# Patient Record
Sex: FEMALE | Race: BLACK OR AFRICAN AMERICAN | Employment: OTHER | URBAN - METROPOLITAN AREA
[De-identification: names, ages, dates, MRNs, and addresses within clinical notes are randomized per-mention and may not be internally consistent; named-entity substitution may affect disease eponyms.]

---

## 2017-06-26 ENCOUNTER — APPOINTMENT (OUTPATIENT)
Dept: CT IMAGING | Age: 82
End: 2017-06-26
Attending: EMERGENCY MEDICINE
Payer: MEDICARE

## 2017-06-26 ENCOUNTER — HOSPITAL ENCOUNTER (EMERGENCY)
Age: 82
Discharge: HOME | End: 2017-06-26
Attending: EMERGENCY MEDICINE
Payer: MEDICARE

## 2017-06-26 VITALS
HEIGHT: 65 IN | OXYGEN SATURATION: 99 % | WEIGHT: 154 LBS | RESPIRATION RATE: 18 BRPM | SYSTOLIC BLOOD PRESSURE: 165 MMHG | DIASTOLIC BLOOD PRESSURE: 70 MMHG | TEMPERATURE: 97.5 F | BODY MASS INDEX: 25.66 KG/M2 | HEART RATE: 75 BPM

## 2017-06-26 DIAGNOSIS — R29.6 RECURRENT FALLS: Primary | ICD-10-CM

## 2017-06-26 PROCEDURE — 70450 CT HEAD/BRAIN W/O DYE: CPT

## 2017-06-26 PROCEDURE — 93005 ELECTROCARDIOGRAM TRACING: CPT

## 2017-06-26 PROCEDURE — 99284 EMERGENCY DEPT VISIT MOD MDM: CPT

## 2017-06-26 NOTE — ED TRIAGE NOTES
Reports intermittent \"dizziness\" that she describes as her \"head swimming\" x2 years. Has discussed with her PCP and told she has anemia but does not take anything for it. Also reporting a headache since Thursday that she did NOT take anything for PTA.

## 2017-06-26 NOTE — ED NOTES
While doing EKG, patient reports \"last weekend I was sitting with my friends and I knew what I wanted to say but my speech was garbled. I couldn't get the words out. \" Dr. Sloan Fears aware when EKG was given to provider. Patient states symptoms lasted approximately 20 minutes.

## 2017-06-26 NOTE — DISCHARGE INSTRUCTIONS
Preventing Falls: Care Instructions  Your Care Instructions  Getting around your home safely can be a challenge if you have injuries or health problems that make it easy for you to fall. Loose rugs and furniture in walkways are among the dangers for many older people who have problems walking or who have poor eyesight. People who have conditions such as arthritis, osteoporosis, or dementia also have to be careful not to fall. You can make your home safer with a few simple measures. Follow-up care is a key part of your treatment and safety. Be sure to make and go to all appointments, and call your doctor if you are having problems. It's also a good idea to know your test results and keep a list of the medicines you take. How can you care for yourself at home? Taking care of yourself  · You may get dizzy if you do not drink enough water. To prevent dehydration, drink plenty of fluids, enough so that your urine is light yellow or clear like water. Choose water and other caffeine-free clear liquids. If you have kidney, heart, or liver disease and have to limit fluids, talk with your doctor before you increase the amount of fluids you drink. · Exercise regularly to improve your strength, muscle tone, and balance. Walk if you can. Swimming may be a good choice if you cannot walk easily. · Have your vision and hearing checked each year or any time you notice a change. If you have trouble seeing and hearing, you might not be able to avoid objects and could lose your balance. · Know the side effects of the medicines you take. Ask your doctor or pharmacist whether the medicines you take can affect your balance. Sleeping pills or sedatives can affect your balance. · Limit the amount of alcohol you drink. Alcohol can impair your balance and other senses. · Ask your doctor whether calluses or corns on your feet need to be removed.  If you wear loose-fitting shoes because of calluses or corns, you can lose your balance and fall. · Talk to your doctor if you have numbness in your feet. Preventing falls at home  · Remove raised doorway thresholds, throw rugs, and clutter. Repair loose carpet or raised areas in the floor. · Move furniture and electrical cords to keep them out of walking paths. · Use nonskid floor wax, and wipe up spills right away, especially on ceramic tile floors. · If you use a walker or cane, put rubber tips on it. If you use crutches, clean the bottoms of them regularly with an abrasive pad, such as steel wool. · Keep your house well lit, especially [place], and outside walkways. Use night-lights in areas such as hallways and bathrooms. Add extra light switches or use remote switches (such as switches that go on or off when you clap your hands) to make it easier to turn lights on if you have to get up during the night. · Install sturdy handrails on stairways. · Move items in your cabinets so that the things you use a lot are on the lower shelves (about waist level). · Keep a cordless phone and a flashlight with new batteries by your bed. If possible, put a phone in each of the main rooms of your house, or carry a cell phone in case you fall and cannot reach a phone. Or, you can wear a device around your neck or wrist. You push a button that sends a signal for help. · Wear low-heeled shoes that fit well and give your feet good support. Use footwear with nonskid soles. Check the heels and soles of your shoes for wear. Repair or replace worn heels or soles. · Do not wear socks without shoes on wood floors. · Walk on the grass when the sidewalks are slippery. If you live in an area that gets snow and ice in the winter, sprinkle salt on slippery steps and sidewalks. Preventing falls in the bath  · Install grab bars and nonskid mats inside and outside your shower or tub and near the toilet and sinks. · Use shower chairs and bath benches.   · Use a hand-held shower head that will allow you to sit while showering. · Get into a tub or shower by putting the weaker leg in first. Get out of a tub or shower with your strong side first.  · Repair loose toilet seats and consider installing a raised toilet seat to make getting on and off the toilet easier. · Keep your bathroom door unlocked while you are in the shower. Where can you learn more? Go to http://nelly-mellissa.info/. Enter [id number] [id number] in the search box to learn more about \"Preventing Falls: Care Instructions. \"  Current as of: August 4, 2016  Content Version: 11.3  © 3844-8252 [employer]. Care instructions adapted under license by [employer] (which disclaims liability or warranty for this information). If you have questions about a medical condition or this instruction, always ask your healthcare professional. [place] [address] any warranty or liability for your use of this information. We hope that we have addressed all of your medical concerns. The examination and treatment you received in the Emergency Department were for an emergent problem and were not intended as complete care. It is important that you follow up with your healthcare provider(s) for ongoing care. If your symptoms worsen or do not improve as expected, and you are unable to reach your usual health care provider(s), you should return to the Emergency Department. Today's healthcare is undergoing tremendous change, and patient satisfaction surveys are one of the many tools to assess the quality of medical care. You may receive a survey from the [employer] organization regarding your experience in the Emergency Department. I hope that your experience has been completely positive, particularly the medical care that I provided. As such, please participate in the survey; anything less than excellent does not meet my expectations or intentions.         [facility] [employer] participate in nationally recognized quality of care measures. If your blood pressure is greater than 120/80, as reported below, we urge that you seek medical care to address the potential of high blood pressure, commonly known as hypertension. Hypertension can be hereditary or can be caused by certain medical conditions, pain, stress, or \"white coat syndrome. \"       Please make an appointment with your health care provider(s) for follow up of your Emergency Department visit. VITALS:   Patient Vitals for the past 8 hrs:   Temp Pulse Resp BP SpO2   06/26/17 1800 - - - - 97 %   06/26/17 1745 - - - - 100 %   06/26/17 1715 - - - 159/56 99 %   06/26/17 1700 - - - 199/70 -   06/26/17 1404 97.5 °F (36.4 °C) 75 18 (!) 207/92 100 %          Thank you for allowing us to provide you with medical care today. We realize that you have many choices for your emergency care needs. Please choose us in the future for any continued health care needs. Bunny Berry, [address] [place] [address] Avenue: [phone]            No results found for this or any previous visit (from the past 24 hour(s)). Ct Head Wo Cont    Result Date: 6/26/2017  EXAM:  CT HEAD WO CONT Clinical history: Dizziness and falls INDICATION:   dizziness, and falls COMPARISON: None. TECHNIQUE: Unenhanced CT of the head was performed using 5 mm images. Brain and bone windows were generated. CT dose reduction was achieved through use of a standardized protocol tailored for this examination and automatic exposure control for dose modulation. FINDINGS: Sulcal and ventricular prominence. . Scattered hypodensities. . There is no intracranial hemorrhage, extra-axial collection, mass, mass effect or midline shift. The basilar cisterns are open. No acute infarct is identified. The bone windows demonstrate no abnormalities. The visualized portions of the paranasal sinuses and mastoid air cells are clear. IMPRESSION: No acute intracranial process.

## 2017-06-26 NOTE — ED PROVIDER NOTES
HPI Comments: 80 y.o. female with past medical history significant for DM and CKD who presents to the ED with chief complaint of dizziness. Pt reports intermittent dizziness for the past couple years accompanied by a headache onset today that \"feels like her head is going to pop off. \" Pt states on 2/6/17 she had an episode where she found herself on the floor and was unsure how she got there and was unable to get up. Pt states she was admitted for about 2-3 weeks at that time and had a negative work up, says she was discharged to a rehab facility but has since gone home. Pt states she had another similar episode after returning home when she stood up and \"went down to the floor,\" says her home health nurse took her BP at that time and told her she was hypotensive. Pt states she has hx of DM and takes glipizide. There are no other acute medical complaints voiced at this time. PCP: Moy Michelle MD    Note written by Chandni Betancourt, as dictated by Lb Marley MD 2:44 PM     The history is provided by the patient. No past medical history on file. No past surgical history on file. No family history on file. Social History     Social History    Marital status: SINGLE     Spouse name: N/A    Number of children: N/A    Years of education: N/A     Occupational History    Not on file. Social History Main Topics    Smoking status: Not on file    Smokeless tobacco: Not on file    Alcohol use Not on file    Drug use: Not on file    Sexual activity: Not on file     Other Topics Concern    Not on file     Social History Narrative    No narrative on file         ALLERGIES: Review of patient's allergies indicates no known allergies. Review of Systems   Constitutional: Negative for chills and fever. HENT: Negative for ear pain and sore throat. Eyes: Negative for photophobia and pain. Respiratory: Negative for chest tightness and shortness of breath. Cardiovascular: Negative for chest pain and leg swelling. Gastrointestinal: Negative for abdominal pain, nausea and vomiting. Genitourinary: Negative for dysuria and flank pain. Musculoskeletal: Negative for back pain and neck pain. Skin: Negative for rash and wound. Neurological: Positive for dizziness and headaches. Negative for light-headedness. All other systems reviewed and are negative. Vitals:    06/26/17 1404   BP: (!) 207/92   Pulse: 75   Resp: 18   Temp: 97.5 °F (36.4 °C)   SpO2: 100%   Weight: 69.9 kg (154 lb)   Height: 5' 5\" (1.651 m)            Physical Exam   Constitutional: She is oriented to person, place, and time. She appears well-developed and well-nourished. No distress. HENT:   Head: Normocephalic and atraumatic. Mouth/Throat: Oropharynx is clear and moist.   Eyes: Conjunctivae and EOM are normal. Pupils are equal, round, and reactive to light. Neck: Normal range of motion. Cardiovascular: Normal rate, regular rhythm, normal heart sounds and intact distal pulses. No murmur heard. Pulmonary/Chest: Effort normal and breath sounds normal. No stridor. No respiratory distress. She has no wheezes. Abdominal: Soft. Bowel sounds are normal. There is no tenderness. Musculoskeletal: Normal range of motion. She exhibits no edema, tenderness or deformity. Neurological: She is alert and oriented to person, place, and time. No cranial nerve deficit. Skin: Skin is warm and dry. She is not diaphoretic. Psychiatric: She has a normal mood and affect. Nursing note and vitals reviewed. MDM  Number of Diagnoses or Management Options  Recurrent falls:   Diagnosis management comments: Patient with headache and reports that she occasionally ends up on floor for no reason. Denies passing out, just feels like her legs quit. Check head CT for headache and HTN  Patient not orthostatic and safe for d/c home to f/u with her doctor.        Amount and/or Complexity of Data Reviewed  Tests in the radiology section of CPT®: ordered and reviewed    Patient Progress  Patient progress: improved    ED Course       Procedures    ED EKG interpretation:  Rhythm: normal sinus rhythm; and regular . Rate (approx.): 76; Axis: normal; ST/T wave: Non STEMI; Nonspecific ST and T wave abnormality. Note written by Chandni Tierney, as dictated by Tiny Burgos MD 2:15 PM    PROGRESS NOTE:  5:14 PM   Pt is not orthostatic. Still awaiting head CT. No results found for this or any previous visit (from the past 24 hour(s)). Ct Head Wo Cont    Result Date: 6/26/2017  EXAM:  CT HEAD WO CONT Clinical history: Dizziness and falls INDICATION:   dizziness, and falls COMPARISON: None. TECHNIQUE: Unenhanced CT of the head was performed using 5 mm images. Brain and bone windows were generated. CT dose reduction was achieved through use of a standardized protocol tailored for this examination and automatic exposure control for dose modulation. FINDINGS: Sulcal and ventricular prominence. . Scattered hypodensities. . There is no intracranial hemorrhage, extra-axial collection, mass, mass effect or midline shift. The basilar cisterns are open. No acute infarct is identified. The bone windows demonstrate no abnormalities. The visualized portions of the paranasal sinuses and mastoid air cells are clear. IMPRESSION: No acute intracranial process.

## 2017-06-26 NOTE — ED NOTES
Patient was stuck 5 times by 2 nurses including with the ultrasound and no blood was obtained and no IV access was established. MD aware.

## 2017-06-27 LAB
ATRIAL RATE: 76 BPM
CALCULATED P AXIS, ECG09: 42 DEGREES
CALCULATED R AXIS, ECG10: 29 DEGREES
CALCULATED T AXIS, ECG11: 47 DEGREES
DIAGNOSIS, 93000: NORMAL
P-R INTERVAL, ECG05: 162 MS
Q-T INTERVAL, ECG07: 394 MS
QRS DURATION, ECG06: 70 MS
QTC CALCULATION (BEZET), ECG08: 443 MS
VENTRICULAR RATE, ECG03: 76 BPM

## 2017-08-08 ENCOUNTER — HOSPITAL ENCOUNTER (EMERGENCY)
Age: 82
Discharge: HOME | End: 2017-08-08
Attending: EMERGENCY MEDICINE
Payer: MEDICARE

## 2017-08-08 ENCOUNTER — APPOINTMENT (OUTPATIENT)
Dept: CT IMAGING | Age: 82
End: 2017-08-08
Attending: EMERGENCY MEDICINE
Payer: MEDICARE

## 2017-08-08 VITALS
RESPIRATION RATE: 18 BRPM | HEIGHT: 65 IN | TEMPERATURE: 98.5 F | SYSTOLIC BLOOD PRESSURE: 149 MMHG | HEART RATE: 73 BPM | WEIGHT: 145 LBS | BODY MASS INDEX: 24.16 KG/M2 | DIASTOLIC BLOOD PRESSURE: 67 MMHG | OXYGEN SATURATION: 98 %

## 2017-08-08 DIAGNOSIS — R51.9 NONINTRACTABLE EPISODIC HEADACHE, UNSPECIFIED HEADACHE TYPE: Primary | ICD-10-CM

## 2017-08-08 LAB
ANION GAP BLD CALC-SCNC: 18 MMOL/L (ref 5–15)
BUN BLD-MCNC: 15 MG/DL (ref 9–20)
CA-I BLD-MCNC: 1.03 MMOL/L (ref 1.12–1.32)
CHLORIDE BLD-SCNC: 109 MMOL/L (ref 98–107)
CO2 BLD-SCNC: 21 MMOL/L (ref 21–32)
CREAT BLD-MCNC: 1.4 MG/DL (ref 0.6–1.3)
GLUCOSE BLD-MCNC: 88 MG/DL (ref 65–100)
HCT VFR BLD CALC: 30 % (ref 35–47)
HGB BLD-MCNC: 10.2 GM/DL (ref 11.5–16)
POTASSIUM BLD-SCNC: 3.7 MMOL/L (ref 3.5–5.1)
SERVICE CMNT-IMP: ABNORMAL
SODIUM BLD-SCNC: 143 MMOL/L (ref 136–145)

## 2017-08-08 PROCEDURE — 99283 EMERGENCY DEPT VISIT LOW MDM: CPT

## 2017-08-08 PROCEDURE — 80047 BASIC METABLC PNL IONIZED CA: CPT

## 2017-08-08 PROCEDURE — 70450 CT HEAD/BRAIN W/O DYE: CPT

## 2017-08-08 RX ORDER — BUTALBITAL, ACETAMINOPHEN AND CAFFEINE 300; 40; 50 MG/1; MG/1; MG/1
1 CAPSULE ORAL
Qty: 20 CAP | Refills: 0 | Status: SHIPPED | OUTPATIENT
Start: 2017-08-08 | End: 2019-09-30

## 2017-08-08 NOTE — ED PROVIDER NOTES
HPI Comments: 80 y.o. female with past medical history significant for breast cancer, DM, high cholesterol, and hypothyroid who presents to the ED with chief complaint of headache. Pt reports she had a fall in February and was admitted for 6 days. Pt reports she has had intermittent headaches since her fall, says she was dx with anemia and started on iron and her headaches improved but she has continued to have headaches intermittently. Pt reports her current right sided headache began after getting home from [employer] 2 days ago. Pt states she has had intermittent hand numbness since hitting it while walking down the aisle at [employer], says today she poured orange juice onto the counter rather than into her cup due to her hand numbness. Pt states she has also had balance issues, says she is \"staggering like a drunk. \" Pt denies syncope, dizziness, chest pain, palpitations, SOB, abdominal pain, blood in stool, or urinary sx. There are no other acute medical complaints voiced at this time. PCP: Sejal Verdugo MD    Note written by Chandni Salmeron, as dictated by Bobbi Hunter MD 10:23 AM     The history is provided by the patient. Past Medical History:   Diagnosis Date    Cancer [facility])     left breast    [facility] ([facility] [address]     Ill-defined condition     high cholesterol, hypothyroid       Past Surgical History:   Procedure Laterality Date    BREAST SURGERY PROCEDURE UNLISTED      left breast lumpectomy    HX APPENDECTOMY      HX GYN      hysterectomy    HX ORTHOPAEDIC      back         No family history on file. Social History     Social History    Marital status: SINGLE     Spouse name: N/A    Number of children: N/A    Years of education: N/A     Occupational History    Not on file.      Social History Main Topics    Smoking status: Never Smoker    Smokeless tobacco: Not on file    Alcohol use No    Drug use: Not on file    Sexual activity: Not on file     Other Topics Concern    Not on file     Social History Narrative         ALLERGIES: Review of patient's allergies indicates no known allergies. Review of Systems   Constitutional: Negative. Negative for appetite change, fever and unexpected weight change. HENT: Negative for ear pain, hearing loss, nosebleeds, rhinorrhea, sore throat and trouble swallowing. Respiratory: Negative. Negative for cough, chest tightness and shortness of breath. Cardiovascular: Negative. Negative for chest pain and palpitations. Gastrointestinal: Negative. Negative for abdominal distention, abdominal pain, blood in stool and vomiting. Endocrine: Negative. Genitourinary: Negative for decreased urine volume, difficulty urinating, dysuria, frequency, hematuria and urgency. Musculoskeletal: Positive for gait problem (balance issues). Negative for back pain and myalgias. Skin: Negative. Negative for rash. Allergic/Immunologic: Negative. Neurological: Positive for numbness (hand) and headaches. Negative for dizziness, syncope and weakness. Hematological: Negative. Psychiatric/Behavioral: Negative. All other systems reviewed and are negative. Vitals:    08/08/17 0903   BP: 149/67   Pulse: 73   Resp: 18   Temp: 98.5 °F (36.9 °C)   SpO2: 98%   Weight: 65.8 kg (145 lb)   Height: 5' 5\" (1.651 m)            Physical Exam   Constitutional: She is oriented to person, place, and time. She appears well-developed and well-nourished. No distress. HENT:   Head: Normocephalic and atraumatic. Right Ear: External ear normal.   Left Ear: External ear normal.   Nose: Nose normal.   Mouth/Throat: Oropharynx is clear and moist.   Eyes: Conjunctivae and EOM are normal. Pupils are equal, round, and reactive to light. Neck: Normal range of motion. Neck supple. No JVD present. No thyromegaly present. Cardiovascular: Normal rate, regular rhythm, normal heart sounds and intact distal pulses. No murmur heard.   Pulmonary/Chest: Effort normal and breath sounds normal. No respiratory distress. She has no wheezes. She has no rales. Abdominal: Soft. Bowel sounds are normal. She exhibits no distension. There is no tenderness. Musculoskeletal: Normal range of motion. She exhibits no edema. Neurological: She is alert and oriented to person, place, and time. No cranial nerve deficit. Skin: Skin is warm and dry. No rash noted. Psychiatric: She has a normal mood and affect. Her behavior is normal. Thought content normal.   Nursing note and vitals reviewed. Note written by Chandni Calvin, as dictated by Beba Gutierrez MD 10:24 AM    [facility]  ED Course       Procedures     PROGRESS NOTE:  10:25 AM   Pt had a head CT today that was normal.     PROGRESS NOTE:  10:57 AM   Chem 8 unremarkable. Hemoglobin 10.2. A/P: Nonspecific headache. Will discharge home with prescription for fioricet and close PCP f/u.

## 2017-08-08 NOTE — ED TRIAGE NOTES
Dizziness since yesterday and having anterior headache behind right eye. Pt with steady gait. Denies weakness, speech is clear.

## 2017-08-08 NOTE — DISCHARGE INSTRUCTIONS
Headache: Care Instructions  Your Care Instructions    Headaches have many possible causes. Most headaches aren't a sign of a more serious problem, and they will get better on their own. Home treatment may help you feel better faster. The doctor has checked you carefully, but problems can develop later. If you notice any problems or new symptoms, get medical treatment right away. Follow-up care is a key part of your treatment and safety. Be sure to make and go to all appointments, and call your doctor if you are having problems. It's also a good idea to know your test results and keep a list of the medicines you take. How can you care for yourself at home? · Do not drive if you have taken a prescription pain medicine. · Rest in a quiet, dark room until your headache is gone. Close your eyes and try to relax or go to sleep. Don't watch TV or read. · Put a cold, moist cloth or cold pack on the painful area for 10 to 20 minutes at a time. Put a thin cloth between the cold pack and your skin. · Use a warm, moist towel or a heating pad set on low to relax tight shoulder and neck muscles. · Have someone gently massage your neck and shoulders. · Take pain medicines exactly as directed. ¨ If the doctor gave you a prescription medicine for pain, take it as prescribed. ¨ If you are not taking a prescription pain medicine, ask your doctor if you can take an over-the-counter medicine. · Be careful not to take pain medicine more often than the instructions allow, because you may get worse or more frequent headaches when the medicine wears off. · Do not ignore new symptoms that occur with a headache, such as a fever, weakness or numbness, vision changes, or confusion. These may be signs of a more serious problem. To prevent headaches  · Keep a headache diary so you can figure out what triggers your headaches. Avoiding triggers may help you prevent headaches.  Record when each headache began, how long it lasted, and what the pain was like (throbbing, aching, stabbing, or dull). Write down any other symptoms you had with the headache, such as nausea, flashing lights or dark spots, or sensitivity to bright light or loud noise. Note if the headache occurred near your period. List anything that might have triggered the headache, such as certain foods (chocolate, cheese, wine) or odors, smoke, bright light, stress, or lack of sleep. · Find healthy ways to deal with stress. Headaches are most common during or right after stressful times. Take time to relax before and after you do something that has caused a headache in the past.  · Try to keep your muscles relaxed by keeping good posture. Check your jaw, face, neck, and shoulder muscles for tension, and try relaxing them. When sitting at a desk, change positions often, and stretch for 30 seconds each hour. · Get plenty of sleep and exercise. · Eat regularly and well. Long periods without food can trigger a headache. · Treat yourself to a massage. Some people find that regular massages are very helpful in relieving tension. · Limit caffeine by not drinking too much coffee, tea, or soda. But don't quit caffeine suddenly, because that can also give you headaches. · Reduce eyestrain from computers by blinking frequently and looking away from the computer screen every so often. Make sure you have proper eyewear and that your monitor is set up properly, about an arm's length away. · Seek help if you have depression or anxiety. Your headaches may be linked to these conditions. Treatment can both prevent headaches and help with symptoms of anxiety or depression. When should you call for help? Call 911 anytime you think you may need emergency care. For example, call if:  · You have signs of a stroke. These may include:  ¨ Sudden numbness, paralysis, or weakness in your face, arm, or leg, especially on only one side of your body. ¨ Sudden vision changes.   ¨ Sudden trouble speaking. ¨ Sudden confusion or trouble understanding simple statements. ¨ Sudden problems with walking or balance. ¨ A sudden, severe headache that is different from past headaches. Call your doctor now or seek immediate medical care if:  · You have a new or worse headache. · Your headache gets much worse. Where can you learn more? Go to http://nelly-mellissa.info/. Enter M271 in the search box to learn more about \"Headache: Care Instructions. \"  Current as of: October 14, 2016  Content Version: 11.3  © 4037-1958 [employer]. Care instructions adapted under license by [employer] (which disclaims liability or warranty for this information). If you have questions about a medical condition or this instruction, always ask your healthcare professional. [address] any warranty or liability for your use of this information. We hope that we have addressed all of your medical concerns. The examination and treatment you received in the Emergency Department were for an emergent problem and were not intended as complete care. It is important that you follow up with your healthcare provider(s) for ongoing care. If your symptoms worsen or do not improve as expected, and you are unable to reach your usual health care provider(s), you should return to the Emergency Department. Today's healthcare is undergoing tremendous change, and patient satisfaction surveys are one of the many tools to assess the quality of medical care. You may receive a survey from the [employer] regarding your experience in the Emergency Department. I hope that your experience has been completely positive, particularly the medical care that I provided. As such, please participate in the survey; anything less than excellent does not meet my expectations or intentions.         [address] and [address] [place] [address] participate in nationally recognized quality of care measures. If your blood pressure is greater than 120/80, as reported below, we urge that you seek medical care to address the potential of high blood pressure, commonly known as hypertension. Hypertension can be hereditary or can be caused by certain medical conditions, pain, stress, or \"white coat syndrome. \"       Please make an appointment with your health care provider(s) for follow up of your Emergency Department visit. VITALS:   Patient Vitals for the past 8 hrs:   Temp Pulse Resp BP SpO2   08/08/17 0903 98.5 °F (36.9 °C) 73 18 149/67 98 %          Thank you for allowing us to provide you with medical care today. We realize that you have many choices for your emergency care needs. Please choose us in the future for any continued health care needs. Regards,           Jered Knapp Cap, [address] [place] [address].   Office: [phone]            Recent Results (from the past 24 hour(s))   POC CHEM8    Collection Time: 08/08/17 10:39 AM   Result Value Ref Range    Calcium, ionized (POC) 1.03 (L) 1.12 - 1.32 MMOL/L    Sodium (POC) 143 136 - 145 MMOL/L    Potassium (POC) 3.7 3.5 - 5.1 MMOL/L    Chloride (POC) 109 (H) 98 - 107 MMOL/L    CO2 (POC) 21 21 - 32 MMOL/L    Anion gap (POC) 18 (H) 5 - 15 mmol/L    Glucose (POC) 88 65 - 100 MG/DL    BUN (POC) 15 9 - 20 MG/DL    Creatinine (POC) 1.4 (H) 0.6 - 1.3 MG/DL    GFRAA, POC 43 (L) >60 ml/min/1.73m2    GFRNA, POC 35 (L) >60 ml/min/1.73m2    Hemoglobin (POC) 10.2 (L) 11.5 - 16.0 GM/DL    Hematocrit (POC) 30 (L) 35.0 - 47.0 %    Comment Comment Not Indicated. Ct Head Wo Cont    Result Date: 8/8/2017  INDICATION: headache, dizziness, hx of recurrent falls Exam: Noncontrast CT of the brain is performed with 5 mm collimation. CT dose reduction was achieved with the use of the standardized protocol tailored for this examination and automatic exposure control for dose modulation.  Direct comparison is made to prior CT dated June 2017. FINDINGS: There is mild, age-appropriate diffuse cortical atrophy. There is no acute intracranial hemorrhage, mass, mass effect or herniation. Ventricular system is normal. The gray-white matter differentiation is well-preserved. The mastoid air cells are well pneumatized. The visualized paranasal sinuses are normal.     IMPRESSION: No acute intracranial hemorrhage, mass or infarct.

## 2018-09-06 DIAGNOSIS — Z79.899 PHARMACOLOGIC THERAPY: ICD-10-CM

## 2018-09-06 DIAGNOSIS — E11.8 TYPE 2 DIABETES MELLITUS WITH COMPLICATION, UNSPECIFIED WHETHER LONG TERM INSULIN USE: Primary | ICD-10-CM

## 2018-09-06 NOTE — PROGRESS NOTES
Patient walked in to have labs reordered.  Verbal was given per Dr. Joe Morales per previous order  that is no longer accepted at [facility].

## 2018-09-21 LAB
BASOPHILS # BLD AUTO: 0 X10E3/UL (ref 0–0.2)
BASOPHILS NFR BLD AUTO: 0 %
BUN SERPL-MCNC: 15 MG/DL (ref 10–36)
BUN/CREAT SERPL: 10 (ref 12–28)
CALCIUM SERPL-MCNC: 9.1 MG/DL (ref 8.7–10.3)
CHLORIDE SERPL-SCNC: 107 MMOL/L (ref 96–106)
CO2 SERPL-SCNC: 21 MMOL/L (ref 20–29)
CREAT SERPL-MCNC: 1.48 MG/DL (ref 0.57–1)
EOSINOPHIL # BLD AUTO: 0.3 X10E3/UL (ref 0–0.4)
EOSINOPHIL NFR BLD AUTO: 4 %
ERYTHROCYTE [DISTWIDTH] IN BLOOD BY AUTOMATED COUNT: 14.6 % (ref 12.3–15.4)
GLUCOSE SERPL-MCNC: 103 MG/DL (ref 65–99)
HBA1C MFR BLD: 6 % (ref 4.8–5.6)
HCT VFR BLD AUTO: 29.9 % (ref 34–46.6)
HGB BLD-MCNC: 9.4 G/DL (ref 11.1–15.9)
IMM GRANULOCYTES # BLD: 0 X10E3/UL (ref 0–0.1)
IMM GRANULOCYTES NFR BLD: 0 %
LYMPHOCYTES # BLD AUTO: 2.4 X10E3/UL (ref 0.7–3.1)
LYMPHOCYTES NFR BLD AUTO: 33 %
MCH RBC QN AUTO: 26.4 PG (ref 26.6–33)
MCHC RBC AUTO-ENTMCNC: 31.4 G/DL (ref 31.5–35.7)
MCV RBC AUTO: 84 FL (ref 79–97)
MONOCYTES # BLD AUTO: 0.6 X10E3/UL (ref 0.1–0.9)
MONOCYTES NFR BLD AUTO: 8 %
NEUTROPHILS # BLD AUTO: 3.9 X10E3/UL (ref 1.4–7)
NEUTROPHILS NFR BLD AUTO: 55 %
PLATELET # BLD AUTO: 238 X10E3/UL (ref 150–379)
POTASSIUM SERPL-SCNC: 4.2 MMOL/L (ref 3.5–5.2)
RBC # BLD AUTO: 3.56 X10E6/UL (ref 3.77–5.28)
SODIUM SERPL-SCNC: 146 MMOL/L (ref 134–144)
WBC # BLD AUTO: 7.1 X10E3/UL (ref 3.4–10.8)

## 2018-09-28 PROBLEM — N18.2 CKD (CHRONIC KIDNEY DISEASE), STAGE II: Status: ACTIVE | Noted: 2018-09-28

## 2018-09-28 PROBLEM — E78.00 HYPERCHOLESTEROLEMIA: Status: ACTIVE | Noted: 2018-09-28

## 2018-09-28 PROBLEM — M85.80 OSTEOPENIA: Status: ACTIVE | Noted: 2018-09-28

## 2018-09-28 PROBLEM — L20.9 ATOPIC DERMATITIS: Status: ACTIVE | Noted: 2018-09-28

## 2018-09-28 PROBLEM — E03.9 ACQUIRED HYPOTHYROIDISM: Status: ACTIVE | Noted: 2018-09-28

## 2018-09-28 PROBLEM — E11.9 TYPE II DIABETES MELLITUS (HCC): Status: ACTIVE | Noted: 2018-09-28

## 2018-09-28 PROBLEM — R41.3 MEMORY LOSS: Status: ACTIVE | Noted: 2018-09-28

## 2018-09-28 PROBLEM — I10 ESSENTIAL HYPERTENSION: Status: ACTIVE | Noted: 2018-09-28

## 2018-09-28 PROBLEM — D64.9 ANEMIA: Status: ACTIVE | Noted: 2018-09-28

## 2018-09-28 PROBLEM — I89.0 LYMPHEDEMA: Status: ACTIVE | Noted: 2018-09-28

## 2018-09-28 PROBLEM — L84 CALLUS: Status: ACTIVE | Noted: 2018-09-28

## 2018-09-28 PROBLEM — Z85.3 HISTORY OF BREAST CANCER: Status: ACTIVE | Noted: 2018-09-28

## 2018-09-28 PROBLEM — E11.49 DIABETIC NEUROPATHY WITH NEUROLOGIC COMPLICATION (HCC): Status: ACTIVE | Noted: 2018-09-28

## 2018-09-28 PROBLEM — E11.40 DIABETIC NEUROPATHY WITH NEUROLOGIC COMPLICATION (HCC): Status: ACTIVE | Noted: 2018-09-28

## 2018-09-28 PROBLEM — F03.90 DEMENTIA (HCC): Status: ACTIVE | Noted: 2018-09-28

## 2018-09-28 RX ORDER — SIMVASTATIN 40 MG/1
TABLET, FILM COATED ORAL
COMMUNITY
End: 2018-12-05 | Stop reason: SDUPTHER

## 2018-09-28 RX ORDER — GUAIFENESIN 100 MG/5ML
81 LIQUID (ML) ORAL DAILY
COMMUNITY
End: 2019-05-30 | Stop reason: SDUPTHER

## 2018-10-01 VITALS — HEIGHT: 65 IN

## 2018-10-01 RX ORDER — FLUDROCORTISONE ACETATE 0.1 MG/1
TABLET ORAL
COMMUNITY
Start: 2018-09-06 | End: 2019-01-05 | Stop reason: SDUPTHER

## 2018-10-01 RX ORDER — LEVOTHYROXINE SODIUM 50 UG/1
TABLET ORAL
COMMUNITY
Start: 2018-07-11 | End: 2018-12-05 | Stop reason: SDUPTHER

## 2018-10-01 NOTE — PROGRESS NOTES
Information entered today, has been abstracted from [facility]'s previous [address], EMDs. 6/21/18 progress notes will be scanned into the medical record of Tracy Mariscal in [address] [facility].

## 2018-10-02 ENCOUNTER — OFFICE VISIT (OUTPATIENT)
Dept: FAMILY MEDICINE CLINIC | Age: 83
End: 2018-10-02

## 2018-10-02 VITALS
SYSTOLIC BLOOD PRESSURE: 97 MMHG | WEIGHT: 143 LBS | HEIGHT: 65 IN | TEMPERATURE: 98.1 F | DIASTOLIC BLOOD PRESSURE: 53 MMHG | RESPIRATION RATE: 16 BRPM | OXYGEN SATURATION: 99 % | BODY MASS INDEX: 23.82 KG/M2 | HEART RATE: 70 BPM

## 2018-10-02 DIAGNOSIS — I95.1 ORTHOSTATIC HYPOTENSION: ICD-10-CM

## 2018-10-02 DIAGNOSIS — E78.00 HYPERCHOLESTEROLEMIA: ICD-10-CM

## 2018-10-02 DIAGNOSIS — D50.0 IRON DEFICIENCY ANEMIA DUE TO CHRONIC BLOOD LOSS: ICD-10-CM

## 2018-10-02 DIAGNOSIS — E03.9 ACQUIRED HYPOTHYROIDISM: Primary | ICD-10-CM

## 2018-10-02 PROBLEM — E11.21 TYPE 2 DIABETES WITH NEPHROPATHY (HCC): Status: ACTIVE | Noted: 2018-10-02

## 2018-10-02 RX ORDER — FERROUS SULFATE 220 (44)/5
220 SOLUTION, ORAL ORAL 2 TIMES DAILY
Qty: 1 BOTTLE | Refills: 5 | Status: SHIPPED | OUTPATIENT
Start: 2018-10-02 | End: 2019-05-30 | Stop reason: SDUPTHER

## 2018-10-02 NOTE — PATIENT INSTRUCTIONS
Start taking iron again. A liquid form of iron has been sent to your pharmacy. See me in 3 months. Be careful to take fall prevention steps

## 2018-10-02 NOTE — PROGRESS NOTES
Zenaida Wilson is a 80 y.o. female Chief Complaint Patient presents with  Follow-up  
  medication and recent bloodwork 1. Have you been to the ER, urgent care clinic since your last visit? Hospitalized since your last visit?  no 
 
2. Have you seen or consulted any other health care providers outside of the [address] [place] [address] since your last visit? Include any pap smears or colon screening.    no

## 2018-10-02 NOTE — PROGRESS NOTES
Lalo Deleon is a 80 y.o. female presenting for Follow-up (medication and recent bloodwork) Gera Inch Hypothyroidism Taking meds every day Doing well with that Orthostatic hypotension Not dizzy as much Recent HGB 9.4, known colonic polyp untreated, Not taking iron which is too big of a pill Diabetes mellitus No longer taking medication Recent A1c fine on diet only Dementia Dx given in past, No meds Still driving and handling her own affairs Review of Systems Constitutional: Negative for weight loss. Respiratory: Negative for shortness of breath. Cardiovascular: Negative for chest pain. Gastrointestinal: Negative for abdominal pain and blood in stool. Genitourinary: Negative for hematuria. Neurological: Negative for dizziness, seizures and loss of consciousness. Past Medical History:  
Diagnosis Date  Breast cancer ([facility]  Cataract  Chronic renal failure  Colonic polyp  Dementia  Eczema  Headache  Hyperlipemia  Hypothyroidism  Osteopenia  Posterior vitreous detachment, left  Type 2 diabetes mellitus ([facility]  UTI (urinary tract infection)  Vitamin D deficiency Past Surgical History:  
Procedure Laterality Date  HX CATARACT REMOVAL    
 HX COLONOSCOPY  2009 [address] S/P Left breast ,edial segmental mastectomy- 1992;  
 
Family History Problem Relation Age of Onset  No Known Problems Mother  No Known Problems Father Social History Social History  Marital status: UNKNOWN Spouse name: N/A  
 Number of children: N/A  
 Years of education: N/A Occupational History  Not on file. Social History Main Topics  Smoking status: Never Smoker  Smokeless tobacco: Never Used  Alcohol use No  
 Drug use: No  
 Sexual activity: No  
 
Other Topics Concern  Not on file Social History Narrative Current Outpatient Prescriptions Medication Sig Dispense Refill  ferrous sulfate 220 mg (44 mg iron)/5 mL solution Take 5 mL by mouth two (2) times a day. 1 Bottle 5  
 levothyroxine (SYNTHROID) 50 mcg tablet  fludrocortisone (FLORINEF) 0.1 mg tablet  levothyroxine sodium (SYNTHROID PO) Take  by mouth.  simvastatin (ZOCOR) 40 mg tablet Take  by mouth nightly.  aspirin 81 mg chewable tablet Take 81 mg by mouth daily.  cholecalciferol, vitamin D3, (VITAMIN D3 PO) Take  by mouth.  glucose blood VI test strips (BLOOD GLUCOSE TEST) strip by Does Not Apply route See Admin Instructions. Not on File Vitals:  
 10/02/18 1413 BP: 97/53 Pulse: 70 Resp: 16 Temp: 98.1 °F (36.7 °C) TempSrc: Oral  
SpO2: 99% Weight: 143 lb (64.9 kg) Height: 5' 4.5\" (1.638 m) Body mass index is 24.17 kg/(m^2). Objective General: Patient alert and oriented and in NAD HEENT: PER/EOMI, no conjunctival pallor or scleral icterus. No thyromegaly or cervical lymphadenopathy Heart: Regular rate and rhythm, No murmurs, rubs or gallops. Lungs: Clear to auscultation bilaterally, no wheezing, rales or rhonchi Ext: No edema Skin: No rashes or lesions noted on exposed skin Neuro: AAOx3 Psych: Appropriate mood and affect HGB dropped from over 11 to 9.4 but not taking iron as instructed. Known colonic polyp Assessment and Plan: 1. Acquired hypothyroidism Remains on thyroid hormone 2. Hypercholesterolemia Continue simvastatin 3. Orthostatic hypotension Continue fludricortisone Anemia may contribute to what remaining dizziness she has 4. Iron deficiency anemia due to chronic blood loss Patient cannot take oral iron pills. She denies blood loss She will watch for blood in BM's and urine 
- ferrous sulfate 220 mg (44 mg iron)/5 mL solution; Take 5 mL by mouth two (2) times a day. Dispense: 1 Bottle; Refill: 5\ 
Recheck CBC next visit as well as TSH See us sooner if blood in stool 5.  Dementia Seems to be thinking clearly Draws clock well today. Advised to minimize driving. Diagnosis and plan discussed with patient who verbillized understanding. Follow-up Disposition: 
Return in about 3 months (around 1/2/2019) for Blood pressure follow up.  
 
[facility]

## 2018-10-02 NOTE — MR AVS SNAPSHOT
[address] [place] [address] [address] 
[phone] Patient: Samanta Holder MRN: [id number] Visit Information Date & Time Provider Department Dept. Phone Encounter #  
 10/2/2018  2:15 PM Ruchi Del Rosario MD [facility] 144 [phone] [id number] Follow-up Instructions Return in about 3 months (around 2019) for Blood pressure follow up. Upcoming Health Maintenance Date Due  
 LIPID PANEL Q1 1925 FOOT EXAM Q1 1935 MICROALBUMIN Q1 1935 EYE EXAM RETINAL OR DILATED Q1 1935 DTaP/Tdap/Td series (1 - Tdap) 1946 Shingrix Vaccine Age 50> (1 of 2) 1975 GLAUCOMA SCREENING Q2Y 1990 Bone Densitometry (Dexa) Screening 1990 Pneumococcal 65+ Low/Medium Risk (2 of 2 - PCV13) 2010 Influenza Age 5 to Adult 2018 MEDICARE YEARLY EXAM 2018 HEMOGLOBIN A1C Q6M 3/20/2019 Allergies as of 10/2/2018  Review Complete On: 10/2/2018 By: Ruchi Del Rosario MD  
 Not on File Current Immunizations  Never Reviewed Name Date Pneumococcal Polysaccharide (PPSV-23) 2009 Not reviewed this visit You Were Diagnosed With   
  
 Codes Comments Acquired hypothyroidism    -  Primary ICD-10-CM: E03.9 ICD-9-CM: 244.9 Hypercholesterolemia     ICD-10-CM: E78.00 ICD-9-CM: 272.0 Orthostatic hypotension     ICD-10-CM: I95.1 ICD-9-CM: 458.0 Iron deficiency anemia due to chronic blood loss     ICD-10-CM: D50.0 ICD-9-CM: 280.0 Vitals BP Pulse Temp Resp Height(growth percentile) Weight(growth percentile) 97/53 (BP 1 Location: Right arm, BP Patient Position: Sitting) 70 98.1 °F (36.7 °C) (Oral) 16 5' 4.5\" (1.638 m) 143 lb (64.9 kg) LMP SpO2 BMI Smoking Status (LMP Unknown) 99% 24.17 kg/m2 Never Smoker Vitals History BMI and BSA Data Body Mass Index Body Surface Area 24.17 kg/m 2 1.72 m 2 Preferred Pharmacy Pharmacy Name Phone [address] [facility] [phone] Your Updated Medication List  
  
   
This list is accurate as of 10/2/18  3:20 PM.  Always use your most recent med list.  
  
  
  
  
 aspirin 81 mg chewable tablet Take 81 mg by mouth daily. blood glucose test strip Generic drug:  glucose blood VI test strips  
by Does Not Apply route See Admin Instructions. ferrous sulfate 220 mg (44 mg iron)/5 mL solution Take 5 mL by mouth two (2) times a day. fludrocortisone 0.1 mg tablet Commonly known as:  FLORINEF  
  
 * SYNTHROID PO Take  by mouth. * levothyroxine 50 mcg tablet Commonly known as:  SYNTHROID  
  
 VITAMIN D3 PO Take  by mouth. ZOCOR 40 mg tablet Generic drug:  simvastatin Take  by mouth nightly. * Notice: This list has 2 medication(s) that are the same as other medications prescribed for you. Read the directions carefully, and ask your doctor or other care provider to review them with you. Prescriptions Sent to Pharmacy Refills  
 ferrous sulfate 220 mg (44 mg iron)/5 mL solution 5 Sig: Take 5 mL by mouth two (2) times a day. Class: Normal  
 Pharmacy: [address] [facility] [address] [facility] #: [phone] Route: Oral  
  
Follow-up Instructions Return in about 3 months (around 1/2/2019) for Blood pressure follow up. Patient Instructions Start taking iron again. A liquid form of iron has been sent to your pharmacy. See me in 3 months. Be careful to take fall prevention steps Introducing [facility]! [facility] introduces [employer] patient portal. Now you can access parts of your medical record, email your doctor's office, and request medication refills online. 1. In your internet browser, go to [email or web address] 2. Click on the First Time User? Click Here link in the Sign In box. You will see the New Member Sign Up page. 3. Enter your Connect2me Access Code exactly as it appears below. You will not need to use this code after youve completed the sign-up process. If you do not sign up before the expiration date, you must request a new code. · [employer] Access Code: [email or web address] Expires: 12/31/2018  1:41 PM 
 
4. Enter the last four digits of your Social Security Number (xxxx) and Date of Birth (mm/dd/yyyy) as indicated and click Submit. You will be taken to the next sign-up page. 5. Create a Connect2me ID. This will be your Connect[employer] login ID and cannot be changed, so think of one that is secure and easy to remember. 6. Create a [employer] password. You can change your password at any time. 7. Enter your Password Reset Question and Answer. This can be used at a later time if you forget your password. 8. Enter your e-mail address. You will receive e-mail notification when new information is available in [address] 9. Click Sign Up. You can now view and download portions of your medical record. 10. Click the Download Summary menu link to download a portable copy of your medical information. If you have questions, please visit the Frequently Asked Questions section of the Connect[employer] website. Remember, [employer] is NOT to be used for urgent needs. For medical emergencies, dial 911. Now available from your iPhone and Android! Please provide this summary of care documentation to your next provider. Your primary care clinician is listed as [facility]. If you have any questions after today's visit, please call [phone].

## 2018-12-06 RX ORDER — LEVOTHYROXINE SODIUM 50 UG/1
TABLET ORAL
Qty: 90 TAB | Refills: 1 | Status: SHIPPED | OUTPATIENT
Start: 2018-12-06 | End: 2019-05-30 | Stop reason: SDUPTHER

## 2018-12-06 RX ORDER — SIMVASTATIN 40 MG/1
TABLET, FILM COATED ORAL
Qty: 90 TAB | Refills: 1 | Status: SHIPPED | OUTPATIENT
Start: 2018-12-06 | End: 2019-05-30 | Stop reason: SDUPTHER

## 2018-12-28 ENCOUNTER — TELEPHONE (OUTPATIENT)
Dept: FAMILY MEDICINE CLINIC | Age: 83
End: 2018-12-28

## 2019-01-02 ENCOUNTER — TELEPHONE (OUTPATIENT)
Dept: FAMILY MEDICINE CLINIC | Age: 84
End: 2019-01-02

## 2019-01-02 DIAGNOSIS — E11.9 CONTROLLED TYPE 2 DIABETES MELLITUS WITHOUT COMPLICATION, WITHOUT LONG-TERM CURRENT USE OF INSULIN (HCC): Primary | ICD-10-CM

## 2019-01-02 DIAGNOSIS — D50.0 IRON DEFICIENCY ANEMIA DUE TO CHRONIC BLOOD LOSS: ICD-10-CM

## 2019-01-02 NOTE — TELEPHONE ENCOUNTER
01/02/19  5:38 PM    1. Controlled type 2 diabetes mellitus without complication, without long-term current use of insulin ([facility])    - METABOLIC PANEL, BASIC  - HEMOGLOBIN A1C WITH EAG    2.  Iron deficiency anemia due to chronic blood loss  - CBC WITH AUTOMATED DIFF      Ramiro Durham MD\

## 2019-01-02 NOTE — TELEPHONE ENCOUNTER
Patient showed up to [facility] today to get her labs. Please put order in ASAP as they did draw blood since patient did not have a ride to come at another time.

## 2019-01-02 NOTE — TELEPHONE ENCOUNTER
Sherri from the lab was calling checking on status of patient's lab order as patient was in this morning and message had been forwarded asking order to be placed as she didn't have transport to come back and they did not get a response. Lab states they have taken the vials of patient's blood as she did not have transport to come back but they just need order so they know what to do with them. Are we able to place this lab order for patient?

## 2019-01-04 LAB
BASOPHILS # BLD AUTO: 0 X10E3/UL (ref 0–0.2)
BASOPHILS NFR BLD AUTO: 0 %
BUN SERPL-MCNC: 13 MG/DL (ref 10–36)
BUN/CREAT SERPL: 8 (ref 12–28)
CALCIUM SERPL-MCNC: 9.1 MG/DL (ref 8.7–10.3)
CHLORIDE SERPL-SCNC: 104 MMOL/L (ref 96–106)
CO2 SERPL-SCNC: 25 MMOL/L (ref 20–29)
CREAT SERPL-MCNC: 1.69 MG/DL (ref 0.57–1)
EOSINOPHIL # BLD AUTO: 0.2 X10E3/UL (ref 0–0.4)
EOSINOPHIL NFR BLD AUTO: 3 %
ERYTHROCYTE [DISTWIDTH] IN BLOOD BY AUTOMATED COUNT: 14.2 % (ref 12.3–15.4)
EST. AVERAGE GLUCOSE BLD GHB EST-MCNC: 120 MG/DL
GLUCOSE SERPL-MCNC: 112 MG/DL (ref 65–99)
HBA1C MFR BLD: 5.8 % (ref 4.8–5.6)
HCT VFR BLD AUTO: 34.3 % (ref 34–46.6)
HGB BLD-MCNC: 10.9 G/DL (ref 11.1–15.9)
IMM GRANULOCYTES # BLD: 0 X10E3/UL (ref 0–0.1)
IMM GRANULOCYTES NFR BLD: 0 %
LYMPHOCYTES # BLD AUTO: 2 X10E3/UL (ref 0.7–3.1)
LYMPHOCYTES NFR BLD AUTO: 29 %
MCH RBC QN AUTO: 26.5 PG (ref 26.6–33)
MCHC RBC AUTO-ENTMCNC: 31.8 G/DL (ref 31.5–35.7)
MCV RBC AUTO: 83 FL (ref 79–97)
MONOCYTES # BLD AUTO: 0.5 X10E3/UL (ref 0.1–0.9)
MONOCYTES NFR BLD AUTO: 7 %
NEUTROPHILS # BLD AUTO: 4.2 X10E3/UL (ref 1.4–7)
NEUTROPHILS NFR BLD AUTO: 61 %
PLATELET # BLD AUTO: 278 X10E3/UL (ref 150–379)
POTASSIUM SERPL-SCNC: 3.4 MMOL/L (ref 3.5–5.2)
RBC # BLD AUTO: 4.12 X10E6/UL (ref 3.77–5.28)
SODIUM SERPL-SCNC: 146 MMOL/L (ref 134–144)
WBC # BLD AUTO: 7 X10E3/UL (ref 3.4–10.8)

## 2019-01-07 RX ORDER — FLUDROCORTISONE ACETATE 0.1 MG/1
TABLET ORAL
Qty: 180 TAB | Refills: 1 | Status: SHIPPED | OUTPATIENT
Start: 2019-01-07 | End: 2019-05-30 | Stop reason: ALTCHOICE

## 2019-01-09 ENCOUNTER — TELEPHONE (OUTPATIENT)
Dept: FAMILY MEDICINE CLINIC | Age: 84
End: 2019-01-09

## 2019-01-09 ENCOUNTER — OFFICE VISIT (OUTPATIENT)
Dept: FAMILY MEDICINE CLINIC | Age: 84
End: 2019-01-09

## 2019-01-09 VITALS
SYSTOLIC BLOOD PRESSURE: 172 MMHG | OXYGEN SATURATION: 100 % | DIASTOLIC BLOOD PRESSURE: 65 MMHG | BODY MASS INDEX: 23.16 KG/M2 | HEART RATE: 67 BPM | TEMPERATURE: 97.9 F | WEIGHT: 139 LBS | HEIGHT: 65 IN | RESPIRATION RATE: 18 BRPM

## 2019-01-09 DIAGNOSIS — I10 ESSENTIAL HYPERTENSION: ICD-10-CM

## 2019-01-09 DIAGNOSIS — E11.9 TYPE 2 DIABETES MELLITUS WITHOUT COMPLICATION, WITHOUT LONG-TERM CURRENT USE OF INSULIN (HCC): Primary | ICD-10-CM

## 2019-01-09 DIAGNOSIS — I95.1 ORTHOSTATIC HYPOTENSION: ICD-10-CM

## 2019-01-09 NOTE — TELEPHONE ENCOUNTER
After receiving fax that patients fludrocortisone 0.1mg has been back ordered. I contacted the pharmacy. They are giving the patient everything they have which will last 25 days, they are due to get a replenishment of this medication mid-late July. I advised if the replenishment does not come in time that they need to contact other pharmacies to locate more of this medication for the patient. I advised an alternative is not an option for this patient due to her age and the length of time she has been on this medication. The pharmacist stated understanding and stated she would make a note of that in the chart if it becomes a problem but she is hopeful that the shipment will arrive as promised by the .

## 2019-01-09 NOTE — PROGRESS NOTES
Adilson Celeste is a 80 y.o. female presenting for Hypertension (follow up) 
and to FU orthostatic hypotension and DM on diet only. History of Present Illness: 
Diabetes type: DM2 on diet only Medications: diet only, remains fine off of metformin Lab Results Component Value Date/Time WBC 7.0 01/03/2019 09:09 AM  
 Hemoglobin (POC) 10.2 (L) 08/08/2017 10:39 AM  
 HGB 10.9 (L) 01/03/2019 09:09 AM  
 Hematocrit (POC) 30 (L) 08/08/2017 10:39 AM  
 HCT 34.3 01/03/2019 09:09 AM  
 PLATELET 641 47/31/4289 09:09 AM  
 MCV 83 01/03/2019 09:09 AM  
 
Lab Results Component Value Date/Time Hemoglobin A1c 5.8 (H) 01/03/2019 09:09 AM  
 
Lab Results Component Value Date/Time Glucose 112 (H) 01/03/2019 09:09 AM  
 Glucose (POC) 88 08/08/2017 10:39 AM  
 
 
 
HTN The patient presents today for HTN follow-up. Notes BP has been up on visits to other offices (renal) who chose not to treat her. No syncope. Remains on Florinef for orthostatic sx. Lab Results Component Value Date/Time Sodium 146 (H) 01/03/2019 09:09 AM  
 Potassium 3.4 (L) 01/03/2019 09:09 AM  
 Chloride 104 01/03/2019 09:09 AM  
 CO2 25 01/03/2019 09:09 AM  
 Glucose 112 (H) 01/03/2019 09:09 AM  
 BUN 13 01/03/2019 09:09 AM  
 Creatinine 1.69 (H) 01/03/2019 09:09 AM  
 BUN/Creatinine ratio 8 (L) 01/03/2019 09:09 AM  
 GFR est AA 30 (L) 01/03/2019 09:09 AM  
 GFR est non-AA 26 (L) 01/03/2019 09:09 AM  
 Calcium 9.1 01/03/2019 09:09 AM  
Review of Systems Constitutional: Weight loss: 4 pounds since last visit. Respiratory: Negative for shortness of breath. Cardiovascular: Negative for chest pain. Gastrointestinal: Negative for blood in stool. Genitourinary: Negative for hematuria. Musculoskeletal: Negative for falls. Neurological: Negative for loss of consciousness. Past Medical History:  
Diagnosis Date  Breast cancer ([facility]  Cancer [facility])   
 left breast  
 Cataract  Chronic renal failure  Colonic polyp  Dementia  Diabetes ([facility]  Eczema  Headache  Hyperlipemia  Hypothyroidism  Ill-defined condition   
 high cholesterol, hypothyroid  Osteopenia  Posterior vitreous detachment, left  Type 2 diabetes mellitus ([facility]  UTI (urinary tract infection)  Vitamin D deficiency Past Surgical History:  
Procedure Laterality Date  BREAST SURGERY PROCEDURE UNLISTED    
 left breast lumpectomy  HX APPENDECTOMY  HX CATARACT REMOVAL    
 HX COLONOSCOPY  2009  HX GYN    
 hysterectomy [id number] Left breast ,edial segmental mastectomy- 1992;  
 HX ORTHOPAEDIC    
 back Family History Problem Relation Age of Onset  No Known Problems Mother  No Known Problems Father Social History Socioeconomic History  Marital status: UNKNOWN Spouse name: Not on file  Number of children: Not on file  Years of education: Not on file  Highest education level: Not on file Social Needs  Financial resource strain: Not on file  Food insecurity - worry: Not on file  Food insecurity - inability: Not on file  Transportation needs - medical: Not on file  Transportation needs - non-medical: Not on file Occupational History  Not on file Tobacco Use  Smoking status: Never Smoker  Smokeless tobacco: Never Used Substance and Sexual Activity  Alcohol use: No  
 Drug use: No  
 Sexual activity: No  
Other Topics Concern  Not on file Social History Narrative ** Merged History Encounter **  
    
 
 
 
Current Outpatient Medications Medication Sig Dispense Refill  fludrocortisone (FLORINEF) 0.1 mg tablet TAKE 2 TABLETS BY MOUTH ONCE DAILY 180 Tab 1  
 levothyroxine (SYNTHROID) 50 mcg tablet TAKE 1 TABLET BY MOUTH ONCE DAILY 90 Tab 1  
 simvastatin (ZOCOR) 40 mg tablet TAKE 1 TABLET BY MOUTH ONCE DAILY AT BEDTIME 90 Tab 1  
  ferrous sulfate 220 mg (44 mg iron)/5 mL solution Take 5 mL by mouth two (2) times a day. 1 Bottle 5  
 aspirin 81 mg chewable tablet Take 81 mg by mouth daily.  cholecalciferol, vitamin D3, (VITAMIN D3 PO) Take  by mouth.  glucose blood VI test strips (BLOOD GLUCOSE TEST) strip by Does Not Apply route See Admin Instructions.  butalbital-acetaminophen-caff (FIORICET) -40 mg per capsule Take 1 Cap by mouth every six (6) hours as needed for Pain. Max Daily Amount: 4 Caps. 20 Cap 0 No Known Allergies Vitals:  
 01/09/19 1617 BP: 172/65 Pulse: 67 Resp: 18 Temp: 97.9 °F (36.6 °C) TempSrc: Oral  
SpO2: 100% Weight: 139 lb (63 kg) Height: 5' 4.5\" (1.638 m) Body mass index is 23.49 kg/m². Objective General: Patient alert and oriented and in NAD, pleasant and well dressed. Complains that family is too attentive to her. HEENT: PER/EOMI, no conjunctival pallor or scleral icterus. No thyromegaly or cervical lymphadenopathy Heart: Regular rate and rhythm, No murmurs, rubs or gallops. Lungs: Clear to auscultation bilaterally, no wheezing, rales or rhonchi Ext: No edema Skin: No rashes or lesions noted on exposed skin Neuro: AAOx3, Fluent speech, normal gait with support Psych: Appropriate mood and affect Assessment and Plan: 1. Type 2 diabetes mellitus without complication, without long-term current use of insulin ([address] Doing well off of DM meds. Stick to diet only. With weight loss, I am not emphasizing diet much 2. Essential hypertension BP is up but treatment would risk falls from orthostatic sx. Also has renal decline so will maintain on current sx. 3. Orthostatic hypotension Pharmacy has had some trouble getting her Florinef but we have called them and they are reasonably confident that they can find supply with other pharmacies and suppliers. Diagnosis and plan discussed with patient who verbillized understanding. Follow-up Disposition: 
Return in about 3 months (around 4/9/2019) for Blood pressure follow up, Diabetes follow up.  
 
[facility]

## 2019-01-09 NOTE — PROGRESS NOTES
1. Have you been to the ER, urgent care clinic since your last visit? Hospitalized since your last visit?no No 
 
 
2. Have you seen or consulted any other health care providers outside of the [address] [place] [address] since your last visit? Include any pap smears or colon screening.  No

## 2019-02-18 ENCOUNTER — TELEPHONE (OUTPATIENT)
Dept: FAMILY MEDICINE CLINIC | Age: 84
End: 2019-02-18

## 2019-02-18 NOTE — TELEPHONE ENCOUNTER
Patient called and left message last week requesting to speak with Dr. Lizz Mendez nurse. No other details left. Called patient back no answer. Will attempt to call patient back again.

## 2019-02-20 NOTE — TELEPHONE ENCOUNTER
Spoke with Dr. Elina Brown and explained to patient That medication is to keep her blood pressure up so she doesn't get dizzy or pass out. She should keep taking it. Not sure why [facility] is only giving her 25 at a time. I have sent in prescription for 180. She may need to get her prescription elsewhere if they can't find her a full amount for the medication per Dr. Elina Brown. Patient verbalized an understanding.

## 2019-02-20 NOTE — TELEPHONE ENCOUNTER
That medication is to keep her blood pressure up so she doesn't get dizzy or pass out. She should keep taking it. Not sure why [facility] is only giving her 25 at a time. I have sent in prescription for 180. She may need to get her prescription elsewhere if they can't find her a full amount for the medication.   RH

## 2019-02-20 NOTE — TELEPHONE ENCOUNTER
Patient inquiring why she is taking Fludrocortisone, and wants to know the make up of the medication because she spoke with someone who states that animals take this medication and [facility] is only dispensing 25 tabs at a time. She also states that [employer] provided no information on medication. Please advise.

## 2019-04-17 ENCOUNTER — OFFICE VISIT (OUTPATIENT)
Dept: FAMILY MEDICINE CLINIC | Age: 84
End: 2019-04-17

## 2019-04-17 VITALS
HEART RATE: 63 BPM | HEIGHT: 65 IN | WEIGHT: 136 LBS | DIASTOLIC BLOOD PRESSURE: 69 MMHG | SYSTOLIC BLOOD PRESSURE: 158 MMHG | TEMPERATURE: 97.8 F | RESPIRATION RATE: 16 BRPM | OXYGEN SATURATION: 96 % | BODY MASS INDEX: 22.66 KG/M2

## 2019-04-17 DIAGNOSIS — I95.1 ORTHOSTATIC HYPOTENSION: ICD-10-CM

## 2019-04-17 DIAGNOSIS — I10 ESSENTIAL HYPERTENSION: Primary | ICD-10-CM

## 2019-04-17 DIAGNOSIS — Z00.00 MEDICARE ANNUAL WELLNESS VISIT, SUBSEQUENT: ICD-10-CM

## 2019-04-17 NOTE — PROGRESS NOTES
Assessment and Plan 1. Essential hypertension No rx since also has orthostatic sx - METABOLIC PANEL, BASIC 2. Medicare annual wellness visit, subsequent Shots updated, visit completed. Fall prevention discussed 
- varicella-zoster recombinant, PF, (SHINGRIX, PF,) 50 mcg/0.5 mL susr injection; 0.5mL by IntraMUSCular route once now and then repeat in 2-6 months  Dispense: 0.5 mL; Refill: 1 
- diph,pertuss,acel,,tetanus vac,PF, (ADACEL) 2 Lf-(2.5-5-3-5 mcg)-5Lf/0.5 mL syrg vaccine; 0.5 mL by IntraMUSCular route once for 1 dose. Dispense: 0.5 mL; Refill: 0 
- pneumococcal 13 rajani conj dip (PREVNAR 13, PF,) 0.5 mL syrg injection; 0.5 mL by IntraMUSCular route once for 1 dose. Dispense: 0.5 mL; Refill: 0 
 
3. Orthostatic hypotension Cont Fluorinef Follow-up and Dispositions · Return in about 3 months (around 2019) for Blood pressure follow up, Medication follow up. Diagnosis and plan discussed with patient who verbillized understanding. History of present Lamont Elkton is a 80 y.o. female presenting for Labs and Annual Wellness Visit Feels she isn't walking well Stumbles and goes to left No history of stroke. Taking meds consistently BP has been up in past. 
Has orthostatic hypotension Reports appetite is good. Review of Systems Respiratory: Negative. Cardiovascular: Negative. Gastrointestinal: Negative. Genitourinary: Negative. Neurological: Negative. Psychiatric/Behavioral: Positive for memory loss. Past Medical History:  
Diagnosis Date  Breast cancer ([facility]  Cancer [facility])   
 left breast  
 Cataract  Chronic renal failure  Colonic polyp  Dementia  Diabetes ([facility]  Eczema  Headache  Hyperlipemia  Hypothyroidism  Ill-defined condition   
 high cholesterol, hypothyroid  Osteopenia  Posterior vitreous detachment, left  Type 2 diabetes mellitus ([facility]  UTI (urinary tract infection)  Vitamin D deficiency Past Surgical History:  
Procedure Laterality Date  BREAST SURGERY PROCEDURE UNLISTED    
 left breast lumpectomy  HX APPENDECTOMY  HX CATARACT REMOVAL    
 HX COLONOSCOPY  2009  HX GYN    
 hysterectomy [id number] Left breast ,edial segmental mastectomy- 1992;  
 HX ORTHOPAEDIC    
 back Family History Problem Relation Age of Onset  No Known Problems Mother  No Known Problems Father Social History Socioeconomic History  Marital status: UNKNOWN Spouse name: Not on file  Number of children: Not on file  Years of education: Not on file  Highest education level: Not on file Occupational History  Not on file Social Needs  Financial resource strain: Not on file  Food insecurity:  
  Worry: Not on file Inability: Not on file  Transportation needs:  
  Medical: Not on file Non-medical: Not on file Tobacco Use  Smoking status: Never Smoker  Smokeless tobacco: Never Used Substance and Sexual Activity  Alcohol use: No  
 Drug use: No  
 Sexual activity: Never Lifestyle  Physical activity:  
  Days per week: Not on file Minutes per session: Not on file  Stress: Not on file Relationships  Social connections:  
  Talks on phone: Not on file Gets together: Not on file Attends Yarsanism service: Not on file Active member of club or organization: Not on file Attends meetings of clubs or organizations: Not on file Relationship status: Not on file  Intimate partner violence:  
  Fear of current or ex partner: Not on file Emotionally abused: Not on file Physically abused: Not on file Forced sexual activity: Not on file Other Topics Concern  Not on file Social History Narrative ** Merged History Encounter **  
    
 
 
 
Current Outpatient Medications Medication Sig Dispense Refill  varicella-zoster recombinant, PF, (SHINGRIX, PF,) 50 mcg/0.5 mL susr injection 0.5mL by IntraMUSCular route once now and then repeat in 2-6 months 0.5 mL 1  diph,pertuss,acel,,tetanus vac,PF, (ADACEL) 2 Lf-(2.5-5-3-5 mcg)-5Lf/0.5 mL syrg vaccine 0.5 mL by IntraMUSCular route once for 1 dose. 0.5 mL 0  
 pneumococcal 13 rajani conj dip (PREVNAR 13, PF,) 0.5 mL syrg injection 0.5 mL by IntraMUSCular route once for 1 dose. 0.5 mL 0  
 fludrocortisone (FLORINEF) 0.1 mg tablet TAKE 2 TABLETS BY MOUTH ONCE DAILY 180 Tab 1  
 levothyroxine (SYNTHROID) 50 mcg tablet TAKE 1 TABLET BY MOUTH ONCE DAILY 90 Tab 1  
 simvastatin (ZOCOR) 40 mg tablet TAKE 1 TABLET BY MOUTH ONCE DAILY AT BEDTIME 90 Tab 1  
 ferrous sulfate 220 mg (44 mg iron)/5 mL solution Take 5 mL by mouth two (2) times a day. 1 Bottle 5  
 aspirin 81 mg chewable tablet Take 81 mg by mouth daily.  cholecalciferol, vitamin D3, (VITAMIN D3 PO) Take  by mouth.  glucose blood VI test strips (BLOOD GLUCOSE TEST) strip by Does Not Apply route See Admin Instructions.  butalbital-acetaminophen-caff (FIORICET) -40 mg per capsule Take 1 Cap by mouth every six (6) hours as needed for Pain. Max Daily Amount: 4 Caps. 20 Cap 0 No Known Allergies Vitals:  
 04/17/19 1357 04/17/19 1433 BP: 184/69 158/69 Pulse: 63 Resp: 16 Temp: 97.8 °F (36.6 °C) TempSrc: Oral   
SpO2: 96% Weight: 136 lb (61.7 kg) Height: 5' 4.5\" (1.638 m) Body mass index is 22.98 kg/m². Objective General: Patient alert and oriented and in NAD Neck: No thyromegaly or cervical lymphadenopathy Cardiovascular: Heart has regular rate and rhythm, No murmurs, rubs or gallops. No edema Respiratory: Lungs are clear to auscultation bilaterally, no wheezing, rales or rhonchi, normal chest excursion and no increased work of breathing. deferred Musculoskeletal: All four extremities present and functional.  
 Skin: No rashes or lesions noted on exposed skin Neuro: AAOx3, normal gait and speech. No gross neurologic deficits. Psych: Appropriate mood and affect, no homicidal or suicidal ideation, no obsessions, delusions or hallucinations, normal psychomotor status. This is the Subsequent Medicare Annual Wellness Exam, performed 12 months or more after the Initial AWV or the last Subsequent AWV I have reviewed the patient's medical history in detail and updated the computerized patient record. History Past Medical History:  
Diagnosis Date  Breast cancer ([facility]  Cancer [facility])   
 left breast  
 Cataract  Chronic renal failure  Colonic polyp  Dementia  Diabetes ([facility]  Eczema  Headache  Hyperlipemia  Hypothyroidism  Ill-defined condition   
 high cholesterol, hypothyroid  Osteopenia  Posterior vitreous detachment, left  Type 2 diabetes mellitus ([facility]  UTI (urinary tract infection)  Vitamin D deficiency Past Surgical History:  
Procedure Laterality Date  BREAST SURGERY PROCEDURE UNLISTED    
 left breast lumpectomy  HX APPENDECTOMY  HX CATARACT REMOVAL    
 HX COLONOSCOPY  2009  HX GYN    
 hysterectomy [address] [facility] [facility] Left breast ,edial segmental mastectomy- 1992;  
 HX ORTHOPAEDIC    
 back Current Outpatient Medications Medication Sig Dispense Refill  varicella-zoster recombinant, PF, (SHINGRIX, PF,) 50 mcg/0.5 mL susr injection 0.5mL by IntraMUSCular route once now and then repeat in 2-6 months 0.5 mL 1  diph,pertuss,acel,,tetanus vac,PF, (ADACEL) 2 Lf-(2.5-5-3-5 mcg)-5Lf/0.5 mL syrg vaccine 0.5 mL by IntraMUSCular route once for 1 dose. 0.5 mL 0  
 pneumococcal 13 rajani conj dip (PREVNAR 13, PF,) 0.5 mL syrg injection 0.5 mL by IntraMUSCular route once for 1 dose.  0.5 mL 0  
 fludrocortisone (FLORINEF) 0.1 mg tablet TAKE 2 TABLETS BY MOUTH ONCE DAILY 180 Tab 1  
  levothyroxine (SYNTHROID) 50 mcg tablet TAKE 1 TABLET BY MOUTH ONCE DAILY 90 Tab 1  
 simvastatin (ZOCOR) 40 mg tablet TAKE 1 TABLET BY MOUTH ONCE DAILY AT BEDTIME 90 Tab 1  
 ferrous sulfate 220 mg (44 mg iron)/5 mL solution Take 5 mL by mouth two (2) times a day. 1 Bottle 5  
 aspirin 81 mg chewable tablet Take 81 mg by mouth daily.  cholecalciferol, vitamin D3, (VITAMIN D3 PO) Take  by mouth.  glucose blood VI test strips (BLOOD GLUCOSE TEST) strip by Does Not Apply route See Admin Instructions.  butalbital-acetaminophen-caff (FIORICET) -40 mg per capsule Take 1 Cap by mouth every six (6) hours as needed for Pain. Max Daily Amount: 4 Caps. 20 Cap 0 No Known Allergies Family History Problem Relation Age of Onset  No Known Problems Mother  No Known Problems Father Social History Tobacco Use  Smoking status: Never Smoker  Smokeless tobacco: Never Used Substance Use Topics  Alcohol use: No  
 
Patient Active Problem List  
Diagnosis Code  Acquired hypothyroidism E03.9  Anemia D64.9  Atopic dermatitis L20.9  Callus L84  
 CKD (chronic kidney disease), stage II N18.2  Dementia F03.90  Type II diabetes mellitus ([facility] E11.9  Diabetic neuropathy with neurologic complication (HCC) V04.04, E11.49  
 Essential hypertension I10  
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  
 Orthostatic hypotension I95.1 Depression Risk Factor Screening:  
 
3 most recent PHQ Screens 1/9/2019 Little interest or pleasure in doing things Not at all Feeling down, depressed, irritable, or hopeless Not at all Total Score PHQ 2 0 Alcohol Risk Factor Screening: You do not drink alcohol or very rarely. Functional Ability and Level of Safety:  
Hearing Loss The patient needs further evaluation. Activities of Daily Living The home contains: walker Patient does total self care Fall Risk Fall Risk Assessment, last 12 mths 4/17/2019 Able to walk? Yes Fall in past 12 months? -  
Fall with injury? -  
Number of falls in past 12 months - Fall Risk Score -  
 
 
Abuse Screen Patient is not abused Cognitive Screening Evaluation of Cognitive Function: 
Has your family/caregiver stated any concerns about your memory: no 
Normal 
 
Patient Care Team  
Patient Care Team: 
Michelle Hill MD as PCP - General (Family Practice) Isael Camilo MD (Nephrology) Isael Camilo MD (Nephrology) Marian Terrell MD (Surgery) Assessment/Plan Education and counseling provided: 
Are appropriate based on today's review and evaluation Pneumococcal Vaccine Screening for glaucoma Diagnoses and all orders for this visit: 1. Essential hypertension -     METABOLIC PANEL, BASIC 2. Medicare annual wellness visit, subsequent 
-     varicella-zoster recombinant, PF, (SHINGRIX, PF,) 50 mcg/0.5 mL susr injection; 0.5mL by IntraMUSCular route once now and then repeat in 2-6 months 
-     diph,pertuss,acel,,tetanus vac,PF, (ADACEL) 2 Lf-(2.5-5-3-5 mcg)-5Lf/0.5 mL syrg vaccine; 0.5 mL by IntraMUSCular route once for 1 dose. -     pneumococcal 13 rajani conj dip (PREVNAR 13, PF,) 0.5 mL syrg injection; 0.5 mL by IntraMUSCular route once for 1 dose. 3. Orthostatic hypotension Health Maintenance Due Topic Date Due  
 LIPID PANEL Q1  11/09/1925  
 FOOT EXAM Q1  11/09/1935  
 EYE EXAM RETINAL OR DILATED  11/09/1935  
 DTaP/Tdap/Td series (1 - Tdap) 11/09/1946  Shingrix Vaccine Age 50> (1 of 2) 11/09/1975  Bone Densitometry (Dexa) Screening  11/09/1990  Pneumococcal 65+ years (2 of 2 - PCV13) 09/18/2010  MEDICARE YEARLY EXAM  09/06/2018

## 2019-04-17 NOTE — PROGRESS NOTES
Brenton Soler is a 80 y.o. female Chief Complaint Patient presents with  Labs [facility] Annual Wellness Visit 1. Have you been to the ER, urgent care clinic since your last visit? Hospitalized since your last visit? No 
 
2. Have you seen or consulted any other health care providers outside of the [address] [place] [address] since your last visit? Include any pap smears or colon screening. Seen an Dr Santos Cordova Opthamologist 2 weeks ago for pre op exam. 
 
Visit Vitals /69 (BP 1 Location: Left arm, BP Patient Position: Sitting) Pulse 63 Temp 97.8 °F (36.6 °C) (Oral) Resp 16 Ht 5' 4.5\" (1.638 m) Wt 136 lb (61.7 kg) SpO2 96% BMI 22.98 kg/m²

## 2019-04-17 NOTE — PATIENT INSTRUCTIONS
Medicare Wellness Visit, Female The best way to live healthy is to have a lifestyle where you eat a well-balanced diet, exercise regularly, limit alcohol use, and quit all forms of tobacco/nicotine, if applicable. Regular preventive services are another way to keep healthy. Preventive services (vaccines, screening tests, monitoring & exams) can help personalize your care plan, which helps you manage your own care. Screening tests can find health problems at the earliest stages, when they are easiest to treat. Sean Garza follows the current, evidence-based guidelines published by the [facility] Renny Clare ([facility]) when recommending preventive services for our patients. Because we follow these guidelines, sometimes recommendations change over time as research supports it. (For example, mammograms used to be recommended annually. Even though Medicare will still pay for an annual mammogram, the newer guidelines recommend a mammogram every two years for women of average risk.) Of course, you and your doctor may decide to screen more often for some diseases, based on your risk and your health status. Preventive services for you include: - Medicare offers their members a free annual wellness visit, which is time for you and your primary care provider to discuss and plan for your preventive service needs. Take advantage of this benefit every year! 
-All adults over the age of 72 should receive the recommended pneumonia vaccines. Current USPSTF guidelines recommend a series of two vaccines for the best pneumonia protection.  
-All adults should have a flu vaccine yearly and a tetanus vaccine every 10 years. All adults age 61 and older should receive a shingles vaccine once in their lifetime.   
-A bone mass density test is recommended when a woman turns 65 to screen for osteoporosis. This test is only recommended one time, as a screening. Some providers will use this same test as a disease monitoring tool if you already have osteoporosis. -All adults age 38-68 who are overweight should have a diabetes screening test once every three years.  
-Other screening tests and preventive services for persons with diabetes include: an eye exam to screen for diabetic retinopathy, a kidney function test, a foot exam, and stricter control over your cholesterol.  
-Cardiovascular screening for adults with routine risk involves an electrocardiogram (ECG) at intervals determined by your doctor.  
-Colorectal cancer screenings should be done for adults age 54-65 with no increased risk factors for colorectal cancer. There are a number of acceptable methods of screening for this type of cancer. Each test has its own benefits and drawbacks. Discuss with your doctor what is most appropriate for you during your annual wellness visit. The different tests include: colonoscopy (considered the best screening method), a fecal occult blood test, a fecal DNA test, and sigmoidoscopy. -Breast cancer screenings are recommended every other year for women of normal risk, age 54-69. 
-Cervical cancer screenings for women over age 72 are only recommended with certain risk factors.  
-All adults born between [facility] should be screened once for Hepatitis C. Here is a list of your current Health Maintenance items (your personalized list of preventive services) with a due date: 
Health Maintenance Due Topic Date Due  Cholesterol Test   11/09/1925 Jose R Salas Diabetic Foot Care  11/09/1935  Eye Exam  11/09/1935  
 DTaP/Tdap/Td  (1 - Tdap) 11/09/1946  Shingles Vaccine (1 of 2) 11/09/1975  Bone Mineral Density   11/09/1990  Pneumococcal Vaccine (2 of 2 - PCV13) 09/18/2010 Jose R Salas Annual Well Visit  09/06/2018

## 2019-04-18 LAB
BUN SERPL-MCNC: 15 MG/DL (ref 10–36)
BUN/CREAT SERPL: 10 (ref 12–28)
CALCIUM SERPL-MCNC: 8.8 MG/DL (ref 8.7–10.3)
CHLORIDE SERPL-SCNC: 105 MMOL/L (ref 96–106)
CO2 SERPL-SCNC: 27 MMOL/L (ref 20–29)
CREAT SERPL-MCNC: 1.54 MG/DL (ref 0.57–1)
GLUCOSE SERPL-MCNC: 89 MG/DL (ref 65–99)
POTASSIUM SERPL-SCNC: 3.4 MMOL/L (ref 3.5–5.2)
SODIUM SERPL-SCNC: 149 MMOL/L (ref 134–144)

## 2019-05-15 ENCOUNTER — APPOINTMENT (OUTPATIENT)
Dept: MRI IMAGING | Age: 84
DRG: 069 | End: 2019-05-15
Attending: INTERNAL MEDICINE
Payer: MEDICARE

## 2019-05-15 ENCOUNTER — APPOINTMENT (OUTPATIENT)
Dept: VASCULAR SURGERY | Age: 84
DRG: 069 | End: 2019-05-15
Attending: INTERNAL MEDICINE
Payer: MEDICARE

## 2019-05-15 ENCOUNTER — APPOINTMENT (OUTPATIENT)
Dept: CT IMAGING | Age: 84
DRG: 069 | End: 2019-05-15
Attending: EMERGENCY MEDICINE
Payer: MEDICARE

## 2019-05-15 ENCOUNTER — HOSPITAL ENCOUNTER (INPATIENT)
Age: 84
LOS: 1 days | Discharge: HOME HEALTH | DRG: 069 | End: 2019-05-18
Attending: EMERGENCY MEDICINE | Admitting: FAMILY MEDICINE
Payer: MEDICARE

## 2019-05-15 DIAGNOSIS — G45.9 TIA (TRANSIENT ISCHEMIC ATTACK): Primary | ICD-10-CM

## 2019-05-15 DIAGNOSIS — G44.89 OTHER HEADACHE SYNDROME: ICD-10-CM

## 2019-05-15 PROBLEM — R47.02 DYSPHASIA: Status: ACTIVE | Noted: 2019-05-15

## 2019-05-15 PROBLEM — D50.9 MICROCYTIC ANEMIA: Status: ACTIVE | Noted: 2019-05-15

## 2019-05-15 PROBLEM — R51.9 HEADACHE: Status: ACTIVE | Noted: 2019-05-15

## 2019-05-15 PROBLEM — R20.0 RIGHT FACIAL NUMBNESS: Status: ACTIVE | Noted: 2019-05-15

## 2019-05-15 PROBLEM — N39.0 UTI (URINARY TRACT INFECTION): Status: ACTIVE | Noted: 2019-05-15

## 2019-05-15 PROBLEM — R79.89 ELEVATED SERUM CREATININE: Status: ACTIVE | Noted: 2019-05-15

## 2019-05-15 PROBLEM — E87.6 HYPOKALEMIA: Status: ACTIVE | Noted: 2019-05-15

## 2019-05-15 PROBLEM — N18.30 STAGE 3 CHRONIC KIDNEY DISEASE (HCC): Status: ACTIVE | Noted: 2019-05-15

## 2019-05-15 PROBLEM — Z86.73 HISTORY OF CVA (CEREBROVASCULAR ACCIDENT): Status: ACTIVE | Noted: 2019-05-15

## 2019-05-15 PROBLEM — R82.90 ABNORMAL URINALYSIS: Status: ACTIVE | Noted: 2019-05-15

## 2019-05-15 PROBLEM — I10 HYPERTENSION: Status: ACTIVE | Noted: 2019-05-15

## 2019-05-15 LAB
ANION GAP SERPL CALC-SCNC: 5 MMOL/L (ref 5–15)
APPEARANCE UR: ABNORMAL
BACTERIA URNS QL MICRO: ABNORMAL /HPF
BASOPHILS # BLD: 0.1 K/UL (ref 0–0.1)
BASOPHILS NFR BLD: 1 % (ref 0–1)
BILIRUB UR QL: NEGATIVE
BUN SERPL-MCNC: 17 MG/DL (ref 6–20)
BUN/CREAT SERPL: 10 (ref 12–20)
CALCIUM SERPL-MCNC: 8.2 MG/DL (ref 8.5–10.1)
CHLORIDE SERPL-SCNC: 110 MMOL/L (ref 97–108)
CO2 SERPL-SCNC: 27 MMOL/L (ref 21–32)
COLOR UR: ABNORMAL
CREAT SERPL-MCNC: 1.73 MG/DL (ref 0.55–1.02)
DIFFERENTIAL METHOD BLD: ABNORMAL
EOSINOPHIL # BLD: 0.2 K/UL (ref 0–0.4)
EOSINOPHIL NFR BLD: 3 % (ref 0–7)
EPITH CASTS URNS QL MICRO: ABNORMAL /LPF
ERYTHROCYTE [DISTWIDTH] IN BLOOD BY AUTOMATED COUNT: 15.1 % (ref 11.5–14.5)
GLUCOSE SERPL-MCNC: 100 MG/DL (ref 65–100)
GLUCOSE UR STRIP.AUTO-MCNC: NEGATIVE MG/DL
HCT VFR BLD AUTO: 30.9 % (ref 35–47)
HGB BLD-MCNC: 9.8 G/DL (ref 11.5–16)
HGB UR QL STRIP: ABNORMAL
IMM GRANULOCYTES # BLD AUTO: 0 K/UL (ref 0–0.04)
IMM GRANULOCYTES NFR BLD AUTO: 1 % (ref 0–0.5)
KETONES UR QL STRIP.AUTO: NEGATIVE MG/DL
LEUKOCYTE ESTERASE UR QL STRIP.AUTO: ABNORMAL
LYMPHOCYTES # BLD: 2.1 K/UL (ref 0.8–3.5)
LYMPHOCYTES NFR BLD: 26 % (ref 12–49)
MAGNESIUM SERPL-MCNC: 1.8 MG/DL (ref 1.6–2.4)
MCH RBC QN AUTO: 23.7 PG (ref 26–34)
MCHC RBC AUTO-ENTMCNC: 31.7 G/DL (ref 30–36.5)
MCV RBC AUTO: 74.6 FL (ref 80–99)
MONOCYTES # BLD: 0.6 K/UL (ref 0–1)
MONOCYTES NFR BLD: 8 % (ref 5–13)
NEUTS SEG # BLD: 4.9 K/UL (ref 1.8–8)
NEUTS SEG NFR BLD: 61 % (ref 32–75)
NITRITE UR QL STRIP.AUTO: NEGATIVE
NRBC # BLD: 0 K/UL (ref 0–0.01)
NRBC BLD-RTO: 0 PER 100 WBC
PH UR STRIP: 7 [PH] (ref 5–8)
PLATELET # BLD AUTO: 278 K/UL (ref 150–400)
PMV BLD AUTO: 9.9 FL (ref 8.9–12.9)
POTASSIUM SERPL-SCNC: 3.1 MMOL/L (ref 3.5–5.1)
PROT UR STRIP-MCNC: 30 MG/DL
RBC # BLD AUTO: 4.14 M/UL (ref 3.8–5.2)
RBC #/AREA URNS HPF: ABNORMAL /HPF (ref 0–5)
SODIUM SERPL-SCNC: 142 MMOL/L (ref 136–145)
SP GR UR REFRACTOMETRY: 1.01 (ref 1–1.03)
TROPONIN I SERPL-MCNC: <0.05 NG/ML
TSH SERPL DL<=0.05 MIU/L-ACNC: 1.26 UIU/ML (ref 0.36–3.74)
UR CULT HOLD, URHOLD: NORMAL
UROBILINOGEN UR QL STRIP.AUTO: 0.2 EU/DL (ref 0.2–1)
WBC # BLD AUTO: 7.9 K/UL (ref 3.6–11)
WBC URNS QL MICRO: ABNORMAL /HPF (ref 0–4)

## 2019-05-15 PROCEDURE — 36416 COLLJ CAPILLARY BLOOD SPEC: CPT

## 2019-05-15 PROCEDURE — 96374 THER/PROPH/DIAG INJ IV PUSH: CPT

## 2019-05-15 PROCEDURE — 74011250637 HC RX REV CODE- 250/637: Performed by: INTERNAL MEDICINE

## 2019-05-15 PROCEDURE — 93880 EXTRACRANIAL BILAT STUDY: CPT

## 2019-05-15 PROCEDURE — 84443 ASSAY THYROID STIM HORMONE: CPT

## 2019-05-15 PROCEDURE — 80048 BASIC METABOLIC PNL TOTAL CA: CPT

## 2019-05-15 PROCEDURE — 93005 ELECTROCARDIOGRAM TRACING: CPT

## 2019-05-15 PROCEDURE — 99218 HC RM OBSERVATION: CPT

## 2019-05-15 PROCEDURE — 81001 URINALYSIS AUTO W/SCOPE: CPT

## 2019-05-15 PROCEDURE — 70450 CT HEAD/BRAIN W/O DYE: CPT

## 2019-05-15 PROCEDURE — 70544 MR ANGIOGRAPHY HEAD W/O DYE: CPT

## 2019-05-15 PROCEDURE — 87086 URINE CULTURE/COLONY COUNT: CPT

## 2019-05-15 PROCEDURE — 70551 MRI BRAIN STEM W/O DYE: CPT

## 2019-05-15 PROCEDURE — 99284 EMERGENCY DEPT VISIT MOD MDM: CPT

## 2019-05-15 PROCEDURE — 74011000250 HC RX REV CODE- 250: Performed by: INTERNAL MEDICINE

## 2019-05-15 PROCEDURE — 51701 INSERT BLADDER CATHETER: CPT

## 2019-05-15 PROCEDURE — 85025 COMPLETE CBC W/AUTO DIFF WBC: CPT

## 2019-05-15 PROCEDURE — 84484 ASSAY OF TROPONIN QUANT: CPT

## 2019-05-15 PROCEDURE — 74011250637 HC RX REV CODE- 250/637: Performed by: EMERGENCY MEDICINE

## 2019-05-15 PROCEDURE — 83735 ASSAY OF MAGNESIUM: CPT

## 2019-05-15 PROCEDURE — 74011250636 HC RX REV CODE- 250/636: Performed by: INTERNAL MEDICINE

## 2019-05-15 RX ORDER — POTASSIUM CHLORIDE 750 MG/1
40 TABLET, FILM COATED, EXTENDED RELEASE ORAL
Status: COMPLETED | OUTPATIENT
Start: 2019-05-15 | End: 2019-05-15

## 2019-05-15 RX ORDER — HEPARIN SODIUM 5000 [USP'U]/ML
5000 INJECTION, SOLUTION INTRAVENOUS; SUBCUTANEOUS EVERY 8 HOURS
Status: DISCONTINUED | OUTPATIENT
Start: 2019-05-15 | End: 2019-05-18 | Stop reason: HOSPADM

## 2019-05-15 RX ORDER — CLOPIDOGREL BISULFATE 75 MG/1
75 TABLET ORAL DAILY
Status: DISCONTINUED | OUTPATIENT
Start: 2019-05-16 | End: 2019-05-16

## 2019-05-15 RX ORDER — ATORVASTATIN CALCIUM 20 MG/1
40 TABLET, FILM COATED ORAL
Status: DISCONTINUED | OUTPATIENT
Start: 2019-05-15 | End: 2019-05-18 | Stop reason: HOSPADM

## 2019-05-15 RX ORDER — BISACODYL 5 MG
5 TABLET, DELAYED RELEASE (ENTERIC COATED) ORAL DAILY PRN
Status: DISCONTINUED | OUTPATIENT
Start: 2019-05-15 | End: 2019-05-18 | Stop reason: HOSPADM

## 2019-05-15 RX ORDER — SODIUM CHLORIDE 0.9 % (FLUSH) 0.9 %
5-40 SYRINGE (ML) INJECTION EVERY 8 HOURS
Status: DISCONTINUED | OUTPATIENT
Start: 2019-05-15 | End: 2019-05-18 | Stop reason: HOSPADM

## 2019-05-15 RX ORDER — ACETAMINOPHEN 650 MG/1
650 SUPPOSITORY RECTAL
Status: DISCONTINUED | OUTPATIENT
Start: 2019-05-15 | End: 2019-05-18 | Stop reason: HOSPADM

## 2019-05-15 RX ORDER — ONDANSETRON 2 MG/ML
4 INJECTION INTRAMUSCULAR; INTRAVENOUS
Status: DISCONTINUED | OUTPATIENT
Start: 2019-05-15 | End: 2019-05-18 | Stop reason: HOSPADM

## 2019-05-15 RX ORDER — ASPIRIN 325 MG
325 TABLET ORAL ONCE
Status: COMPLETED | OUTPATIENT
Start: 2019-05-15 | End: 2019-05-15

## 2019-05-15 RX ORDER — ACETAMINOPHEN 325 MG/1
650 TABLET ORAL
Status: DISCONTINUED | OUTPATIENT
Start: 2019-05-15 | End: 2019-05-18 | Stop reason: HOSPADM

## 2019-05-15 RX ORDER — SODIUM CHLORIDE 0.9 % (FLUSH) 0.9 %
5-40 SYRINGE (ML) INJECTION AS NEEDED
Status: DISCONTINUED | OUTPATIENT
Start: 2019-05-15 | End: 2019-05-18 | Stop reason: HOSPADM

## 2019-05-15 RX ORDER — FLUDROCORTISONE ACETATE 0.1 MG/1
0.2 TABLET ORAL DAILY
COMMUNITY
End: 2019-05-18

## 2019-05-15 RX ORDER — LABETALOL HCL 20 MG/4 ML
5 SYRINGE (ML) INTRAVENOUS
Status: DISCONTINUED | OUTPATIENT
Start: 2019-05-15 | End: 2019-05-18 | Stop reason: HOSPADM

## 2019-05-15 RX ORDER — SIMVASTATIN 40 MG/1
40 TABLET, FILM COATED ORAL
COMMUNITY
End: 2019-05-18

## 2019-05-15 RX ORDER — SODIUM CHLORIDE 9 MG/ML
100 INJECTION, SOLUTION INTRAVENOUS CONTINUOUS
Status: DISCONTINUED | OUTPATIENT
Start: 2019-05-15 | End: 2019-05-16

## 2019-05-15 RX ADMIN — ATORVASTATIN CALCIUM 40 MG: 20 TABLET, FILM COATED ORAL at 22:09

## 2019-05-15 RX ADMIN — Medication 10 ML: at 22:06

## 2019-05-15 RX ADMIN — Medication 10 ML: at 21:37

## 2019-05-15 RX ADMIN — POTASSIUM CHLORIDE 40 MEQ: 750 TABLET, EXTENDED RELEASE ORAL at 17:26

## 2019-05-15 RX ADMIN — ASPIRIN 325 MG: 325 TABLET, COATED ORAL at 17:26

## 2019-05-15 RX ADMIN — WATER 1 G: 1 INJECTION INTRAMUSCULAR; INTRAVENOUS; SUBCUTANEOUS at 21:37

## 2019-05-15 RX ADMIN — SODIUM CHLORIDE 100 ML/HR: 900 INJECTION, SOLUTION INTRAVENOUS at 22:05

## 2019-05-15 NOTE — ED TRIAGE NOTES
Arrives via EMS for slurred speech, dizziness, blurred vision, and HA starting Sunday. Last for approx 1 hour with 1 episode daily. No weakness.    Only complaint currently is a HA

## 2019-05-15 NOTE — PROGRESS NOTES
5/15/2019 
6:50 PM 
Case management note Reason for Admission:   TIA Patient came to ER for intermittent dysphasia. She also states she had headache and facial numbness. She has history of CKD, HTN and CVA in past. 
Patient did answer all questions, but family indicated they were not all correct. RRAT Score:    18 Do you (patient/family) have any concerns for transition/discharge? Concerns that patient lives alone, has no children. Family in room nieces and nephew. Does not have AD Observation letter signed and placed in chart Plan for utilizing home health: May benefit from  [facility] Current Advanced Directive/Advance Care Plan:   
 
Likelihood of readmission? High due age, poor health cognition Transition of Care Plan: 1. PCP follow up 2. Family assistance 3. HH to be ordered 4. Palliative to see for long term planning (memory, lives alone) 5. CM to follow until discharge Care Management Interventions PCP Verified by CM: Yes(dr. pippa prescott) Mode of Transport at Discharge: Self Transition of Care Consult (CM Consult): Discharge Planning Current Support Network: Lives Alone Confirm Follow Up Transport: Family Plan discussed with Pt/Family/Caregiver: Yes Discharge Location Discharge Placement: Home with family assistance [place] [facility], [address] [place]

## 2019-05-15 NOTE — H&P
[facility] Hospitalist Admission Note NAME:  Peng Mariscal :   1925 MRN:  [id number] PCP:  Juvenal Martins MD  
 
Date/Time:  5/15/2019 5:29 PM 
 
  
  
Subjective: CHIEF COMPLAINT: Dysphasia, right facial numbness HISTORY OF PRESENT ILLNESS:    
Ms. Eduardo Anand is a 80 y.o.  female with a hx of prior CVA, CKD, hypotension, XOL who presented to the Emergency Department complaining of intermittent dysphasia, right facial numbness superimposed on headache. Sx started on  with inability to speak, last 2 mins, resolved spontaneously. Similar episode this AM with facial numbness. Daughter also notes BP has been high recently which is unusual for patient. No fever, chills, CP, or SOB. Evaluated in ED and referred for observation for TIA. Past medical hx: - CKD - CVA - XOL 
- Hypotension Past surgical Hx: 
- No recent surgeries Social Hx: 
- no current tobacco use Family hx: 
- Brother lived to 80, no early cardiovascular disease noted No Known Allergies Prior to Admission medications Not on File Review of Systems: 
(bold if positive, if negative) Gen:  Eyes:  ENT:  CVS:  Pulm:  GI:  :  MS:  Skin:  Psych:  Endo:  Hem:  Renal:  Neuro:  Numbnessheadache Objective: VITALS:   
Vital signs reviewed; most recent are: 
 
Visit Vitals /88 Pulse 77 Temp 98.3 °F (36.8 °C) Resp 14 Ht 5' 5\" (1.651 m) Wt 58.1 kg (128 lb) SpO2 99% BMI 21.30 kg/m² SpO2 Readings from Last 6 Encounters:  
05/15/19 99% No intake or output data in the 24 hours ending 05/15/19 2542 Exam:  
 
Physical Exam: 
 
Gen:  Well-developed, well-nourished, in no acute distress HEENT:  Pink conjunctivae, PERRL, hearing intact to voice, moist mucous membranes Neck:  Supple, without masses, thyroid non-tender Resp:  No accessory muscle use, clear breath sounds without wheezes rales or rhonchi Card:  No murmurs, normal S1, S2 without thrills, bruits or peripheral edema Abd:  Soft, non-tender, non-distended, normoactive bowel sounds are present, no palpable organomegaly and no detectable hernias Lymph:  No cervical or inguinal adenopathy Musc:  No cyanosis or clubbing Skin:  No rashes or ulcers, skin turgor is good Neuro:  Cranial nerves are grossly intact, no focal motor weakness, follows commands appropriately Psych:  Good insight, oriented to person, place and time, alert Labs: 
 
Recent Labs 05/15/19 
1443 WBC 7.9 HGB 9.8* HCT 30.9*  Recent Labs 05/15/19 
1443   
K 3.1*  
* CO2 27  BUN 17 CREA 1.73* CA 8.2* MG 1.8 No results found for: Adaline Feeling No results for input(s): PH, PCO2, PO2, HCO3, FIO2 in the last 72 hours. No results for input(s): INR in the last 72 hours. No lab exists for component: INREXT All Micro Results Procedure Component Value Units Date/Time URINE CULTURE HOLD SAMPLE [[id number]] Collected:  05/15/19 1615 Order Status:  Completed Specimen:  Urine from Serum Updated:  05/15/19 1647 Urine culture hold URINE ON HOLD IN MICROBIOLOGY DEPT FOR 3 DAYS. IF UNPRESERVED URINE IS SUBMITTED, IT CANNOT BE USED FOR ADDITIONAL TESTING AFTER 24 HRS, RECOLLECTION WILL BE REQUIRED. I have reviewed previous records Assessment and Plan:  
  
Principal Problem: 
TIA (transient ischemic attack) / Dysphasia / Right facial numbness / Hx of CVA. POA. Certainly concerning for TIA. Admit to obs. Neurology consult. Neuro checks. MRI/MRA brain. Carotid doppler. TTE. Check FLP, A1c, TSH. Start plavix, add high potency statin empircally. Allow permissive HTN overnight Active Problems: 
 
Headache (5/15/2019). Unclear if related to stroke or elevated BP. Improved now. PRN Fioricet Hypokalemia (5/15/2019). Replete and follow Elevated serum creatinine / Stage 3 chronic kidney disease. POA. Follows with nephrology but doesn't know who, knows she has kidney disease but doesn't know level. Suspect near baseline. Hydrate overnight and monitor Is and Os. Microcytic anemia (5/15/2019). Mild. Check serologies and FOBT Abnormal urinalysis / Possible UTI (urinary tract infection). Given transient neuro sx, will treat empirically with IV CTX and follow cultures. Hypertension (5/15/2019). BP up right now, may be compensatory. Usually hypotensive and on ?hydrocortisone. Monitor BP, allow permissive HTN for now. Code status: DNR confirmed with patient and daughter Telemetry reviewed:   normal sinus rhythm Risk of deterioration: high Total time spent with patient: 79 Minutes I reviewed chart, notes, data and current medications in the medical record. I have examined and treated the patient at bedside during this period. Care Plan discussed with: Patient and Family Discussed:  Code Status, Care Plan and D/C Planning      
___________________________________________________ Attending Physician: Kirstin Tee,

## 2019-05-15 NOTE — ED NOTES
Patient states she could not speak on Sunday. Said she could look at words but could not say them. Family was present but did not seek care until today. Today is alert/oriented x 4. States she is diabetic but does not take meds nor check her blood sugars. Does not always have the best appetite. States she has had a prior stroke.

## 2019-05-15 NOTE — ED PROVIDER NOTES
80 y.o. female  who presents to the ED via EMS, with chief complaint of headache. Pt complains of headache and dizziness which started ~1200 today. She rates her current pain as 6/10 in intensity. Pt also reports experienced right sided facial numbness earlier today. She states that she had difficulty speaking on Sunday (5/12/19), noting that she \"could not get out any statements\". Reports that this episode lasted for ~1 minute, and was accompanied by right sided facial numbness. She notes that she has experienced a similar episode of speech difficulty in the past. Pt reports that she has h/o prior stroke. Family also notes that the patient's blood pressure was \"high\" today. Pt specifically denies nausea or vomiting. There are no other acute medical concerns at this time. Non smoker Review of medical records indicate that the patient has no prior ED visits documented. PCP: Perla, MD [place] 
 
Note written by Chandni Givens, as dictated by Rafia Diaz DO 4:10 PM 
 
The history is provided by the patient and medical records. No  was used. No past medical history on file. tia 2 yrs ago, htn No past surgical history on file. No brain surgery No family history on file. Social History Socioeconomic History  Marital status:  Spouse name: Not on file  Number of children: Not on file  Years of education: Not on file  Highest education level: Not on file Occupational History  Not on file Social Needs  Financial resource strain: Not on file  Food insecurity:  
  Worry: Not on file Inability: Not on file  Transportation needs:  
  Medical: Not on file Non-medical: Not on file Tobacco Use  Smoking status: Not on file Substance and Sexual Activity  Alcohol use: Not on file  Drug use: Not on file  Sexual activity: Not on file Lifestyle  Physical activity:  
  Days per week: Not on file Minutes per session: Not on file  Stress: Not on file Relationships  Social connections:  
  Talks on phone: Not on file Gets together: Not on file Attends Nondenominational service: Not on file Active member of club or organization: Not on file Attends meetings of clubs or organizations: Not on file Relationship status: Not on file  Intimate partner violence:  
  Fear of current or ex partner: Not on file Emotionally abused: Not on file Physically abused: Not on file Forced sexual activity: Not on file Other Topics Concern  Not on file Social History Narrative  Not on file ALLERGIES: Patient has no known allergies. Review of Systems Gastrointestinal: Negative for nausea and vomiting. Neurological: Positive for dizziness, numbness (right sided facial) and headaches. Negative for speech difficulty (resolved). All other systems reviewed and are negative. Vitals:  
 05/15/19 1515 05/15/19 2155 05/15/19 2157 05/15/19 2300 BP: 179/88 169/58  194/60 Pulse: 77 63  71 Resp: 14 16  15 Temp:   98.1 °F (36.7 °C) SpO2: 99% 98%  98% Weight:      
Height:      
      
 
Physical Exam  
  
Constitutional: Pt is awake and alert. Pt appears well-developed and well-nourished. NAD. HENT:  
Head: Normocephalic and atraumatic. Nose: Nose normal.  
Mouth/Throat: Oropharynx is clear and moist. No oropharyngeal exudate. Eyes: Conjunctivae and extraocular motions are normal. Right pupil is irregular, post-surgical. Left pupil is round. Right eye exhibits no discharge. Left eye exhibits no discharge. No scleral icterus. Neck: No tracheal deviation present. Supple neck. Cardiovascular: Normal rate, regular rhythm, normal heart sounds and intact distal pulses. Exam reveals no gallop and no friction rub. No murmur heard. Pulmonary/Chest: Effort normal and breath sounds normal.  Pt  has no wheezes. Pt  has no rales. Abdominal: Soft. Pt  exhibits no distension and no mass. No tenderness. Pt  has no rebound and no guarding. Musculoskeletal:  Pt  exhibits no edema and no tenderness. Ext: Normal ROM in all four extremities; not tender to palpation; distal pulses are normal, no edema. Neurological:  Pt is alert. nonfocal neuro exam. Normal strength. Normal sensory deficits. No pronator drift. Skin: Skin is warm and dry. Pt  is not diaphoretic. Psychiatric:  Pt  has a normal mood and affect. Behavior is normal.  
Note written by Chandni Meza, as dictated by Andria Cramer DO 4:10 PM 
 
MDM Procedures ED EKG interpretation: 
Time: 14:31 Rhythm: normal sinus rhythm; and regular. Rate (approx.): 74 bpm; Axis: normal; Intervals: normal; ST/T wave: no acute ST/T wave changes noted; Note written by Chandni Meza, as dictated by Andria Cramer DO 2:35 PM 
 
CONSULT NOTE: 
5:06 PM Andria Cramer DO spoke with Dr. Ren Guzman MD, Consult for Hospitalist.  Discussed available diagnostic tests and clinical findings. Dr. Mihir Martin will evaluate the patient for admission to the hospital. 
 
5:06 PM 
Patient is being admitted to the hospital.  The results of their tests and reasons for their admission have been discussed with them and/or available family. They convey agreement and understanding for the need to be admitted and for their admission diagnosis. CT noted TIA Will admit

## 2019-05-15 NOTE — PROGRESS NOTES
[facility]: MED RECONCILIATION Comments/Recommendations:  
Patient alert and oriented at time of interview. Patient confirmed name and date of birth. Patient gave permission to discuss prior to admission medication in presence of family members that were at bedside. Patient states she takes one medication in the morning for low blood pressure and she takes one medication at bedtime for cholesterol. She was not sure of the names/strengths of the medications but stated that she gets her medications filled at [facility]. Levothyroxine: patient said that she took Levothyroxine for many years but her MD stopped Levothyroxine about one month ago. Per RxQuery, appears that patient was most recently taking Levothyroxine 50 mcg by mouth daily before breakfast. 
Prior to admission medication list updated with information provided by pharmacist at [facility]. Medications added:  
 
Fludrocortisone 0.1 mg tablet 2 tablets by mouth daily Simvastatin 40 mg by mouth nightly Information obtained from: patient and pharmacist at [facility] Allergies: Patient has no known allergies. Prior to Admission Medications Prescriptions Last Dose Informant Patient Reported? Taking?  
fludrocortisone (FLORINEF) 0.1 mg tablet 5/15/2019 at Unknown time Self Yes Yes Sig: Take 0.2 mg by mouth daily. simvastatin (ZOCOR) 40 mg tablet 5/14/2019 at Unknown time Self Yes Yes Sig: Take 40 mg by mouth nightly.   
  
 
Manisha Harris, Pharmacist

## 2019-05-16 ENCOUNTER — APPOINTMENT (OUTPATIENT)
Dept: NON INVASIVE DIAGNOSTICS | Age: 84
DRG: 069 | End: 2019-05-16
Attending: INTERNAL MEDICINE
Payer: MEDICARE

## 2019-05-16 ENCOUNTER — APPOINTMENT (OUTPATIENT)
Dept: CT IMAGING | Age: 84
DRG: 069 | End: 2019-05-16
Attending: INTERNAL MEDICINE
Payer: MEDICARE

## 2019-05-16 LAB
ANION GAP SERPL CALC-SCNC: 3 MMOL/L (ref 5–15)
ATRIAL RATE: 74 BPM
BASOPHILS # BLD: 0.1 K/UL (ref 0–0.1)
BASOPHILS NFR BLD: 1 % (ref 0–1)
BUN SERPL-MCNC: 13 MG/DL (ref 6–20)
BUN/CREAT SERPL: 10 (ref 12–20)
CALCIUM SERPL-MCNC: 7.8 MG/DL (ref 8.5–10.1)
CALCULATED P AXIS, ECG09: 69 DEGREES
CALCULATED R AXIS, ECG10: 21 DEGREES
CALCULATED T AXIS, ECG11: 33 DEGREES
CHLORIDE SERPL-SCNC: 112 MMOL/L (ref 97–108)
CHOLEST SERPL-MCNC: 116 MG/DL
CO2 SERPL-SCNC: 30 MMOL/L (ref 21–32)
CREAT SERPL-MCNC: 1.35 MG/DL (ref 0.55–1.02)
DIAGNOSIS, 93000: NORMAL
DIFFERENTIAL METHOD BLD: ABNORMAL
ECHO AO ROOT DIAM: 2.65 CM
ECHO AV AREA PEAK VELOCITY: 1.6 CM2
ECHO AV AREA VTI: 1.5 CM2
ECHO AV MEAN GRADIENT: 9.5 MMHG
ECHO AV PEAK GRADIENT: 19.5 MMHG
ECHO AV PEAK VELOCITY: 220.61 CM/S
ECHO AV VTI: 47.62 CM
ECHO EST RA PRESSURE: 3 MMHG
ECHO LA MAJOR AXIS: 3.35 CM
ECHO LA TO AORTIC ROOT RATIO: 1.27
ECHO LA VOL 2C: 73.96 ML (ref 22–52)
ECHO LA VOL 4C: 60.88 ML (ref 22–52)
ECHO LA VOL BP: 73.03 ML (ref 22–52)
ECHO LA VOL/BSA BIPLANE: 44.63 ML/M2 (ref 16–28)
ECHO LA VOLUME INDEX A2C: 45.2 ML/M2 (ref 16–28)
ECHO LA VOLUME INDEX A4C: 37.2 ML/M2 (ref 16–28)
ECHO LV INTERNAL DIMENSION DIASTOLIC: 4.42 CM (ref 3.9–5.3)
ECHO LV INTERNAL DIMENSION SYSTOLIC: 2.55 CM
ECHO LV IVSD: 1.2 CM (ref 0.6–0.9)
ECHO LV MASS 2D: 223.1 G (ref 67–162)
ECHO LV MASS INDEX 2D: 136.3 G/M2 (ref 43–95)
ECHO LV POSTERIOR WALL DIASTOLIC: 1.18 CM (ref 0.6–0.9)
ECHO LVOT DIAM: 1.97 CM
ECHO LVOT PEAK GRADIENT: 5.6 MMHG
ECHO LVOT PEAK VELOCITY: 117.98 CM/S
ECHO LVOT SV: 70.1 ML
ECHO LVOT VTI: 22.95 CM
ECHO MV A VELOCITY: 116.92 CM/S
ECHO MV E DECELERATION TIME (DT): 177.2 MS
ECHO MV E VELOCITY: 109.37 CM/S
ECHO MV E/A RATIO: 0.94
ECHO MV REGURGITANT PEAK GRADIENT: 68.3 MMHG
ECHO MV REGURGITANT PEAK VELOCITY: 413.12 CM/S
ECHO PULMONARY ARTERY SYSTOLIC PRESSURE (PASP): 35.7 MMHG
ECHO PV MAX VELOCITY: 115.25 CM/S
ECHO PV PEAK GRADIENT: 5.3 MMHG
ECHO RIGHT VENTRICULAR SYSTOLIC PRESSURE (RVSP): 35.7 MMHG
ECHO RV INTERNAL DIMENSION: 3.85 CM
ECHO TV REGURGITANT MAX VELOCITY: 286.1 CM/S
ECHO TV REGURGITANT PEAK GRADIENT: 32.7 MMHG
EOSINOPHIL # BLD: 0.2 K/UL (ref 0–0.4)
EOSINOPHIL NFR BLD: 4 % (ref 0–7)
ERYTHROCYTE [DISTWIDTH] IN BLOOD BY AUTOMATED COUNT: 15.2 % (ref 11.5–14.5)
EST. AVERAGE GLUCOSE BLD GHB EST-MCNC: 103 MG/DL
FERRITIN SERPL-MCNC: 8 NG/ML (ref 8–252)
GLUCOSE SERPL-MCNC: 87 MG/DL (ref 65–100)
HAPTOGLOB SERPL-MCNC: 293 MG/DL (ref 30–200)
HBA1C MFR BLD: 5.2 % (ref 4.2–6.3)
HCT VFR BLD AUTO: 27 % (ref 35–47)
HDLC SERPL-MCNC: 42 MG/DL
HDLC SERPL: 2.8 {RATIO} (ref 0–5)
HGB BLD-MCNC: 8.1 G/DL (ref 11.5–16)
IMM GRANULOCYTES # BLD AUTO: 0 K/UL (ref 0–0.04)
IMM GRANULOCYTES NFR BLD AUTO: 0 % (ref 0–0.5)
IRON SATN MFR SERPL: 7 % (ref 20–50)
IRON SERPL-MCNC: 18 UG/DL (ref 35–150)
LDH SERPL L TO P-CCNC: 173 U/L (ref 81–246)
LDLC SERPL CALC-MCNC: 55.4 MG/DL (ref 0–100)
LEFT CCA DIST DIAS: 18.9 CM/S
LEFT CCA DIST SYS: 123.3 CM/S
LEFT CCA PROX DIAS: 13 CM/S
LEFT CCA PROX SYS: 131.3 CM/S
LEFT ECA DIAS: 9.06 CM/S
LEFT ECA SYS: 89.9 CM/S
LEFT ICA DIST DIAS: 9.5 CM/S
LEFT ICA DIST SYS: 50.8 CM/S
LEFT ICA MID DIAS: 6.8 CM/S
LEFT ICA MID SYS: 53.1 CM/S
LEFT ICA PROX DIAS: 15.6 CM/S
LEFT ICA PROX SYS: 112.1 CM/S
LEFT ICA/CCA SYS: 0.91
LEFT SUBCLAVIAN DIAS: 0 CM/S
LEFT SUBCLAVIAN SYS: 122 CM/S
LIPID PROFILE,FLP: NORMAL
LYMPHOCYTES # BLD: 1.8 K/UL (ref 0.8–3.5)
LYMPHOCYTES NFR BLD: 30 % (ref 12–49)
MAGNESIUM SERPL-MCNC: 1.8 MG/DL (ref 1.6–2.4)
MCH RBC QN AUTO: 23.3 PG (ref 26–34)
MCHC RBC AUTO-ENTMCNC: 30 G/DL (ref 30–36.5)
MCV RBC AUTO: 77.8 FL (ref 80–99)
MONOCYTES # BLD: 0.7 K/UL (ref 0–1)
MONOCYTES NFR BLD: 13 % (ref 5–13)
NEUTS SEG # BLD: 3.1 K/UL (ref 1.8–8)
NEUTS SEG NFR BLD: 52 % (ref 32–75)
NRBC # BLD: 0 K/UL (ref 0–0.01)
NRBC BLD-RTO: 0 PER 100 WBC
P-R INTERVAL, ECG05: 156 MS
PHOSPHATE SERPL-MCNC: 2.7 MG/DL (ref 2.6–4.7)
PLATELET # BLD AUTO: 247 K/UL (ref 150–400)
PMV BLD AUTO: 10 FL (ref 8.9–12.9)
POTASSIUM SERPL-SCNC: 2.9 MMOL/L (ref 3.5–5.1)
Q-T INTERVAL, ECG07: 410 MS
QRS DURATION, ECG06: 74 MS
QTC CALCULATION (BEZET), ECG08: 455 MS
RBC # BLD AUTO: 3.47 M/UL (ref 3.8–5.2)
RETICS # AUTO: 0.06 M/UL (ref 0.02–0.08)
RETICS/RBC NFR AUTO: 1.7 % (ref 0.7–2.1)
RIGHT CCA DIST DIAS: 14.1 CM/S
RIGHT CCA DIST SYS: 76.2 CM/S
RIGHT CCA PROX DIAS: 16 CM/S
RIGHT CCA PROX SYS: 93.5 CM/S
RIGHT ECA DIAS: 0 CM/S
RIGHT ECA SYS: 60.7 CM/S
RIGHT ICA DIST DIAS: 25.7 CM/S
RIGHT ICA DIST SYS: 98.4 CM/S
RIGHT ICA MID DIAS: 16 CM/S
RIGHT ICA MID SYS: 91.9 CM/S
RIGHT ICA PROX DIAS: 16 CM/S
RIGHT ICA PROX SYS: 98.4 CM/S
RIGHT ICA/CCA SYS: 1.3
RIGHT SUBCLAVIAN DIAS: 0 CM/S
RIGHT SUBCLAVIAN SYS: 103.2 CM/S
RIGHT VERTEBRAL DIAS: 12.81 CM/S
RIGHT VERTEBRAL SYS: 64.6 CM/S
SODIUM SERPL-SCNC: 145 MMOL/L (ref 136–145)
T4 FREE SERPL-MCNC: 1 NG/DL (ref 0.8–1.5)
TIBC SERPL-MCNC: 276 UG/DL (ref 250–450)
TRIGL SERPL-MCNC: 93 MG/DL (ref ?–150)
VENTRICULAR RATE, ECG03: 74 BPM
VLDLC SERPL CALC-MCNC: 18.6 MG/DL
WBC # BLD AUTO: 5.9 K/UL (ref 3.6–11)

## 2019-05-16 PROCEDURE — 83540 ASSAY OF IRON: CPT

## 2019-05-16 PROCEDURE — 99218 HC RM OBSERVATION: CPT

## 2019-05-16 PROCEDURE — 85025 COMPLETE CBC W/AUTO DIFF WBC: CPT

## 2019-05-16 PROCEDURE — 83735 ASSAY OF MAGNESIUM: CPT

## 2019-05-16 PROCEDURE — 80061 LIPID PANEL: CPT

## 2019-05-16 PROCEDURE — 97161 PT EVAL LOW COMPLEX 20 MIN: CPT

## 2019-05-16 PROCEDURE — 92610 EVALUATE SWALLOWING FUNCTION: CPT

## 2019-05-16 PROCEDURE — 74011250637 HC RX REV CODE- 250/637: Performed by: NURSE PRACTITIONER

## 2019-05-16 PROCEDURE — 85045 AUTOMATED RETICULOCYTE COUNT: CPT

## 2019-05-16 PROCEDURE — 84100 ASSAY OF PHOSPHORUS: CPT

## 2019-05-16 PROCEDURE — 84439 ASSAY OF FREE THYROXINE: CPT

## 2019-05-16 PROCEDURE — 97535 SELF CARE MNGMENT TRAINING: CPT

## 2019-05-16 PROCEDURE — 74011250636 HC RX REV CODE- 250/636: Performed by: INTERNAL MEDICINE

## 2019-05-16 PROCEDURE — 83615 LACTATE (LD) (LDH) ENZYME: CPT

## 2019-05-16 PROCEDURE — 80048 BASIC METABOLIC PNL TOTAL CA: CPT

## 2019-05-16 PROCEDURE — 94760 N-INVAS EAR/PLS OXIMETRY 1: CPT

## 2019-05-16 PROCEDURE — 74011000258 HC RX REV CODE- 258: Performed by: INTERNAL MEDICINE

## 2019-05-16 PROCEDURE — 97165 OT EVAL LOW COMPLEX 30 MIN: CPT

## 2019-05-16 PROCEDURE — 83010 ASSAY OF HAPTOGLOBIN QUANT: CPT

## 2019-05-16 PROCEDURE — 74011250637 HC RX REV CODE- 250/637: Performed by: STUDENT IN AN ORGANIZED HEALTH CARE EDUCATION/TRAINING PROGRAM

## 2019-05-16 PROCEDURE — 82728 ASSAY OF FERRITIN: CPT

## 2019-05-16 PROCEDURE — 74011250637 HC RX REV CODE- 250/637: Performed by: INTERNAL MEDICINE

## 2019-05-16 PROCEDURE — 97116 GAIT TRAINING THERAPY: CPT

## 2019-05-16 PROCEDURE — 93306 TTE W/DOPPLER COMPLETE: CPT

## 2019-05-16 PROCEDURE — 74011250637 HC RX REV CODE- 250/637: Performed by: FAMILY MEDICINE

## 2019-05-16 PROCEDURE — 36415 COLL VENOUS BLD VENIPUNCTURE: CPT

## 2019-05-16 PROCEDURE — 83036 HEMOGLOBIN GLYCOSYLATED A1C: CPT

## 2019-05-16 RX ORDER — POLYETHYLENE GLYCOL 3350 17 G/17G
17 POWDER, FOR SOLUTION ORAL DAILY
Qty: 30 PACKET | Refills: 0 | Status: SHIPPED | OUTPATIENT
Start: 2019-05-17 | End: 2020-08-31 | Stop reason: SDUPTHER

## 2019-05-16 RX ORDER — POLYETHYLENE GLYCOL 3350 17 G/17G
17 POWDER, FOR SOLUTION ORAL DAILY
Status: DISCONTINUED | OUTPATIENT
Start: 2019-05-17 | End: 2019-05-18 | Stop reason: HOSPADM

## 2019-05-16 RX ORDER — DOCUSATE SODIUM 100 MG/1
100 CAPSULE, LIQUID FILLED ORAL 2 TIMES DAILY
Qty: 60 CAP | Refills: 0 | Status: SHIPPED | OUTPATIENT
Start: 2019-05-16 | End: 2019-05-16

## 2019-05-16 RX ORDER — POTASSIUM CHLORIDE 750 MG/1
20 TABLET, FILM COATED, EXTENDED RELEASE ORAL 3 TIMES DAILY
Status: DISCONTINUED | OUTPATIENT
Start: 2019-05-16 | End: 2019-05-16

## 2019-05-16 RX ORDER — POTASSIUM CHLORIDE 1.5 G/1.77G
40 POWDER, FOR SOLUTION ORAL ONCE
Status: DISCONTINUED | OUTPATIENT
Start: 2019-05-16 | End: 2019-05-16

## 2019-05-16 RX ORDER — ATORVASTATIN CALCIUM 40 MG/1
40 TABLET, FILM COATED ORAL DAILY
Qty: 30 TAB | Refills: 0 | Status: SHIPPED | OUTPATIENT
Start: 2019-05-16 | End: 2019-05-17 | Stop reason: SDUPTHER

## 2019-05-16 RX ORDER — POTASSIUM CHLORIDE 1.5 G/1.77G
40 POWDER, FOR SOLUTION ORAL
Status: DISCONTINUED | OUTPATIENT
Start: 2019-05-16 | End: 2019-05-16

## 2019-05-16 RX ORDER — POTASSIUM CHLORIDE 1.5 G/1.77G
40 POWDER, FOR SOLUTION ORAL EVERY 4 HOURS
Status: COMPLETED | OUTPATIENT
Start: 2019-05-16 | End: 2019-05-16

## 2019-05-16 RX ORDER — POTASSIUM CHLORIDE 7.45 MG/ML
10 INJECTION INTRAVENOUS
Status: DISCONTINUED | OUTPATIENT
Start: 2019-05-16 | End: 2019-05-16

## 2019-05-16 RX ORDER — LANOLIN ALCOHOL/MO/W.PET/CERES
1 CREAM (GRAM) TOPICAL 2 TIMES DAILY WITH MEALS
Status: DISCONTINUED | OUTPATIENT
Start: 2019-05-16 | End: 2019-05-18 | Stop reason: HOSPADM

## 2019-05-16 RX ORDER — GUAIFENESIN 100 MG/5ML
81 LIQUID (ML) ORAL DAILY
Status: DISCONTINUED | OUTPATIENT
Start: 2019-05-16 | End: 2019-05-18 | Stop reason: HOSPADM

## 2019-05-16 RX ORDER — LANOLIN ALCOHOL/MO/W.PET/CERES
325 CREAM (GRAM) TOPICAL 2 TIMES DAILY WITH MEALS
Qty: 60 TAB | Refills: 0 | Status: SHIPPED | OUTPATIENT
Start: 2019-05-16 | End: 2019-05-30 | Stop reason: SDUPTHER

## 2019-05-16 RX ORDER — GUAIFENESIN 100 MG/5ML
81 LIQUID (ML) ORAL DAILY
Qty: 30 TAB | Refills: 0 | Status: SHIPPED | OUTPATIENT
Start: 2019-05-17 | End: 2019-07-17 | Stop reason: ALTCHOICE

## 2019-05-16 RX ORDER — HYDRALAZINE HYDROCHLORIDE 20 MG/ML
10 INJECTION INTRAMUSCULAR; INTRAVENOUS ONCE
Status: COMPLETED | OUTPATIENT
Start: 2019-05-16 | End: 2019-05-16

## 2019-05-16 RX ADMIN — HYDRALAZINE HYDROCHLORIDE 10 MG: 20 INJECTION INTRAMUSCULAR; INTRAVENOUS at 08:37

## 2019-05-16 RX ADMIN — FERROUS SULFATE TAB 325 MG (65 MG ELEMENTAL FE) 325 MG: 325 (65 FE) TAB at 18:02

## 2019-05-16 RX ADMIN — Medication 10 ML: at 06:10

## 2019-05-16 RX ADMIN — CLOPIDOGREL BISULFATE 75 MG: 75 TABLET ORAL at 08:37

## 2019-05-16 RX ADMIN — SODIUM CHLORIDE 100 ML/HR: 900 INJECTION, SOLUTION INTRAVENOUS at 06:11

## 2019-05-16 RX ADMIN — ATORVASTATIN CALCIUM 40 MG: 20 TABLET, FILM COATED ORAL at 21:17

## 2019-05-16 RX ADMIN — HEPARIN SODIUM 5000 UNITS: 5000 INJECTION INTRAVENOUS; SUBCUTANEOUS at 06:09

## 2019-05-16 RX ADMIN — HEPARIN SODIUM 5000 UNITS: 5000 INJECTION INTRAVENOUS; SUBCUTANEOUS at 21:17

## 2019-05-16 RX ADMIN — Medication 10 ML: at 21:17

## 2019-05-16 RX ADMIN — POTASSIUM CHLORIDE 40 MEQ: 1.5 POWDER, FOR SOLUTION ORAL at 20:43

## 2019-05-16 RX ADMIN — POTASSIUM CHLORIDE 40 MEQ: 1.5 POWDER, FOR SOLUTION ORAL at 12:45

## 2019-05-16 RX ADMIN — HEPARIN SODIUM 5000 UNITS: 5000 INJECTION INTRAVENOUS; SUBCUTANEOUS at 14:57

## 2019-05-16 RX ADMIN — ASPIRIN 81 MG 81 MG: 81 TABLET ORAL at 12:45

## 2019-05-16 RX ADMIN — POTASSIUM CHLORIDE 40 MEQ: 1.5 POWDER, FOR SOLUTION ORAL at 18:02

## 2019-05-16 NOTE — ED NOTES
Pt Throughput: Charge Nurse on [facility]  made aware of patient's bed assignment, room 331. Ally Chávez RN Emergency Dept Charge RN.

## 2019-05-16 NOTE — PROGRESS NOTES
[facility] RESIDENCY PROGRAM  
Daily Progress Note Date: 5/16/2019 Assessment/Plan:  
Vane Mejia is a 80 y.o. female with CVA, CKD, HLD and hypotension who is hospitalized for TIA. Pt was admitted to hospitalist service but PCP is Dr. Vishnu Desai. Pt was transferred to our service today. 24hr events: BP trended up to systolic of 807Z. No acute events ON. Pt states that she is back to her baseline. TIA: Pt back to baseline. No focal neurological deficits. CT head: No acute intracranial abnormality. MRI brain: No acute intracranial abnormality but did show loss of the normal left V3 vertebral artery flow void which is suspicious for stenosis or occlusion: CTA was recommended. - Vital signs per unit protocol 
- Neurology consulted and appreciate the assistance and recommendations. - Carotid Dopplers - Echocardiogram 
- Fasting Lipid panel (LDL goal <70), HgbA1C 
- Permissive HTN for the first 24-48 hours SBP<220 and DBP<110   
- ASA Daily 
- Consulted PT/OT for rehab eval 
- Neuro check q4h  
- CMP, Mg, Phos UTI: UA concerning for UTI 
- Continue rocephin - F/u urine cx Iron deficiency Anemia: POA. Hgb 9.8 on admission and downtrended to Hgb 8.1 this morning. Downtrend likely dilutional. Pt reports that she takes Ferrous sulfate 325 daily.  
- FOBT 
- Ferritin, Iron profile, Haptoglobin, LD and reticulocyte count. - Start Iron BID 
- miralax for constipation Stage 3 chronic kidney disease: Follows with nephrology but doesn't know who. Unclear what Pt's baseline is. 
- Cr improved with IV hydration since admission.  
- Monitor with daily BMP 
 
HLD: LDL: 55.4 (5/15/19) - Start Lipitor 40mg daily. FEN/GI - regular diet Activity - As tolerated DVT prophylaxis - Heparin GI prophylaxis -  None Disposition - TBD 
 
CODE STATUS:  DNR confirmed by hospitalist on Admission with patient and daughter Patient seen and discussed with Dr. Girma Bauman MD 
 Family Medicine Resident CC: I feel great Subjective No acute events overnight. Patient reports that she is back to her baseline. Denies chills, headaches, chest pain, shortness of breath, palpitations, abdominal pain, nausea and vomiting, and LE edema. Tolerating regular diet. Inpatient Medications Current Facility-Administered Medications Medication Dose Route Frequency  potassium chloride SR (KLOR-CON 10) tablet 20 mEq  20 mEq Oral TID  iopamidol (ISOVUE-370) 76 % injection 100 mL  100 mL IntraVENous RAD ONCE  
 aspirin chewable tablet 81 mg  81 mg Oral DAILY  sodium chloride (NS) flush 5-40 mL  5-40 mL IntraVENous Q8H  
 sodium chloride (NS) flush 5-40 mL  5-40 mL IntraVENous PRN  
 ondansetron (ZOFRAN) injection 4 mg  4 mg IntraVENous Q6H PRN  
 acetaminophen (TYLENOL) tablet 650 mg  650 mg Oral Q4H PRN Or  
 acetaminophen (TYLENOL) solution 650 mg  650 mg Per NG tube Q4H PRN Or  
 acetaminophen (TYLENOL) suppository 650 mg  650 mg Rectal Q4H PRN  
 0.9% sodium chloride infusion  100 mL/hr IntraVENous CONTINUOUS  
 atorvastatin (LIPITOR) tablet 40 mg  40 mg Oral QHS  bisacodyl (DULCOLAX) tablet 5 mg  5 mg Oral DAILY PRN  
 heparin (porcine) injection 5,000 Units  5,000 Units SubCUTAneous Q8H  
 labetalol (NORMODYNE;TRANDATE) 20 mg/4 mL (5 mg/mL) injection 5 mg  5 mg IntraVENous Q10MIN PRN  
 cefTRIAXone (ROCEPHIN) 1 g in 0.9% sodium chloride (MBP/ADV) 50 mL  1 g IntraVENous Q24H Allergies No Known Allergies Objective Vitals: 
Patient Vitals for the past 8 hrs: 
 Temp Pulse Resp BP SpO2  
05/16/19 0934    171/62   
05/16/19 0842    (!) 215/90   
05/16/19 0840    (!) 207/80   
05/16/19 0744    (!) 215/90  05/16/19 0740 98 °F (36.7 °C) 67 16 (!) 215/78 98 % 05/16/19 0700  67     
05/16/19 0332 98.1 °F (36.7 °C) 63 16 178/85 100 % I/O: 
 
Intake/Output Summary (Last 24 hours) at 5/16/2019 1017 Last data filed at 5/15/2019 2349 Gross per 24 hour Intake 120 ml Output  Net 120 ml Last shift: 
  No intake/output data recorded. Last 3 shifts: 
  05/14 1901 - 05/16 0700 In: 120 [P.O.:120] Out: - Physical Exam:General: No acute distress. Alert. Cooperative. Alert and Oriented x4 Head: Normocephalic. Atraumatic. Eyes:  Conjunctiva pink. Sclera white. PERRL. Nose:  Septum midline. Mucosa pink. No drainage. Throat: Mucosa pink. Moist mucous membranes. Respiratory: CTAB. No w/r/r/c.  
Cardiovascular: RRR. Normal S1,S2. GI: + bowel sounds. Nontender. No rebound tenderness or guarding. Nondistended Extremities: No edema. No palpable cord. No tenderness. 5/5 strength in all extremities. Neuro:                           No focal neurological deficits. CN II to XII grossly intact. No slurred speech or facial droop. Laboratory Data Recent Results (from the past 12 hour(s)) T4, FREE Collection Time: 05/16/19  5:47 AM  
Result Value Ref Range T4, Free 1.0 0.8 - 1.5 NG/DL  
METABOLIC PANEL, BASIC Collection Time: 05/16/19  5:47 AM  
Result Value Ref Range Sodium 145 136 - 145 mmol/L Potassium 2.9 (L) 3.5 - 5.1 mmol/L Chloride 112 (H) 97 - 108 mmol/L  
 CO2 30 21 - 32 mmol/L Anion gap 3 (L) 5 - 15 mmol/L Glucose 87 65 - 100 mg/dL BUN 13 6 - 20 MG/DL Creatinine 1.35 (H) 0.55 - 1.02 MG/DL  
 BUN/Creatinine ratio 10 (L) 12 - 20 GFR est AA 44 (L) >60 ml/min/1.73m2 GFR est non-AA 37 (L) >60 ml/min/1.73m2 Calcium 7.8 (L) 8.5 - 10.1 MG/DL  
CBC WITH AUTOMATED DIFF Collection Time: 05/16/19  5:47 AM  
Result Value Ref Range WBC 5.9 3.6 - 11.0 K/uL  
 RBC 3.47 (L) 3.80 - 5.20 M/uL HGB 8.1 (L) 11.5 - 16.0 g/dL HCT 27.0 (L) 35.0 - 47.0 % MCV 77.8 (L) 80.0 - 99.0 FL  
 MCH 23.3 (L) 26.0 - 34.0 PG  
 MCHC 30.0 30.0 - 36.5 g/dL  
 RDW 15.2 (H) 11.5 - 14.5 % PLATELET 422 046 - 806 K/uL MPV 10.0 8.9 - 12.9 FL  
 NRBC 0.0 0  WBC ABSOLUTE NRBC 0.00 0.00 - 0.01 K/uL NEUTROPHILS 52 32 - 75 % LYMPHOCYTES 30 12 - 49 % MONOCYTES 13 5 - 13 % EOSINOPHILS 4 0 - 7 % BASOPHILS 1 0 - 1 % IMMATURE GRANULOCYTES 0 0.0 - 0.5 % ABS. NEUTROPHILS 3.1 1.8 - 8.0 K/UL  
 ABS. LYMPHOCYTES 1.8 0.8 - 3.5 K/UL  
 ABS. MONOCYTES 0.7 0.0 - 1.0 K/UL  
 ABS. EOSINOPHILS 0.2 0.0 - 0.4 K/UL  
 ABS. BASOPHILS 0.1 0.0 - 0.1 K/UL  
 ABS. IMM. GRANS. 0.0 0.00 - 0.04 K/UL  
 DF AUTOMATED MAGNESIUM Collection Time: 05/16/19  5:47 AM  
Result Value Ref Range Magnesium 1.8 1.6 - 2.4 mg/dL PHOSPHORUS Collection Time: 05/16/19  5:47 AM  
Result Value Ref Range Phosphorus 2.7 2.6 - 4.7 MG/DL  
LIPID PANEL Collection Time: 05/16/19  5:47 AM  
Result Value Ref Range LIPID PROFILE Cholesterol, total 116 <200 MG/DL Triglyceride 93 <150 MG/DL  
 HDL Cholesterol 42 MG/DL  
 LDL, calculated 55.4 0 - 100 MG/DL VLDL, calculated 18.6 MG/DL  
 CHOL/HDL Ratio 2.8 0.0 - 5.0 HEMOGLOBIN A1C WITH EAG Collection Time: 05/16/19  5:47 AM  
Result Value Ref Range Hemoglobin A1c 5.2 4.2 - 6.3 % Est. average glucose 103 mg/dL Imaging: CT head: No acute intracranial abnormality. MRI brain: No acute intracranial abnormality but did show loss of the normal left V3 vertebral artery flow void which is suspicious for stenosis or occlusion: CTA was recommended. Hospital Problems: 
Hospital Problems  Never Reviewed Codes Class Noted POA * (Principal) TIA (transient ischemic attack) ICD-10-CM: G45.9 ICD-9-CM: 435.9  5/15/2019 Yes Dysphasia ICD-10-CM: R47.02 
ICD-9-CM: 784.59  5/15/2019 Yes Headache ICD-10-CM: R51 ICD-9-CM: 784.0  5/15/2019 Yes Hypokalemia ICD-10-CM: E87.6 ICD-9-CM: 276.8  5/15/2019 Yes Elevated serum creatinine ICD-10-CM: R79.89 ICD-9-CM: 790.99  5/15/2019 Yes Stage 3 chronic kidney disease (HCC) ICD-10-CM: N18.3 ICD-9-CM: 585.3  5/15/2019 Yes Microcytic anemia ICD-10-CM: D50.9 ICD-9-CM: 280.9  5/15/2019 Yes Right facial numbness ICD-10-CM: R20.0 ICD-9-CM: 782.0  5/15/2019 Yes Abnormal urinalysis ICD-10-CM: R82.90 ICD-9-CM: 791.9  5/15/2019 Yes  
   
 UTI (urinary tract infection) ICD-10-CM: N39.0 ICD-9-CM: 599.0  5/15/2019 Yes Hypertension ICD-10-CM: I10 
ICD-9-CM: 401.9  5/15/2019 Yes History of CVA (cerebrovascular accident) ICD-10-CM: Z86.73 
ICD-9-CM: V12.54  5/15/2019 Yes

## 2019-05-16 NOTE — ROUTINE PROCESS
Pt needs appropriate IV access for CTA. Charge RN attempted. ICU team notified, state they will come attempt US guided. 14:47 - Pt leaving for CT at this time. 15:40 - Call received from CT regarding non-working IV. Pt agitated and upset, refused IV reinsertion attempt by nurse. Family practice physician notified. Blood pending to be drawn, will attempt if pt agrees. 15:52 - Pt had returned from CT, refused IV reinsertion by CT tech. 15:55 - Spoke with IV insertion team member, will  Possibly attempt IV insertion.

## 2019-05-16 NOTE — ROUTINE PROCESS
Loss IV access. ICU and IV Team already attempted IV insertion, successful but IV access was lost. Family practice physician notified, requests Anesthesia to insert IV. Anesthesia called, request pt to be transferred to PACU for IV attempt. Charge RN and nurse manager notified.

## 2019-05-16 NOTE — ROUTINE PROCESS
Bedside and Verbal shift change report given to Jannette (oncoming nurse) by Alexandria Arvizu (offgoing nurse). Report included the following information SBAR, Kardex, ED Summary, Intake/Output, MAR, Accordion, Recent Results and Quality Measures.

## 2019-05-16 NOTE — PROGRESS NOTES
Problem: Self Care Deficits Care Plan (Adult) Goal: *Acute Goals and Plan of Care (Insert Text) Description Occupational Therapy Goals Initiated 5/16/2019 1. Patient will perform lower body dressing with supervision/set-up within 7 day(s). 2.  Patient will perform grooming, standing at sink, with supervision/set-up within 7 day(s). 3.  Patient will perform toilet transfers with supervision/set-up within 7 day(s). 4.  Patient will perform all aspects of toileting with supervision/set-up within 7 day(s). 5.  Patient will participate in upper extremity therapeutic exercise/activities with supervision/set-up for 10 minutes within 7 day(s). Outcome: Progressing Towards Goal 
 OCCUPATIONAL THERAPY EVALUATION Patient: José Luis Garg [de-identified]80 y.o. female) Date: 5/16/2019 Primary Diagnosis: TIA (transient ischemic attack) [G45.9] Precautions: fall ASSESSMENT : 
Based on the objective data described below, the patient presents with hospital admission secondary to slurred speech, dizziness, and headache. Patient received in room with PT performing balance testing. Patient pleasant and able to answer questions appropriately as asked. However noted episodes of difficulty following OT instruction. Noted patient with multiple LOB with ambulation in lorenz, turning to sit, and changing direction. Patient requires min assist for transfers due to decreased balance intermittently. Patient scores 66/66 on Fugl Stock UE Assessment, however deficits for patient in balance and safety. Per patient, she lives alone, and is independent with ADL tasks. Discussed decreased safety with mobility at this time and need for assist.  She reports poor experience in rehab prior and not interested rehab setting. Recommend rehab placement due to balance deficits and decreased safety with ADL tasks. Patient will benefit from skilled intervention to address the above impairments. Patients rehabilitation potential is considered to be Good Factors which may influence rehabilitation potential include: ? None noted ? Mental ability/status ? Medical condition ? Home/family situation and support systems ? Safety awareness ? Pain tolerance/management ? Other: PLAN : 
Recommendations and Planned Interventions: 
?               Self Care Training                  ? Therapeutic Activities ? Functional Mobility Training    ? Cognitive Retraining 
? Therapeutic Exercises           ? Endurance Activities ? Balance Training                   ? Neuromuscular Re-Education ? Visual/Perceptual Training     ? Home Safety Training 
? Patient Education                 ? Family Training/Education ? Other (comment): Frequency/Duration: Patient will be followed by occupational therapy 5 times a week to address goals. Discharge Recommendations: Inpatient Rehab- patient report she will think about it. If she returns home she will need assistance due to decreased balance and increased risk of all Further Equipment Recommendations for Discharge: TBD SUBJECTIVE:  
Patient stated I am almost 80years old. I've been doing this a long time.  OBJECTIVE DATA SUMMARY:  
HISTORY:  
No past medical history on file. No past surgical history on file. Prior Level of Function/Environment/Context: reports independence Occupations in which the patient is/was successful, what are the barriers preventing that success:  
Performance Patterns (routines, roles, habits, and rituals):  
Personal Interests and/or values:  
Expanded or extensive additional review of patient history:  
 
Home Situation Home Environment: Private residence Living Alone: Yes Hand dominance: Right EXAMINATION OF PERFORMANCE DEFICITS: 
Cognitive/Behavioral Status: 
Neurologic State: Alert Orientation Level: Oriented X4 Cognition: Follows commands Perception: Appears intact Perseveration: No perseveration noted Safety/Judgement: Decreased awareness of need for safety;Decreased awareness of need for assistance Skin: intact as seen Edema: none noted Hearing: 
  
Vision/Perceptual:   
    
    
    
  
    
    
  
Range of Motion: 
AROM: Within functional limits PROM: Within functional limits Strength: 
Strength: Within functional limits Coordination: 
Coordination: Within functional limits Tone & Sensation: 
 
  
  
  
  
  
  
  
  
Balance: 
Sitting: Intact; High guard Standing: Impaired; With support Standing - Static: Fair Standing - Dynamic : Poor Functional Mobility and Transfers for ADLs: 
Bed Mobility: 
Rolling: Stand-by assistance Supine to Sit: Stand-by assistance Sit to Supine: Stand-by assistance Scooting: Stand-by assistance Transfers: 
Sit to Stand: Stand-by assistance Stand to Sit: Stand-by assistance Bed to Chair: Contact guard assistance Bathroom Mobility: Minimum assistance Tub Transfer: Minimum assistance ADL Assessment: 
Feeding: Supervision Oral Facial Hygiene/Grooming: Contact guard assistance(standing) Bathing: Minimum assistance(for standing balance ) Upper Body Dressing: Supervision Lower Body Dressing: Minimum assistance(balance ) Toileting: Minimum assistance ADL Intervention and task modifications: 
  
 
  
 
  
 
  
 
  
 
  
 
  
 
Cognitive Retraining Safety/Judgement: Decreased awareness of need for safety;Decreased awareness of need for assistance Therapeutic Exercise: 
 
 
Functional Measure: 
Fugl-Stock Assessment of Motor Recovery after Stroke:  
 
Reflex Activity Flexors/Biceps/Fingers: Can be elicited Extensors/Triceps: Can be elicited Reflex Subtotal: 4 Volitional Movement Within Synergies Shoulder Retraction: Full Shoulder Elevation: Full Shoulder Abduction (90 degrees): Full Shoulder External Rotation: Full Elbow Flexion: Full Forearm Supination: Full Shoulder Adduction/Internal Rotation: Full Elbow Extension: Full Forearm Pronation: Full Subtotal: 18 
 
Volitional Movement Mixing Synergies Hand to Lumbar Spine: Full Shoulder Flexion (0-90 degrees): Full Pronation-Supination: Full Subtotal: 6 Volitional Movement With Little or No Synergy Shoulder Abduction (0-90 degrees): Full Shoulder Flexion ( degrees): Full Pronation/Supination: Full Subtotal : 6 Normal Reflex Activity Biceps, Triceps, Finger Flexors: Full Subtotal : 2 Upper Extremity Total  
Upper Extremity Total: 36 Wrist 
Stability at 15 Degree Dorsiflexion: Full Repeated Dorsiflexion/ Volar Flexion: Full Stability at 15 Degree Dorsiflexion: Full Repeated Dorsiflexion/ Volar Flexion: Full Circumduction: Full Wrist Total: 10 Hand Mass Flexion: Full Mass Extension: Full Grasp A: Full Grasp B: Full Grasp C: Full Grasp D: Full Grasp E: Full Hand Total: 14 
 
Coordination/Speed Tremor: None Dysmetria: None Time: <1s Coordination/Speed Total : 6 Total A-D Total A-D (Motor Function): 86/52 This is a reliable/valid measure of arm function after a neurological event. It has established value to characterize functional status and for measuring spontaneous and therapy-induced recovery; tests proximal and distal motor functions. Fugl-Stock Assessment  UE scores recorded between five and 30 days post neurologic event can be used to predict UE recovery at six months post neurologic event. Severe = 0-21 points Moderately Severe = 22-33 points Moderate = 34-47 points Mild = 48-66 points ETIENNE Gao, ARTURO Ventura, & LESA Huerta (1992). Measurement of motor recovery after stroke:  Outcome assessment and sample size requirements. Stroke, 23, pp. 6323-5578.  
-------------------------------------------------------------------------------------------------------------------------------------------------------------------- MCID: 
Stroke: Clement López et al, 2001; n = 171; mean age 79 (6) years; assessed within 16 (12) days of stroke, Acute Stroke) FMA Motor Scores from Admission to Discharge  
? 10 point increase in FMA Upper Extremity = 1.5 change in discharge FIM ? 10 point increase in FMA Lower Extremity = 1.9 change in discharge FIM 
MDC:  
Stroke:  
Nayla Srivastava et al, 2008, n = 14, mean age = 59.9 (14.6) years, assessed on average 14 (6.5) months post stroke, Chronic Stroke) ? FMA = 5.2 points for the Upper Extremity portion of the assessment Occupational Therapy Evaluation Charge Determination History Examination Decision-Making LOW Complexity : Brief history review  LOW Complexity : 1-3 performance deficits relating to physical, cognitive , or psychosocial skils that result in activity limitations and / or participation restrictions  LOW Complexity : No comorbidities that affect functional and no verbal or physical assistance needed to complete eval tasks Based on the above components, the patient evaluation is determined to be of the following complexity level: LOW Pain: 
Pain Scale 1: Numeric (0 - 10) Pain Intensity 1: 0 Pain Location 1: Head Activity Tolerance: VSS Please refer to the flowsheet for vital signs taken during this treatment. After treatment:  
? Patient left in no apparent distress sitting up in chair ? Patient left in no apparent distress in bed 
? Call bell left within reach ? Nursing notified ? Caregiver present ? Bed alarm activated- no box in room, RN and nursing tech notified COMMUNICATION/EDUCATION:  
The patients plan of care was discussed with: Physical Therapist and Registered Nurse. ? Home safety education was provided and the patient/caregiver indicated understanding. ? Patient/family have participated as able in goal setting and plan of care. ? Patient/family agree to work toward stated goals and plan of care. ? Patient understands intent and goals of therapy, but is neutral about his/her participation. ? Patient is unable to participate in goal setting and plan of care. This patients plan of care is appropriate for delegation to [facility]. Thank you for this referral. 
Pedro Frances OTR/L Time Calculation: 25 mins

## 2019-05-16 NOTE — PROGRESS NOTES
Problem: Mobility Impaired (Adult and Pediatric) Goal: *Acute Goals and Plan of Care (Insert Text) Description Physical Therapy Goals Initiated 5/16/2019 1. Patient will move from supine to sit and sit to supine , scoot up and down and roll side to side in bed with independence within 7 day(s). 2.  Patient will transfer from bed to chair and chair to bed with independence using the least restrictive device within 7 day(s). 3.  Patient will perform sit to stand with independence within 7 day(s). 4.  Patient will ambulate with modified independence for 300 feet with the least restrictive device within 7 day(s). Outcome: Progressing Towards Goal 
 PHYSICAL THERAPY EVALUATION- NEURO POPULATION Patient: Melva Herman [de-identified]80 y.o. female) Date: 5/16/2019 Primary Diagnosis: TIA (transient ischemic attack) [G45.9] Precautions: fall ASSESSMENT : 
Based on the objective data described below, the patient presents with decreased balance during ambulation. Patient lives alone is a single level house without steps to enter. Occasionally use quad cane that she bought but not using correctly just carrying it. Rolled on the edge of bed SBA, supine to sit SBA, sit to stand SBA, ambulate out on the [facility] hallway and noted scissoring gait pattern at times patient had loss of balance multiple times, min assist for safety. Assisted patient back to her room and OOB to chair as tolerated. Educate patient about safety and recommending inpatient rehab for her patient not agreeable top go to rehab stated she has been in before and does not like it. Explained to patient we will be recommending inpatient rehab for more dynamic balance training for safety patient continue to declined and said she will think about it. Notified nurse who agreed to monitor patient. Patient will benefit from skilled intervention to address the above impairments. Patient?s rehabilitation potential is considered to be Excellent Factors which may influence rehabilitation potential include:  
? None noted ? Mental ability/status ? Medical condition ? Home/family situation and support systems ? Safety awareness 
? Pain tolerance/management 
? Other: PLAN : 
Recommendations and Planned Interventions: ?             Bed Mobility Training             ? Neuromuscular Re-Education ? Transfer Training                   ? Orthotic/Prosthetic Training ? Gait Training                         ? Modalities ? Therapeutic Exercises           ? Edema Management/Control ? Therapeutic Activities            ? Patient and Family Training/Education ? Other (comment): Frequency/Duration: Patient will be followed by physical therapy daily to address goals. Discharge Recommendations: Inpatient Rehab Further Equipment Recommendations for Discharge: TBD SUBJECTIVE:  
Patient stated ? I live alone. ? OBJECTIVE DATA SUMMARY:  
HISTORY:   
No past medical history on file. No past surgical history on file. Prior Level of Function/Home Situation: see above note Personal factors and/or comorbidities impacting plan of care: EXAMINATION/PRESENTATION/DECISION MAKING:  
Critical Behavior: 
  
  
  
  
Hearing: 
  
 
Range Of Motion: 
AROM: Within functional limits PROM: Within functional limits Strength:   
Strength: Within functional limits Tone & Sensation:  
  
  
  
  
  
  
  
  
  
   
Coordination: 
Coordination: Within functional limits Vision:  
  
Functional Mobility: 
Bed Mobility: 
Rolling: Stand-by assistance Supine to Sit: Stand-by assistance Sit to Supine: Stand-by assistance Scooting: Stand-by assistance Transfers: 
Sit to Stand: Stand-by assistance Stand to Sit: Stand-by assistance Stand Pivot Transfers: Contact guard assistance Bed to Chair: Contact guard assistance Balance:  
Sitting: Intact; High guard Standing: Impaired; With support Standing - Static: Fair Standing - Dynamic : Poor Ambulation/Gait Training: 
Distance (ft): 100 Feet (ft) Assistive Device: Gait belt(use quad cane at home willneed a rolling walker for now) Ambulation - Level of Assistance: Minimal assistance Gait Description (WDL): Exceptions to [facility] Gait Abnormalities: Scissoring;Path deviations Base of Support: Narrowed Speed/Marah: Fluctuations; Pace decreased (<100 feet/min) Step Length: Right shortened;Left shortened Functional Measure Bingham Balance Test: 
 
Sitting to Standin Standing Unsupported: 4 Sitting with Back Unsupported: 4 Standing to Sittin Transfers: 4 Standing Unsupported with Eyes Closed: 3 Standing Unsupported with Feet Together: 2 Reach Forward with Outstretched Arm: 3  Object: 3 Turn to Look Over Shoulders: 4 Turn 360 Degrees: 3 Alternate Foot on Step/Stool: 3 Standing Unsupported One Foot in Front: 3 Stand on One Leg: 3 Total: 47 
 
 
56=Maximum possible score;  
0-20=High fall risk 21-40=Moderate fall risk 41-56=Low fall risk Bingham Balance Test and G-code impairment scale: 
Percentage of Impairment CH 
 
0% 
 CI 
 
1-19% CJ 
 
20-39% CK 
 
40-59% CL 
 
60-79% CM 
 
80-99% CN  
 
100% Andre Yauco Score 0-56 56 45-55 34-44 23-33 12-22 1-11 0 Physical Therapy Evaluation Charge Determination History Examination Presentation Decision-Making LOW Complexity : Zero comorbidities / personal factors that will impact the outcome / POC LOW Complexity : 1-2 Standardized tests and measures addressing body structure, function, activity limitation and / or participation in recreation  LOW Complexity : Stable, uncomplicated  Other outcome measures bingham balance test  LOW Based on the above components, the patient evaluation is determined to be of the following complexity level: LOW Therapeutic Exercises:  
 Instructed patient to continue active range of motion exercise on both legs while up on chair or on bed. Pain: 
Pain Scale 1: Numeric (0 - 10) Pain Intensity 1: 0 Pain Location 1: Head Activity Tolerance:  
Good. Please refer to the flowsheet for vital signs taken during this treatment. After treatment:  
?     Patient left in no apparent distress sitting up in chair ? Patient left in no apparent distress in bed 
? Call bell left within reach ? Nursing notified ? Caregiver present ? Bed alarm activated COMMUNICATION/EDUCATION:  
The patient?s plan of care was discussed with: Occupational Therapist, Registered Nurse, Certified Nursing Assistant/Patient Care Technician and patient . Patient was educated regarding her deficit(s) of decrease balance as this relates to her diagnosis of TIA. She demonstrated Excellent understanding as evidenced by recall. Patient and/or family was verbally educated on the BE FAST acronym for signs/symptoms of CVA and TIA. Informed patient to refer to the Stroke Binder for further BE FAST information. All questions answered with patient indicating good understanding. ?  Fall prevention education was provided and the patient/caregiver indicated understanding. ? Patient/family have participated as able in goal setting and plan of care. ?  Patient/family agree to work toward stated goals and plan of care. ?  Patient understands intent and goals of therapy, but is neutral about his/her participation. ? Patient is unable to participate in goal setting and plan of care. Thank you for this referral. 
Gus Triplett PT,WCC. Time Calculation: 27 mins

## 2019-05-16 NOTE — PROGRESS NOTES
Speech Pathology bedside swallow evaluation/discharge Patient: Matilda Salazar [de-identified]80 y.o. female) Date: 5/16/2019 Primary Diagnosis: TIA (transient ischemic attack) [G45.9] Precautions: aspiration ASSESSMENT : 
Based on the objective data described below, the patient presents with functional speech and swallow. She c/o a brief episode of difficulty speaking and reports that she has had several of these in the last 2 years. Currently no aphasia, dysarthria or dysphagia. Admitted with R facial numbness, dysarthra, dizzy, blurred vision, headache. PMH:  CVA, CKD, hypotension, XOL. . 
Skilled acute therapy provided by a speech-language pathologist is not indicated at this time. PLAN : 
Recommendations: 
Ok for regular diet, thin liquids. Discharge Recommendations: None SUBJECTIVE:  
Patient stated The man asked me what I wanted on my burger and I said  (nothing with her mouth in frozen position). OBJECTIVE:  
No past medical history on file. No past surgical history on file. Prior Level of Function/Home Situation:  
 Diet prior to admission: regular, thins Current Diet:  Regular, thins Cognitive and Communication Status: 
Neurologic State: Alert Orientation Level: Oriented X4 Cognition: Follows commands Perception: Appears intact slighlty [place] Perseveration: No perseveration noted Oral Assessment: 
Oral Assessment Labial: No impairment Dentition: (some caps) Oral Hygiene: [facility] Lingual: No impairment Velum: No impairment Mandible: No impairment P.O. Trials: 
Patient Position: upright in chair Vocal quality prior to P.O.: No impairment Consistency Presented: Thin liquid How Presented: Self-fed/presented;Straw;Spoon; Successive swallows ORAL PHASE:  
Bolus Acceptance: No impairment Bolus Formation/Control: No impairment Propulsion: No impairment Oral Residue: None PHARYNGEAL PHASE:  
Initiation of Swallow: No impairment Laryngeal Elevation: Functional 
 Aspiration Signs/Symptoms: None 
  
  
 speech:  
No dysarthria or aphasia. DDK: WFL 
  
  
  
NOMS:  
The NOMS functional outcome measure was used to quantify this patient's level of swallowing impairment. Based on the NOMS, the patient was determined to be at level 7 for swallow function NOMS Swallowing Levels: 
Level 1 (CN): NPO Level 2 (CM): NPO but takes consistency in therapy Level 3 (CL): Takes less than 50% of nutrition p.o. and continues with nonoral feedings; and/or safe with mod cues; and/or max diet restriction Level 4 (CK): Safe swallow but needs mod cues; and/or mod diet restriction; and/or still requires some nonoral feeding/supplements Level 5 (CJ): Safe swallow with min diet restriction; and/or needs min cues Level 6 (CI): Independent with p.o.; rare cues; usually self cues; may need to avoid some foods or needs extra time Level 7 (CH): Independent for all p.o. ARTURO. (2003). National Outcomes Measurement System (NOMS): Adult Speech-Language Pathology User's Guide. Pain: 
Pain Scale 1: Numeric (0 - 10) Pain Intensity 1: 0 Pain Location 1: Head After treatment:  
[] Patient left in no apparent distress sitting up in chair 
[x] Patient left in no apparent distress in bed 
[] Call bell left within reach [x] Nursing notified 
[] Caregiver present 
[] Bed alarm activated COMMUNICATION/EDUCATION:  
The patients plan of care including findings, recommendations, and recommended diet changes were discussed with: Registered Nurse. Patient was educated regarding Her deficit(s) of transient speech issues. as this relates to Her diagnosis of TIA. She demonstrated Good understanding as evidenced by discussion. . 
[] Posted safety precautions in patient's room. [x] Patient/family have participated as able and agree with findings and recommendations. [] Patient is unable to participate in plan of care at this time.  
 
Thank you for this referral. 
Yobany Hassan, SLP 
 Time Calculation: 15 mins

## 2019-05-16 NOTE — PROGRESS NOTES
Patient visited by [facility] Partner Volunteer on Medical Surgical Unit on 5/16/2019. Rev. Bret Contreras, [facility]  paging service: [phone])

## 2019-05-16 NOTE — PROGRESS NOTES
5/16/2019   CARE MANAGEMENT NOTE:  CM reviewed EMR for clinical updates. Noted that pt resides home alone. Transition Plan of Care: 1. Await PT/OT/ST evals and recs re: any rehab needs. 2.  Pt was admitted under OBS status and she has Medicare insurance so SNF is not an option at this time. CM will continue to follow pt and will assist with any post discharge needs as appropriate. Yves

## 2019-05-16 NOTE — ROUTINE PROCESS
Interdisciplinary team rounds were held 5/16/2019 with the following team members:Care Management, Nursing, Pharmacy, Physical Therapy and Physician. Plan of care discussed. See clinical pathway and/or care plan for interventions and desired outcomes. Plan: PT/OT/HH. Possible dc today.

## 2019-05-16 NOTE — ROUTINE PROCESS
Bedside and Verbal shift change report given to Nory Ruiz (oncoming nurse) by Mercedes Segovia (offgoing nurse). Report included the following information SBAR, Kardex, Intake/Output, MAR, Recent Results and Cardiac Rhythm .

## 2019-05-16 NOTE — CONSULTS
SHAYNA SECOURS: [address] [facility] Neurology [facility] [address] [facility] [phone] Name:   Marky Villatoro Medical record #: [id number], [id number] Admission Date: 5/15/2019 Who Consulted: Dr. Betzy Knight Reason for Consult:  dizziness and headache HISTORY OF PRESENT ILLNESS:  
 
This is a 80 y.o. female who is admitted for dysphagia and facial numbness. The Neurology Service is asked to evaluate for stroke. Ms. Taco Jacobo presented to the ED with intermittent dysphasia, right facial numbness superimposed on headache. Sx started on Sunday with inability to speak, last 2 mins, resolved spontaneously. Similar episode this AM with facial numbness. Her admission Bp was 188/59. She has not been seen by [facility] neurology before. Neuro-imaging:  
 
CT Head: No acute process MRI Brain: 1. No evidence of acute intracranial abnormality. 2. Loss of the normal left V3 vertebral artery flow void, suspicious for 
proximal left vertebral artery high-grade stenosis or occlusion. Recommend CTA 
head/neck for further evaluation. 3. Mild chronic microvascular ischemic disease. 
  
 
MRA Brain: There is a dominant right vertebral artery. The basilar artery and its branches are normal. The internal carotid, anterior cerebral, and middle cerebral arteries are patent. There is no flow-limiting intracranial stenosis. There is 
no aneurysm. There are bilateral posterior communicating arteries Care Plan discussed with: 
Patient x Family RN Care Manager Consultant/Specialist:    
 
 
Thank you for allowing the Neurology Service the pleasure of participating in the care of your patient. This patient will be discussed with my collaborating care team physician,  Dr. Crane and he may have further recommendations regarding this patient's care Alla Dominique, ACNP-BC 
==================== Attending Attestation:  
 
NEUROLOGY NOTE ADDENDUM: 
 
5/16/2019 I have reviewed the documentation provided by the nurse practitioner, discussed her findings, clinical impression, and the proposed management plans with regards to this patient's encounter. I have personally performed a face to face diagnostic evaluation on this patient. My findings are as follows: Brandie Hoyos is a 80 y.o. female who prior CVA, CKD, hypotension who was admitted for intermittent dysphasia, right facial numbness superimposed on headache. DUring admission, BP was noted to be elevated with highest /90. Patient is on Florinef for prior history of orthostatic hypotension Exam: 
Visit Vitals /62 (BP 1 Location: Right arm, BP Patient Position: Sitting) Pulse 71 Temp 97.9 °F (36.6 °C) Resp 18 Ht 5' 5\" (1.651 m) Wt 58.1 kg (128 lb) SpO2 100% BMI 21.30 kg/m² Gen: Awake, alert, follows commands Appropriate appearance, normal speech. Oriented to all spheres. No visual field defect on confrontation exam. 
Full eyes movement, with no nystagmus, no diplopia, no ptosis. Normal gag and swallow. All remaining cranial nerves were normal 
Motor function: 5/5 in all extremities Sensory: intact to LT, PP and JPS Good FTN and HTS Gait: Deferred Assessment AMS, possible due to HTN emergency MRI Brain negative for stroke Plan 1) Agree to stop Florinef 2) Lower BP by 15% to SBP goal < 1403) Discussed MRI brain was unremarkable for stroke Thank you for the consultation. Peyman Dubon MD 
Diplomate, American Board of Psychiatry and Neurology Diplomate, Neuromuscular Medicine Diplomate, [address] [place] [address] Board of Electrodiagnostic Medicine Assessment/Plan: 1. Transient Ischemic Attack, r/o Stroke: · ASA 81 mg · Will need ASA at discharge · Neurochecks:  Every 4 hours · Blood Sugar Goal:  140-180 · BP Goal: Less than 140 · Telemetry for at least 24 hours Stroke work up · A1C:  5.2 · LDL:  55.4 · TTE:  Estimated left ventricular ejection fraction is 61 - 65%. NO PFO · MRI:  No stroke · Carotid vascular imaging:  Findings are consistent with 0-49% stenosis of the right internal carotid and 0-49% stenosis of the left internal carotid with evidence of calcific and heterogeneous plaque noted in bilateral bulb and ICA Risk factors for stroke include: HTN, CAD, HLD,  physical inactivity · Discussed with patient · Discussed signs/symptoms of stroke and when to call 911 · Smoking cessation education if appropriate 3. Mobility:  
· Has been OOB. · PT/OT to eval for rehab 4. Diet:   
· Does not need SLP 5. VTE Prophylaxes: · Per Primary team   
  
 
 
Review of Systems: 10 point ROS was performed. Pertinent positives listed in HPI. Negative ROS is as follows. Pt denies: angina, palpitations, paresthesias, weakness, vision loss, slurred speech, aphasia, confusion, fever, chills, falls, headache, diplopia, back pain, neck pain, prior episodes of vertigo, hallucinations, new medications or dosage changes. Physical Exam 
 
General:   Alert, cooperative, no acute distress. Lungs:   Clear to auscultation bilaterally. No crackles/wheezes. Heart: 
Abdominal:  Normal rhythm, no carotid bruits, no peripheral edema Soft and nondistended NEUROLOGICAL EXAM: 
  
Mental Status: Oriented to time, place and person. Fully attentive. No aphasia. Full fund of knowledge. Normal recent and remote memory. Cranial Nerves:   Visual Fields:  normal in all quadrants in both eyes. EOM: no nystagmus. Facial movements:  symmetric, no ptosis Facial sensation:  intact to LT on both sides. Hearing:  normal.   
  
Language:  no dysarthria, no aphasia, normal fluency, normal repetition. Tongue: midline. Soft palate: not examined  SCMs: normal, symmetric. Reflexes:   LUExt: 2+/ 4                 RUExt: 2+/ 4 LLExt: 2+/4                  RLExt: 2+/ 4  
  
   
 Sensory:   LT and Temp intact in all extremities Cerebellar:  No resting, no postural tremors, normal finger nose finger. No pronator drift Motor:           LUExt: 5/ 5               RUExt: 5/ 5 LLExt: 5/ 5                RLExt: 5/ 5 Gait:   Not tested Allergies:  
No Known Allergies Outpatient Meds No current facility-administered medications on file prior to encounter. Current Outpatient Medications on File Prior to Encounter Medication Sig Dispense Refill  fludrocortisone (FLORINEF) 0.1 mg tablet Take 0.2 mg by mouth daily.  simvastatin (ZOCOR) 40 mg tablet Take 40 mg by mouth nightly. Inpatient Meds Current Facility-Administered Medications Medication Dose Route Frequency Provider Last Rate Last Dose  hydrALAZINE (APRESOLINE) 20 mg/mL injection 10 mg  10 mg IntraVENous ONCE Junnie Richardson, Gönyû V, DO      
 potassium chloride SR (KLOR-CON 10) tablet 20 mEq  20 mEq Oral TID Junnie Ramila Bai,       
 iopamidol (ISOVUE-370) 76 % injection 100 mL  100 mL IntraVENous RAD ONCE Timmy Wheat MD      
 sodium chloride (NS) flush 5-40 mL  5-40 mL IntraVENous Q8H Junnie Edge, Gönyû V, DO   10 mL at 05/16/19 0610  
 sodium chloride (NS) flush 5-40 mL  5-40 mL IntraVENous PRN Junnie Edge, Gönyû V, DO      
 ondansetron [facility]) injection 4 mg  4 mg IntraVENous Q6H PRN Junnie Edge, Gönyû V, DO      
 acetaminophen (TYLENOL) tablet 650 mg  650 mg Oral Q4H PRN Taylor Floras, DO Or  
 acetaminophen (TYLENOL) solution 650 mg  650 mg Per NG tube Q4H PRN Taylor Floras, DO Or  
 acetaminophen (TYLENOL) suppository 650 mg  650 mg Rectal Q4H PRN Junnie Edge, Gönyû V, DO      
 0.9% sodium chloride infusion  100 mL/hr IntraVENous CONTINUOUS Junnie Edge, Gönyû V,  mL/hr at 05/16/19 0611 100 mL/hr at 05/16/19 9436  clopidogrel (PLAVIX) tablet 75 mg  75 mg Oral DAILY Barby Miles Riley V, DO      
 atorvastatin (LIPITOR) tablet 40 mg  40 mg Oral QHS Barby Miles Riley V, DO   40 mg at 05/15/19 2209  bisacodyl (DULCOLAX) tablet 5 mg  5 mg Oral DAILY PRN Rosa Simpers, DO      
 heparin (porcine) injection 5,000 Units  5,000 Units SubCUTAneous Q8H Barby Miles Riley V, DO   5,000 Units at 05/16/19 6398  
 labetalol (NORMODYNE;TRANDATE) 20 mg/4 mL (5 mg/mL) injection 5 mg  5 mg IntraVENous Q10MIN PRN Rosa Simpers, DO   Stopped at 05/16/19 0792  cefTRIAXone (ROCEPHIN) 1 g in 0.9% sodium chloride (MBP/ADV) 50 mL  1 g IntraVENous Q24H Rosa Simpers, DO No past medical history on file. No past surgical history on file. family history is not on file. Lab Results (last 24 hrs) Recent Results (from the past 24 hour(s)) CBC WITH AUTOMATED DIFF Collection Time: 05/15/19  2:43 PM  
Result Value Ref Range WBC 7.9 3.6 - 11.0 K/uL  
 RBC 4.14 3.80 - 5.20 M/uL HGB 9.8 (L) 11.5 - 16.0 g/dL HCT 30.9 (L) 35.0 - 47.0 % MCV 74.6 (L) 80.0 - 99.0 FL  
 MCH 23.7 (L) 26.0 - 34.0 PG  
 MCHC 31.7 30.0 - 36.5 g/dL  
 RDW 15.1 (H) 11.5 - 14.5 % PLATELET 657 790 - 316 K/uL MPV 9.9 8.9 - 12.9 FL  
 NRBC 0.0 0  WBC ABSOLUTE NRBC 0.00 0.00 - 0.01 K/uL NEUTROPHILS 61 32 - 75 % LYMPHOCYTES 26 12 - 49 % MONOCYTES 8 5 - 13 % EOSINOPHILS 3 0 - 7 % BASOPHILS 1 0 - 1 % IMMATURE GRANULOCYTES 1 (H) 0.0 - 0.5 % ABS. NEUTROPHILS 4.9 1.8 - 8.0 K/UL  
 ABS. LYMPHOCYTES 2.1 0.8 - 3.5 K/UL  
 ABS. MONOCYTES 0.6 0.0 - 1.0 K/UL  
 ABS. EOSINOPHILS 0.2 0.0 - 0.4 K/UL  
 ABS. BASOPHILS 0.1 0.0 - 0.1 K/UL  
 ABS. IMM. GRANS. 0.0 0.00 - 0.04 K/UL  
 DF AUTOMATED METABOLIC PANEL, BASIC Collection Time: 05/15/19  2:43 PM  
Result Value Ref Range Sodium 142 136 - 145 mmol/L Potassium 3.1 (L) 3.5 - 5.1 mmol/L  Chloride 110 (H) 97 - 108 mmol/L  
 CO2 27 21 - 32 mmol/L Anion gap 5 5 - 15 mmol/L Glucose 100 65 - 100 mg/dL BUN 17 6 - 20 MG/DL Creatinine 1.73 (H) 0.55 - 1.02 MG/DL  
 BUN/Creatinine ratio 10 (L) 12 - 20 GFR est AA 33 (L) >60 ml/min/1.73m2 GFR est non-AA 27 (L) >60 ml/min/1.73m2 Calcium 8.2 (L) 8.5 - 10.1 MG/DL  
TROPONIN I Collection Time: 05/15/19  2:43 PM  
Result Value Ref Range Troponin-I, Qt. <0.05 <0.05 ng/mL MAGNESIUM Collection Time: 05/15/19  2:43 PM  
Result Value Ref Range Magnesium 1.8 1.6 - 2.4 mg/dL TSH 3RD GENERATION Collection Time: 05/15/19  2:43 PM  
Result Value Ref Range TSH 1.26 0.36 - 3.74 uIU/mL URINALYSIS W/MICROSCOPIC Collection Time: 05/15/19  4:15 PM  
Result Value Ref Range Color YELLOW/STRAW Appearance CLOUDY (A) CLEAR Specific gravity 1.007 1.003 - 1.030    
 pH (UA) 7.0 5.0 - 8.0 Protein 30 (A) NEG mg/dL Glucose NEGATIVE  NEG mg/dL Ketone NEGATIVE  NEG mg/dL Bilirubin NEGATIVE  NEG Blood TRACE (A) NEG Urobilinogen 0.2 0.2 - 1.0 EU/dL Nitrites NEGATIVE  NEG Leukocyte Esterase MODERATE (A) NEG    
 WBC 5-10 0 - 4 /hpf  
 RBC 0-5 0 - 5 /hpf Epithelial cells MODERATE (A) FEW /lpf Bacteria 2+ (A) NEG /hpf URINE CULTURE HOLD SAMPLE Collection Time: 05/15/19  4:15 PM  
Result Value Ref Range Urine culture hold URINE ON HOLD IN MICROBIOLOGY DEPT FOR 3 DAYS. IF UNPRESERVED URINE IS SUBMITTED, IT CANNOT BE USED FOR ADDITIONAL TESTING AFTER 24 HRS, RECOLLECTION WILL BE REQUIRED. DUPLEX CAROTID BILATERAL Collection Time: 05/15/19  6:16 PM  
Result Value Ref Range Right subclavian sys 103.2 cm/s RIGHT SUBCLAVIAN ARTERY D 0.00 cm/s Right cca dist sys 76.2 cm/s Right CCA dist flores 14.1 cm/s Right CCA prox sys 93.5 cm/s Right CCA prox flores 16.0 cm/s Right eca sys 60.7 cm/s RIGHT EXTERNAL CAROTID ARTERY D 0.00 cm/s Right ICA dist sys 98.4 cm/s Right ICA dist flores 25.7 cm/s Right ICA mid sys 91.9 cm/s Right ICA mid flores 16.0 cm/s Right ICA prox sys 98.4 cm/s Right ICA prox flores 16.0 cm/s Right vertebral sys 64.6 cm/s RIGHT VERTEBRAL ARTERY D 12.81 cm/s Right ICA/CCA sys 1.3 Left subclavian sys 122.0 cm/s LEFT SUBCLAVIAN ARTERY D 0.00 cm/s Left CCA dist sys 123.3 cm/s Left CCA dist flores 18.9 cm/s Left CCA prox sys 131.3 cm/s Left CCA prox flores 13.0 cm/s Left ECA sys 89.9 cm/s LEFT EXTERNAL CAROTID ARTERY D 9.06 cm/s Left ICA dist sys 50.8 cm/s Left ICA dist flores 9.5 cm/s Left ICA mid sys 53.1 cm/s Left ICA mid flores 6.8 cm/s Left ICA prox sys 112.1 cm/s Left ICA prox flores 15.6 cm/s Left ICA/CCA sys 7.58   
METABOLIC PANEL, BASIC Collection Time: 05/16/19  5:47 AM  
Result Value Ref Range Sodium 145 136 - 145 mmol/L Potassium 2.9 (L) 3.5 - 5.1 mmol/L Chloride 112 (H) 97 - 108 mmol/L  
 CO2 30 21 - 32 mmol/L Anion gap 3 (L) 5 - 15 mmol/L Glucose 87 65 - 100 mg/dL BUN 13 6 - 20 MG/DL Creatinine 1.35 (H) 0.55 - 1.02 MG/DL  
 BUN/Creatinine ratio 10 (L) 12 - 20 GFR est AA 44 (L) >60 ml/min/1.73m2 GFR est non-AA 37 (L) >60 ml/min/1.73m2 Calcium 7.8 (L) 8.5 - 10.1 MG/DL  
CBC WITH AUTOMATED DIFF Collection Time: 05/16/19  5:47 AM  
Result Value Ref Range WBC 5.9 3.6 - 11.0 K/uL  
 RBC 3.47 (L) 3.80 - 5.20 M/uL HGB 8.1 (L) 11.5 - 16.0 g/dL HCT 27.0 (L) 35.0 - 47.0 % MCV 77.8 (L) 80.0 - 99.0 FL  
 MCH 23.3 (L) 26.0 - 34.0 PG  
 MCHC 30.0 30.0 - 36.5 g/dL  
 RDW 15.2 (H) 11.5 - 14.5 % PLATELET 849 634 - 174 K/uL MPV 10.0 8.9 - 12.9 FL  
 NRBC 0.0 0  WBC ABSOLUTE NRBC 0.00 0.00 - 0.01 K/uL NEUTROPHILS 52 32 - 75 % LYMPHOCYTES 30 12 - 49 % MONOCYTES 13 5 - 13 % EOSINOPHILS 4 0 - 7 % BASOPHILS 1 0 - 1 % IMMATURE GRANULOCYTES 0 0.0 - 0.5 % ABS. NEUTROPHILS 3.1 1.8 - 8.0 K/UL  
 ABS. LYMPHOCYTES 1.8 0.8 - 3.5 K/UL  
 ABS. MONOCYTES 0.7 0.0 - 1.0 K/UL ABS. EOSINOPHILS 0.2 0.0 - 0.4 K/UL  
 ABS. BASOPHILS 0.1 0.0 - 0.1 K/UL  
 ABS. IMM. GRANS. 0.0 0.00 - 0.04 K/UL  
 DF AUTOMATED MAGNESIUM Collection Time: 05/16/19  5:47 AM  
Result Value Ref Range Magnesium 1.8 1.6 - 2.4 mg/dL PHOSPHORUS Collection Time: 05/16/19  5:47 AM  
Result Value Ref Range  Phosphorus 2.7 2.6 - 4.7 MG/DL

## 2019-05-16 NOTE — DISCHARGE SUMMARY
Cl[facility] [address]  Office [phone], Fax [phone] Discharge Summary Patient: Ricardo Fernando MRN: [id number] YOB: 1925 Age: 80 y.o. Date of admission:  5/15/2019 Date of discharge:  5/17/2019 Primary care provider:  Alexandro Reyes MD  
 
Admitting provider:  Ashely Gil MD 
 
Discharging provider(s): Morris Reynolds MD - Family Medicine Resident Dr. Robert Chong MD - [facility] Medicine Attending Consultations · Neurology Procedures · none Discharge destination: home with [facility] PT/OT  The patient is stable for discharge. Admission diagnosis TIA (transient ischemic attack) [G45.9] TIA (transient ischemic attack) [G45.9] HPI:  
Ms. Holli Irwin is a 80 y.o.  female with a hx of prior CVA, CKD, hypotension, XOL who presented to the Emergency Department complaining of intermittent dysphasia, right facial numbness superimposed on headache. Sx started on Sunday with inability to speak, last 2 mins, resolved spontaneously. Similar episode this AM with facial numbness. Daughter also notes BP has been high recently which is unusual for patient. No fever, chills, CP, or SOB. Evaluated in ED and referred for observation for TIA. Conditions treated during this hospitalization: 
 
TIA: Pt back to baseline. No focal neurological deficits. CT head: No acute intracranial abnormality. MRI brain: No acute intracranial abnormality but did show loss of the normal left V3 vertebral artery flow void which is suspicious for stenosis or occlusion but MRA negative. carotid dopplers negative. ECHO with EF of 61-65% with no shunting. Neurology consulted and will start asa and statin therapy. Permissive HTN for the first 24 hours.  Pt had repeat stroke sxs on day of discharge likely 2/2 drop in BP with repeat CT head that was negative, telenuro added plavix and noted safe for discharge. Pt noted that she would not want any invasive procedures at this point in her life  
- start asa 81mg daily 
- Start [id number] daily - Stop Zofor and start Lipitor 40mg daily  
- Held Florinef during admission and discharge due to HTN follow up oupt - PT rec rehab pt refused will go home with [facility]   
- Will start Norvasc 5mg daily and will need close outpt follow up UTI: UA concerning for UTI s/p CTX in ED  
-  urine cx negative  
  
Iron deficiency Anemia: POA. Hgb 9.8 on admission and downtrended to Hgb 8.1 on Hospital day 2. Downtrend likely dilutional. Pt reports that she takes Ferrous sulfate 325 daily. Retoc count wnl. Follow up Ferritin, Iron profile, Haptoglobin, LD   
- Started Iron BID Constipation  
- started on miralax Stage 3 chronic kidney disease: Follows with nephrology but doesn't know who. Unclear what Pt's baseline is. Cr improved with IV hydration since admission.  
  
HLD: LDL: 55.4 (5/15/19) - Start Lipitor 40mg daily. Labs/Imaging Needed on follow up: CBC for Hgb Pending test results: At the time of your discharge the following test results are still pending:  Ferritin, Iron profile, Haptoglobin, LD, urine culture Please make sure you review these results with your outpatient follow-up provider(s). Specific symptoms to watch for: chest pain, shortness of breath, fever, chills, nausea, vomiting, diarrhea, change in mentation, falling, weakness, bleeding. DIET/what to eat:  Regular Diet ACTIVITY:  Activity as tolerated Wound care: none Equipment needed:  none Follow-up Care: Follow-up Information Follow up With Specialties Details Why Contact Info Padmini Hernandez MD Family Practice On 5/20/2019 Please follow up with your PCP office on 5/20/2019 (Monday) at 1:00pm [address] [facility] [address] [place] [address] 
[phone] [address] [place] [address]  Homecare will start on Sat. May 18. Please call agency directly with any questions. Jed Finley [phone] 
[phone] Physical examination at discharge Visit Vitals /65 (BP 1 Location: Right arm, BP Patient Position: At rest) Pulse 69 Temp 98.5 °F (36.9 °C) Resp 18 Ht 5' 5\" (1.651 m) Wt 128 lb (58.1 kg) SpO2 100% BMI 21.30 kg/m² Physical Examination:  
General: No acute distress. Alert. Cooperative. Alert and Oriented x4 Head: Normocephalic. Atraumatic. Eyes:             Conjunctiva pink. Sclera white. PERRL. Nose:             Septum midline. Mucosa pink. No drainage. Throat: Mucosa pink. Moist mucous membranes. Respiratory: CTAB. No w/r/r/c.  
Cardiovascular: RRR. Normal S1,S2. GI: + bowel sounds. Nontender. No rebound tenderness or guarding. Nondistended Extremities: No edema. No palpable cord. No tenderness. 5/5 strength in all extremities. Neuro:                           No focal neurological deficits. CN II to XII grossly intact. No slurred speech or facial droop. Recent Results (from the past 24 hour(s)) METABOLIC PANEL, BASIC Collection Time: 05/17/19  3:31 AM  
Result Value Ref Range Sodium 144 136 - 145 mmol/L Potassium 4.1 3.5 - 5.1 mmol/L Chloride 116 (H) 97 - 108 mmol/L  
 CO2 28 21 - 32 mmol/L Anion gap 0 (L) 5 - 15 mmol/L Glucose 83 65 - 100 mg/dL BUN 14 6 - 20 MG/DL Creatinine 1.26 (H) 0.55 - 1.02 MG/DL  
 BUN/Creatinine ratio 11 (L) 12 - 20 GFR est AA 48 (L) >60 ml/min/1.73m2 GFR est non-AA 40 (L) >60 ml/min/1.73m2 Calcium 8.2 (L) 8.5 - 10.1 MG/DL  
CBC WITH AUTOMATED DIFF Collection Time: 05/17/19  3:31 AM  
Result Value Ref Range WBC 5.8 3.6 - 11.0 K/uL  
 RBC 3.45 (L) 3.80 - 5.20 M/uL HGB 8.1 (L) 11.5 - 16.0 g/dL HCT 26.6 (L) 35.0 - 47.0 %  MCV 77.1 (L) 80.0 - 99.0 FL  
 MCH 23.5 (L) 26.0 - 34.0 PG  
 MCHC 30.5 30.0 - 36.5 g/dL  
 RDW 15.4 (H) 11.5 - 14.5 % PLATELET 766 071 - 125 K/uL MPV 10.1 8.9 - 12.9 FL  
 NRBC 0.0 0  WBC ABSOLUTE NRBC 0.00 0.00 - 0.01 K/uL NEUTROPHILS 50 32 - 75 % LYMPHOCYTES 33 12 - 49 % MONOCYTES 12 5 - 13 % EOSINOPHILS 4 0 - 7 % BASOPHILS 1 0 - 1 % IMMATURE GRANULOCYTES 0 0.0 - 0.5 % ABS. NEUTROPHILS 2.9 1.8 - 8.0 K/UL  
 ABS. LYMPHOCYTES 1.9 0.8 - 3.5 K/UL  
 ABS. MONOCYTES 0.7 0.0 - 1.0 K/UL  
 ABS. EOSINOPHILS 0.2 0.0 - 0.4 K/UL  
 ABS. BASOPHILS 0.1 0.0 - 0.1 K/UL  
 ABS. IMM. GRANS. 0.0 0.00 - 0.04 K/UL  
 DF AUTOMATED    
GLUCOSE, POC Collection Time: 05/17/19  1:08 PM  
Result Value Ref Range Glucose (POC) 166 (H) 65 - 100 mg/dL Performed by Bárbara Castillo Current Discharge Medication List  
  
START taking these medications Details  
amLODIPine (NORVASC) 5 mg tablet Take 1 Tab by mouth daily. Qty: 30 Tab, Refills: 0  
  
atorvastatin (LIPITOR) 40 mg tablet Take 1 Tab by mouth daily. Qty: 30 Tab, Refills: 0  
  
clopidogrel (PLAVIX) 75 mg tab Take 1 Tab by mouth daily. Qty: 30 Tab, Refills: 0  
  
ferrous sulfate 325 mg (65 mg iron) tablet Take 1 Tab by mouth two (2) times daily (with meals). Qty: 60 Tab, Refills: 0  
  
aspirin 81 mg chewable tablet Take 1 Tab by mouth daily. Qty: 30 Tab, Refills: 0  
  
polyethylene glycol (MIRALAX) 17 gram packet Take 1 Packet by mouth daily. Qty: 30 Packet, Refills: 0 STOP taking these medications  
  
 fludrocortisone (FLORINEF) 0.1 mg tablet Comments:  
Reason for Stopping:   
   
 simvastatin (ZOCOR) 40 mg tablet Comments:  
Reason for Stopping:   
   
  
 
 
Admission imaging studies: No results found for this or any previous visit. No results found for this or any previous visit. Results from Hospital Encounter encounter on 05/15/19 CT CODE NEURO HEAD WO CONTRAST Narrative EXAM: CT CODE NEURO HEAD WO CONTRAST Clinical history: Left-sided weakness INDICATION: Code S L side weakness COMPARISON: 5/16/2019. CONTRAST: None. TECHNIQUE: Unenhanced CT of the head was performed using 5 mm images. Brain and 
bone windows were generated. CT dose reduction was achieved through use of a 
standardized protocol tailored for this examination and automatic exposure 
control for dose modulation. FINDINGS: 
Mild sulcal and ventricular prominence. . Minimal scattered hypodensities in the 
cerebral white matter. . There is no intracranial hemorrhage, extra-axial 
collection, mass, mass effect or midline shift. The basilar cisterns are open. No acute infarct is identified. The bone windows demonstrate no abnormalities. The visualized portions of the paranasal sinuses and mastoid air cells are 
clear. Vertebral vascular calcifications. Impression IMPRESSION: No acute intracranial process No procedure found. 
 
 
------------------------------------------------------------------------------------------------------------------- Chronic Diagnoses:   
Problem List as of 5/17/2019 Never Reviewed Codes Class Noted - Resolved * (Principal) TIA (transient ischemic attack) ICD-10-CM: G45.9 ICD-9-CM: 435.9  5/15/2019 - Present Dysphasia ICD-10-CM: R47.02 
ICD-9-CM: 784.59  5/15/2019 - Present Headache ICD-10-CM: R51 ICD-9-CM: 784.0  5/15/2019 - Present Hypokalemia ICD-10-CM: E87.6 ICD-9-CM: 276.8  5/15/2019 - Present Elevated serum creatinine ICD-10-CM: R79.89 ICD-9-CM: 790.99  5/15/2019 - Present Stage 3 chronic kidney disease (HCC) ICD-10-CM: N18.3 ICD-9-CM: 585.3  5/15/2019 - Present Microcytic anemia ICD-10-CM: D50.9 ICD-9-CM: 280.9  5/15/2019 - Present Right facial numbness ICD-10-CM: R20.0 ICD-9-CM: 782.0  5/15/2019 - Present Abnormal urinalysis ICD-10-CM: R82.90 ICD-9-CM: 791.9  5/15/2019 - Present UTI (urinary tract infection) ICD-10-CM: N39.0 ICD-9-CM: 599.0  5/15/2019 - Present Hypertension ICD-10-CM: I10 
ICD-9-CM: 401.9  5/15/2019 - Present History of CVA (cerebrovascular accident) ICD-10-CM: Z86.73 
ICD-9-CM: V12.54  5/15/2019 - Present Signed:  
 
 Francois Lofton MD 
 Family Medicine Resident 5/17/2019 Dr. Mariana Moeller MD 
 [facility] Medicine Attending

## 2019-05-16 NOTE — PROGRESS NOTES
Noted that patient is under care of Dr. Lilia Long. Spoke with [facility] FP team, gave sign out and updated on condition. Their team and attending, Dr. Josh Ferrara, will assume care going forward.

## 2019-05-17 ENCOUNTER — APPOINTMENT (OUTPATIENT)
Dept: CT IMAGING | Age: 84
DRG: 069 | End: 2019-05-17
Attending: STUDENT IN AN ORGANIZED HEALTH CARE EDUCATION/TRAINING PROGRAM
Payer: MEDICARE

## 2019-05-17 ENCOUNTER — APPOINTMENT (OUTPATIENT)
Dept: CT IMAGING | Age: 84
DRG: 069 | End: 2019-05-17
Attending: FAMILY MEDICINE
Payer: MEDICARE

## 2019-05-17 LAB
ANION GAP SERPL CALC-SCNC: 0 MMOL/L (ref 5–15)
BACTERIA SPEC CULT: NORMAL
BASOPHILS # BLD: 0.1 K/UL (ref 0–0.1)
BASOPHILS NFR BLD: 1 % (ref 0–1)
BUN SERPL-MCNC: 14 MG/DL (ref 6–20)
BUN/CREAT SERPL: 11 (ref 12–20)
CALCIUM SERPL-MCNC: 8.2 MG/DL (ref 8.5–10.1)
CC UR VC: NORMAL
CHLORIDE SERPL-SCNC: 116 MMOL/L (ref 97–108)
CO2 SERPL-SCNC: 28 MMOL/L (ref 21–32)
CREAT SERPL-MCNC: 1.26 MG/DL (ref 0.55–1.02)
DIFFERENTIAL METHOD BLD: ABNORMAL
EOSINOPHIL # BLD: 0.2 K/UL (ref 0–0.4)
EOSINOPHIL NFR BLD: 4 % (ref 0–7)
ERYTHROCYTE [DISTWIDTH] IN BLOOD BY AUTOMATED COUNT: 15.4 % (ref 11.5–14.5)
GLUCOSE BLD STRIP.AUTO-MCNC: 166 MG/DL (ref 65–100)
GLUCOSE SERPL-MCNC: 83 MG/DL (ref 65–100)
HCT VFR BLD AUTO: 26.6 % (ref 35–47)
HGB BLD-MCNC: 8.1 G/DL (ref 11.5–16)
IMM GRANULOCYTES # BLD AUTO: 0 K/UL (ref 0–0.04)
IMM GRANULOCYTES NFR BLD AUTO: 0 % (ref 0–0.5)
LYMPHOCYTES # BLD: 1.9 K/UL (ref 0.8–3.5)
LYMPHOCYTES NFR BLD: 33 % (ref 12–49)
MCH RBC QN AUTO: 23.5 PG (ref 26–34)
MCHC RBC AUTO-ENTMCNC: 30.5 G/DL (ref 30–36.5)
MCV RBC AUTO: 77.1 FL (ref 80–99)
MONOCYTES # BLD: 0.7 K/UL (ref 0–1)
MONOCYTES NFR BLD: 12 % (ref 5–13)
NEUTS SEG # BLD: 2.9 K/UL (ref 1.8–8)
NEUTS SEG NFR BLD: 50 % (ref 32–75)
NRBC # BLD: 0 K/UL (ref 0–0.01)
NRBC BLD-RTO: 0 PER 100 WBC
PLATELET # BLD AUTO: 246 K/UL (ref 150–400)
PMV BLD AUTO: 10.1 FL (ref 8.9–12.9)
POTASSIUM SERPL-SCNC: 4.1 MMOL/L (ref 3.5–5.1)
RBC # BLD AUTO: 3.45 M/UL (ref 3.8–5.2)
SERVICE CMNT-IMP: ABNORMAL
SERVICE CMNT-IMP: NORMAL
SODIUM SERPL-SCNC: 144 MMOL/L (ref 136–145)
WBC # BLD AUTO: 5.8 K/UL (ref 3.6–11)

## 2019-05-17 PROCEDURE — 74011250636 HC RX REV CODE- 250/636: Performed by: FAMILY MEDICINE

## 2019-05-17 PROCEDURE — 74011250637 HC RX REV CODE- 250/637: Performed by: FAMILY MEDICINE

## 2019-05-17 PROCEDURE — 80048 BASIC METABOLIC PNL TOTAL CA: CPT

## 2019-05-17 PROCEDURE — 99218 HC RM OBSERVATION: CPT

## 2019-05-17 PROCEDURE — 70496 CT ANGIOGRAPHY HEAD: CPT

## 2019-05-17 PROCEDURE — 97116 GAIT TRAINING THERAPY: CPT

## 2019-05-17 PROCEDURE — 97535 SELF CARE MNGMENT TRAINING: CPT

## 2019-05-17 PROCEDURE — 82962 GLUCOSE BLOOD TEST: CPT

## 2019-05-17 PROCEDURE — 65660000000 HC RM CCU STEPDOWN

## 2019-05-17 PROCEDURE — 74011250637 HC RX REV CODE- 250/637: Performed by: INTERNAL MEDICINE

## 2019-05-17 PROCEDURE — 74011636320 HC RX REV CODE- 636/320: Performed by: RADIOLOGY

## 2019-05-17 PROCEDURE — 70450 CT HEAD/BRAIN W/O DYE: CPT

## 2019-05-17 PROCEDURE — 94760 N-INVAS EAR/PLS OXIMETRY 1: CPT

## 2019-05-17 PROCEDURE — 97530 THERAPEUTIC ACTIVITIES: CPT

## 2019-05-17 PROCEDURE — 85025 COMPLETE CBC W/AUTO DIFF WBC: CPT

## 2019-05-17 PROCEDURE — 36415 COLL VENOUS BLD VENIPUNCTURE: CPT

## 2019-05-17 PROCEDURE — 74011250637 HC RX REV CODE- 250/637: Performed by: NURSE PRACTITIONER

## 2019-05-17 PROCEDURE — 74011250636 HC RX REV CODE- 250/636: Performed by: INTERNAL MEDICINE

## 2019-05-17 RX ORDER — AMLODIPINE BESYLATE 5 MG/1
5 TABLET ORAL DAILY
Qty: 30 TAB | Refills: 0 | Status: SHIPPED | OUTPATIENT
Start: 2019-05-18 | End: 2019-05-30 | Stop reason: SDUPTHER

## 2019-05-17 RX ORDER — HYDRALAZINE HYDROCHLORIDE 20 MG/ML
10 INJECTION INTRAMUSCULAR; INTRAVENOUS
Status: DISCONTINUED | OUTPATIENT
Start: 2019-05-17 | End: 2019-05-17

## 2019-05-17 RX ORDER — LEVOFLOXACIN 5 MG/ML
750 INJECTION, SOLUTION INTRAVENOUS EVERY 24 HOURS
Status: DISCONTINUED | OUTPATIENT
Start: 2019-05-17 | End: 2019-05-17

## 2019-05-17 RX ORDER — ATORVASTATIN CALCIUM 40 MG/1
40 TABLET, FILM COATED ORAL DAILY
Qty: 30 TAB | Refills: 0 | Status: SHIPPED | OUTPATIENT
Start: 2019-05-17 | End: 2019-05-30 | Stop reason: SDUPTHER

## 2019-05-17 RX ORDER — CLOPIDOGREL BISULFATE 75 MG/1
75 TABLET ORAL
Status: COMPLETED | OUTPATIENT
Start: 2019-05-17 | End: 2019-05-17

## 2019-05-17 RX ORDER — PREDNISONE 20 MG/1
40 TABLET ORAL
Status: DISCONTINUED | OUTPATIENT
Start: 2019-05-18 | End: 2019-05-17

## 2019-05-17 RX ORDER — HYDRALAZINE HYDROCHLORIDE 20 MG/ML
5 INJECTION INTRAMUSCULAR; INTRAVENOUS
Status: DISCONTINUED | OUTPATIENT
Start: 2019-05-17 | End: 2019-05-18 | Stop reason: HOSPADM

## 2019-05-17 RX ORDER — CLOPIDOGREL BISULFATE 75 MG/1
75 TABLET ORAL DAILY
Qty: 30 TAB | Refills: 0 | Status: SHIPPED | OUTPATIENT
Start: 2019-05-17 | End: 2019-05-30 | Stop reason: SDUPTHER

## 2019-05-17 RX ORDER — AMLODIPINE BESYLATE 5 MG/1
5 TABLET ORAL DAILY
Status: DISCONTINUED | OUTPATIENT
Start: 2019-05-17 | End: 2019-05-18 | Stop reason: HOSPADM

## 2019-05-17 RX ADMIN — CLOPIDOGREL BISULFATE 75 MG: 75 TABLET ORAL at 17:36

## 2019-05-17 RX ADMIN — AMLODIPINE BESYLATE 5 MG: 5 TABLET ORAL at 09:07

## 2019-05-17 RX ADMIN — HEPARIN SODIUM 5000 UNITS: 5000 INJECTION INTRAVENOUS; SUBCUTANEOUS at 14:20

## 2019-05-17 RX ADMIN — Medication 10 ML: at 05:39

## 2019-05-17 RX ADMIN — HEPARIN SODIUM 5000 UNITS: 5000 INJECTION INTRAVENOUS; SUBCUTANEOUS at 05:37

## 2019-05-17 RX ADMIN — IOPAMIDOL 100 ML: 755 INJECTION, SOLUTION INTRAVENOUS at 14:00

## 2019-05-17 RX ADMIN — ASPIRIN 81 MG 81 MG: 81 TABLET ORAL at 09:07

## 2019-05-17 RX ADMIN — Medication 10 ML: at 14:21

## 2019-05-17 RX ADMIN — FERROUS SULFATE TAB 325 MG (65 MG ELEMENTAL FE) 325 MG: 325 (65 FE) TAB at 17:36

## 2019-05-17 RX ADMIN — Medication 10 ML: at 22:50

## 2019-05-17 RX ADMIN — ACETAMINOPHEN 650 MG: 325 TABLET ORAL at 22:50

## 2019-05-17 RX ADMIN — FERROUS SULFATE TAB 325 MG (65 MG ELEMENTAL FE) 325 MG: 325 (65 FE) TAB at 09:07

## 2019-05-17 RX ADMIN — HEPARIN SODIUM 5000 UNITS: 5000 INJECTION INTRAVENOUS; SUBCUTANEOUS at 22:50

## 2019-05-17 RX ADMIN — ATORVASTATIN CALCIUM 40 MG: 20 TABLET, FILM COATED ORAL at 22:50

## 2019-05-17 RX ADMIN — HYDRALAZINE HYDROCHLORIDE 10 MG: 20 INJECTION INTRAMUSCULAR; INTRAVENOUS at 10:33

## 2019-05-17 NOTE — PROGRESS NOTES
[facility] FAMILY MEDICINE RESIDENCY PROGRAM  
Daily Progress Note Date: 5/17/2019 Assessment/Plan:  
Rachel Almendarez is a 80 y.o. female with CVA, CKD, HLD and hypotension who is hospitalized for TIA. 24hr events: CTA head and neck was not obtained due to loss of vascular access TIA: Pt back to baseline. No focal neurological deficits. CT head: No acute intracranial abnormality. MRI brain: No acute intracranial abnormality but did show loss of the normal left V3 vertebral artery flow void which is suspicious for stenosis or occlusion: CTA was recommended. carotid dopplers negative. ECHO with EF of 61-65% with no shunting. A1c 5.2  
- Vital signs per unit protocol 
- Neurology consulted and appreciate the assistance and recommendations. - Fasting Lipid panel (LDL goal <70),  
- Permissive HTN for the first 24-48 hours SBP<220 and DBP<110  Then SBP goal < 140 
- ASA Daily 
- Consulted PT/OT for rehab eval 
- Neuro check q4h  
- CMP, Mg, Phos UTI: UA concerning for UTI 
- Continue rocephin - F/u urine cx Iron deficiency Anemia: POA. Hgb 9.8 on admission and downtrended to Hgb 8.1 this morning. Downtrend likely dilutional. Pt reports that she takes Ferrous sulfate 325 daily.  
- FOBT 
- Ferritin, Iron profile, Haptoglobin, LD and reticulocyte count. - Start Iron BID 
- miralax for constipation Stage 3 chronic kidney disease: Follows with nephrology but doesn't know who. Unclear what Pt's baseline is. 
- Cr improved with IV hydration since admission.  
- Monitor with daily BMP 
 
HLD: LDL: 55.4 (5/15/19) - Start Lipitor 40mg daily. FEN/GI - regular diet Activity - As tolerated DVT prophylaxis - Heparin GI prophylaxis -  None Disposition - TBD 
 
CODE STATUS:  DNR confirmed by hospitalist on Admission with patient and daughter Patient seen and discussed with Dr. Ronak Medrano MD 
Family Medicine Resident CC: I feel great Subjective No acute events overnight. Patient reports that she is back to her baseline. Denies chills, headaches, chest pain, shortness of breath, palpitations, abdominal pain, nausea and vomiting, and LE edema. Tolerating regular diet. Inpatient Medications Current Facility-Administered Medications Medication Dose Route Frequency  hydrALAZINE (APRESOLINE) 20 mg/mL injection 10 mg  10 mg IntraVENous Q6H PRN  
 aspirin chewable tablet 81 mg  81 mg Oral DAILY  ferrous sulfate tablet 325 mg  1 Tab Oral BID WITH MEALS  polyethylene glycol (MIRALAX) packet 17 g  17 g Oral DAILY  sodium chloride (NS) flush 5-40 mL  5-40 mL IntraVENous Q8H  
 sodium chloride (NS) flush 5-40 mL  5-40 mL IntraVENous PRN  
 ondansetron (ZOFRAN) injection 4 mg  4 mg IntraVENous Q6H PRN  
 acetaminophen (TYLENOL) tablet 650 mg  650 mg Oral Q4H PRN Or  
 acetaminophen (TYLENOL) solution 650 mg  650 mg Per NG tube Q4H PRN Or  
 acetaminophen (TYLENOL) suppository 650 mg  650 mg Rectal Q4H PRN  
 atorvastatin (LIPITOR) tablet 40 mg  40 mg Oral QHS  bisacodyl (DULCOLAX) tablet 5 mg  5 mg Oral DAILY PRN  
 heparin (porcine) injection 5,000 Units  5,000 Units SubCUTAneous Q8H  
 labetalol (NORMODYNE;TRANDATE) 20 mg/4 mL (5 mg/mL) injection 5 mg  5 mg IntraVENous Q10MIN PRN  
 cefTRIAXone (ROCEPHIN) 1 g in 0.9% sodium chloride (MBP/ADV) 50 mL  1 g IntraVENous Q24H Allergies No Known Allergies Objective Vitals: 
Patient Vitals for the past 8 hrs: 
 Temp Pulse Resp BP SpO2  
05/17/19 0016 98.1 °F (36.7 °C) 67 18 190/66 98 % I/O: 
 
Intake/Output Summary (Last 24 hours) at 5/17/2019 3496 Last data filed at 5/16/2019 0254 Gross per 24 hour Intake 360 ml Output  Net 360 ml Last shift: 
  No intake/output data recorded. Last 3 shifts: 
  05/15 0701 - 05/16 1900 In: 480 [P.O.:480] Out: - Physical Exam:General: No acute distress. Alert. Cooperative. Alert and Oriented x4 Head: Normocephalic. Atraumatic. Eyes:  Conjunctiva pink. Sclera white. PERRL. Nose:  Septum midline. Mucosa pink. No drainage. Throat: Mucosa pink. Moist mucous membranes. Respiratory: CTAB. No w/r/r/c.  
Cardiovascular: RRR. Normal S1,S2. GI: + bowel sounds. Nontender. No rebound tenderness or guarding. Nondistended Extremities: No edema. No palpable cord. No tenderness. 5/5 strength in all extremities. Neuro:                           No focal neurological deficits. CN II to XII grossly intact. No slurred speech or facial droop. Laboratory Data Recent Results (from the past 12 hour(s)) METABOLIC PANEL, BASIC Collection Time: 05/17/19  3:31 AM  
Result Value Ref Range Sodium 144 136 - 145 mmol/L Potassium 4.1 3.5 - 5.1 mmol/L Chloride 116 (H) 97 - 108 mmol/L  
 CO2 28 21 - 32 mmol/L Anion gap 0 (L) 5 - 15 mmol/L Glucose 83 65 - 100 mg/dL BUN 14 6 - 20 MG/DL Creatinine 1.26 (H) 0.55 - 1.02 MG/DL  
 BUN/Creatinine ratio 11 (L) 12 - 20 GFR est AA 48 (L) >60 ml/min/1.73m2 GFR est non-AA 40 (L) >60 ml/min/1.73m2 Calcium 8.2 (L) 8.5 - 10.1 MG/DL  
CBC WITH AUTOMATED DIFF Collection Time: 05/17/19  3:31 AM  
Result Value Ref Range WBC 5.8 3.6 - 11.0 K/uL  
 RBC 3.45 (L) 3.80 - 5.20 M/uL HGB 8.1 (L) 11.5 - 16.0 g/dL HCT 26.6 (L) 35.0 - 47.0 % MCV 77.1 (L) 80.0 - 99.0 FL  
 MCH 23.5 (L) 26.0 - 34.0 PG  
 MCHC 30.5 30.0 - 36.5 g/dL  
 RDW 15.4 (H) 11.5 - 14.5 % PLATELET 931 350 - 029 K/uL MPV 10.1 8.9 - 12.9 FL  
 NRBC 0.0 0  WBC ABSOLUTE NRBC 0.00 0.00 - 0.01 K/uL NEUTROPHILS 50 32 - 75 % LYMPHOCYTES 33 12 - 49 % MONOCYTES 12 5 - 13 % EOSINOPHILS 4 0 - 7 % BASOPHILS 1 0 - 1 % IMMATURE GRANULOCYTES 0 0.0 - 0.5 % ABS. NEUTROPHILS 2.9 1.8 - 8.0 K/UL  
 ABS. LYMPHOCYTES 1.9 0.8 - 3.5 K/UL  
 ABS. MONOCYTES 0.7 0.0 - 1.0 K/UL  
 ABS. EOSINOPHILS 0.2 0.0 - 0.4 K/UL  
 ABS. BASOPHILS 0.1 0.0 - 0.1 K/UL ABS. IMM. GRANS. 0.0 0.00 - 0.04 K/UL  
 DF AUTOMATED Imaging: CT head: No acute intracranial abnormality. MRI brain: No acute intracranial abnormality but did show loss of the normal left V3 vertebral artery flow void which is suspicious for stenosis or occlusion: CTA was recommended. Hospital Problems: 
Hospital Problems  Never Reviewed Codes Class Noted POA * (Principal) TIA (transient ischemic attack) ICD-10-CM: G45.9 ICD-9-CM: 435.9  5/15/2019 Yes Dysphasia ICD-10-CM: R47.02 
ICD-9-CM: 784.59  5/15/2019 Yes Headache ICD-10-CM: R51 ICD-9-CM: 784.0  5/15/2019 Yes Hypokalemia ICD-10-CM: E87.6 ICD-9-CM: 276.8  5/15/2019 Yes Elevated serum creatinine ICD-10-CM: R79.89 ICD-9-CM: 790.99  5/15/2019 Yes Stage 3 chronic kidney disease (HCC) ICD-10-CM: N18.3 ICD-9-CM: 585.3  5/15/2019 Yes Microcytic anemia ICD-10-CM: D50.9 ICD-9-CM: 280.9  5/15/2019 Yes Right facial numbness ICD-10-CM: R20.0 ICD-9-CM: 782.0  5/15/2019 Yes Abnormal urinalysis ICD-10-CM: R82.90 ICD-9-CM: 791.9  5/15/2019 Yes  
   
 UTI (urinary tract infection) ICD-10-CM: N39.0 ICD-9-CM: 599.0  5/15/2019 Yes Hypertension ICD-10-CM: I10 
ICD-9-CM: 401.9  5/15/2019 Yes History of CVA (cerebrovascular accident) ICD-10-CM: Z86.73 
ICD-9-CM: V12.54  5/15/2019 Yes

## 2019-05-17 NOTE — PROGRESS NOTES
[address] [facility] ICU RN responded to Code Stroke call at [phone]. Upon arrival, the patient had a flaccid L arm, left facial droop, slurred speech, and numbness/tingling. Pt VAN positive and taken for CTA/CT. In transit to CT, the patient began to use her L side spontaneously. NIH scale completed upon return from CT, score of a 2 obtained. Dr. Ulices Roth teleneurology came on and assessed the patient. Discussed with family practice, no new orders at this time. ICU left bedside at 1355.

## 2019-05-17 NOTE — PROGRESS NOTES
RN getting patient ready for discharge, IV pulled out, tele disconnected. Patient was dressed, waiting for volunteer to bring wheelchair up. Patient's nephew had gone downstairs to bring car around to the front. RN was getting ready to place patient on the wheelchair to be brought down when patient started complaining of L arm numbness, \"heaviness\", dizziness, L facial numbness and \"tightness\". RN noticed L facial droop and L arm drift. CODE S CALLED 1304. ICU RNs came up, placed a new IV, placed patient back on tele, and wheeled patient down to CT. As patient came back to room 513, NIH was performed - score of 2. Teleneuro came on the monitor. Bedside RN performed STAND. No signs of difficulty swallowing at the moment. Nephew at bedside, assisting patient with her lunch. Will wait for MD orders and continue to monitor patient.

## 2019-05-17 NOTE — PROGRESS NOTES
Spiritual Care Assessment/Progress Note [address] 
 
 
NAME: Sofy Moreno      MRN: [id number] AGE: 80 y.o. SEX: female Buddhist Affiliation:   
Language: [place] 5/17/2019     Total Time (in minutes): 6 Spiritual Assessment begun in [facility] 5M1 MED SURG 1 through conversation with: 
  
    []Patient        [] Family    [] Friend(s) Reason for Consult: Other (comment)(o) Spiritual beliefs: (Please include comment if needed) 
   [] Identifies with a janice tradition:     
   [] Supported by a janice community:        
   [] Claims no spiritual orientation:       
   [] Seeking spiritual identity:            
   [] Adheres to an individual form of spirituality:       
   [x] Not able to assess:                   
 
    
Identified resources for coping:  
   [] Prayer                           
   [] Music                  [] Guided Imagery 
   [] Family/friends                 [] Pet visits [] Devotional reading                         [] Unknown 
   [] Other:                                  
 
 
Interventions offered during this visit: (See comments for more details) Patient Interventions: Initial visit(Attempted) Plan of Care: 
 
 [] Support spiritual and/or cultural needs  
 [] Support AMD and/or advance care planning process    
 [] Support grieving process 
 [] Coordinate Rites and/or Rituals  
 [] Coordination with community clergy [] No spiritual needs identified at this time 
 [] Detailed Plan of Care below (See Comments)  [] Make referral to Music Therapy 
[] Make referral to Pet Therapy    
[] Make referral to Addiction services 
[] Make referral to [facility] 
[] Make referral to Spiritual Care Partner 
[] No future visits requested       
[x] Follow up visits as needed Comments: Responded to Code Stroke on 5 Med Surg. No family was present as staff worked with [facility]. Chaplains available as able and/or needed. Visited by: Nam Latif., MS., [address] ([id number]) Visited by: Nam Latif., MS., [address] ([phone])

## 2019-05-17 NOTE — PROGRESS NOTES
[facility] Dosing Services: 5/17/19 Pharmacist Renal Dosing Progress Note for levaquin Physician Dr. Donn Mahoney The following medication: levaquin was automatically dose-adjusted per [facility] P&T Committee Protocol, with respect to renal function. Consult provided for this   80 y.o. , female , for the indication of COPD exacerbation. Pt Weight:  
Wt Readings from Last 1 Encounters:  
05/16/19 58.1 kg (128 lb) Previous Regimen Levaquin 750mg IV every 24 hours Serum Creatinine Lab Results Component Value Date/Time Creatinine 1.26 (H) 05/17/2019 03:31 AM  
 
   
Creatinine Clearance Estimated Creatinine Clearance: 25.1 mL/min (A) (based on SCr of 1.26 mg/dL (H)). BUN Lab Results Component Value Date/Time BUN 14 05/17/2019 03:31 AM  
 
   
Dosage changed to:  Levaquin 750mg IV every 48 hours for CrCl <50mL/min Pharmacy to continue to monitor patient daily. Will make dosage adjustments based upon changing renal function. Signed Jenn Prado. Contact information:  [phone]

## 2019-05-17 NOTE — PHYSICIAN ADVISORY
Letter of Status Determination:  
Recommend hospitalization status upgraded from OBSERVATION  to INPATIENT  Status Pt Name:  Ricardo Fernando MR#  
DIANA # [id number] / 
[id number] Payor: Joanne Olivas / Plan: [address] / Product Type: Medicare /   
CAREY#  [id number] Room and Hospital  513/01  @ [facility] Hospitalization date  5/15/2019  2:19 PM  
Current Attending Physician  Codi Snow, * Principal diagnosis  TIA (transient ischemic attack) Clinicals  Ms. Holli Irwin is a 80 y.o.  female with a hx of prior CVA, CKD, hypotension, XOL who presented to the Emergency Department complaining of intermittent dysphasia, right facial numbness superimposed on headache. Sx started on Sunday with inability to speak, last 2 mins, resolved spontaneously. Similar episode this AM with facial numbness. Daughter also notes BP has been high recently which is unusual for patient. No fever, chills, CP, or SOB. Evaluated in ED and referred for observation for TIA. Pt with resolution of symptoms, MRI with  loss of the normal left V3 vertebral artery flow void which is suspicious for stenosis or occlusion. May need CTA, Pt in Hypertensive urgency, poorly controlled BP, needs medical optimization [place] (MCG) criteria STATUS DETERMINATION  INPATIENT The final decision of the patient's hospitalization status depends on the attending physician's judgment Additional comments Payor: Joanne Olivas / Plan: [address] / Product Type: Medicare /   
  
 
Isma Lim MD 
Cell: [phone] Physician Advisor Nicolas Dowd

## 2019-05-17 NOTE — PROGRESS NOTES
[facility]. Medicine Residency Program  
 
Resident Progress Note in Brief S: Code stroke called on the patient. . Patient seen and examined immediately at bedside. Per nursing, she c/o  LE numbness and  States symptoms have resolved and she is already regaining feeling in her legs. Earlier she refused any w/u if any thrombosis was found on imaging O: 
Visit Vitals /46 Pulse 75 Temp 97.7 °F (36.5 °C) Resp 16 Ht 5' 5\" (1.651 m) Wt 128 lb (58.1 kg) SpO2 100% BMI 21.30 kg/m² Physical Examination:  
General appearance - alert, well appearing, and in no distress Neurological - alert, oriented, normal speech, no focal findings Extremities -  Able to raise all extremities A/P:  Will get stat code neuro head and neck CTA Hung Radha Will hold on discharge for now Patient discussed and seen with senior Dr. Marielena Summers and Attending physician, Gail Mcdaniel. Vandana Rea MD 
Family Medicine Resident 1:18 PM

## 2019-05-17 NOTE — PROGRESS NOTES
Neurology Hospital Progress Note Patient ID: 
Johnny Shell [id number] 
89 y.o. 
11/9/1925 Subjective:  
History: 
Johnny Shell is a 80 y.o. female who prior CVA, CKD, hypotension who was admitted for intermittent dysphasia, right facial numbness superimposed on headache. DUring admission, BP was noted to be elevated with highest /90. Patient is on Florinef for prior history of orthostatic hypotension. \ Patient doing better today with no new symptoms. ROS: 
Per HPI- 
Otherwise the remainder of the review of system was negative Social Hx: 
Social History Socioeconomic History  Marital status:  Spouse name: Not on file  Number of children: Not on file  Years of education: Not on file  Highest education level: Not on file Meds: 
No current facility-administered medications on file prior to encounter. Current Outpatient Medications on File Prior to Encounter Medication Sig Dispense Refill  fludrocortisone (FLORINEF) 0.1 mg tablet Take 0.2 mg by mouth daily.  simvastatin (ZOCOR) 40 mg tablet Take 40 mg by mouth nightly. Imaging: CT Results (recent): 
Results from Hospital Encounter encounter on 05/15/19 CT HEAD WO CONT Narrative EXAM:  CT HEAD WITHOUT CONTRAST INDICATION: Problems with speech 3 days ago now resolved, weakness. COMPARISON: None. CONTRAST: None. TECHNIQUE: Unenhanced CT of the head was performed using 5 mm images. Brain and 
bone windows were generated. Sagittal and coronal reformations were generated. CT dose reduction was achieved through use of a standardized protocol tailored 
for this examination and automatic exposure control for dose modulation. FINDINGS: 
The ventricles and sulci are enlarged in a generalized fashion consistent with 
volume loss. There is atherosclerotic calcification and no significant white matter disease. There are some basal ganglia calcifications. There is no intracranial hemorrhage. There is no extra-axial collection, mass, mass effect or midline shift. The basilar cisterns are open. No acute infarct is identified. The bone windows demonstrate no abnormalities. The visualized portions of the paranasal sinuses and mastoid air cells are 
clear. Impression IMPRESSION: No acute intracranial abnormality. MRI Results (recent): 
Results from Hospital Encounter encounter on 05/15/19 MRA BRAIN WO CONT Narrative INDICATION:   TIA 
 
COMPARISON:  None TECHNIQUE:   
3-D time-of-flight MRA of the brain was performed. Multiplanar reconstructions 
were obtained. FINDINGS: 
There is a dominant right vertebral artery. The basilar artery and its branches 
are normal. The internal carotid, anterior cerebral, and middle cerebral 
arteries are patent. There is no flow-limiting intracranial stenosis. There is 
no aneurysm. There are bilateral posterior communicating arteries. Jody Gearing Impression IMPRESSION:  No aneurysm or thrombus identified. Jody Gearing IR Results (recent): No results found for this or any previous visit. VAS/US Results (recent): No results found for this or any previous visit. Reviewed records in [facility] [place] tab today Lab Review Admission on 05/15/2019 No results displayed because visit has over 200 results. Objective:  
 
 
Exam: 
Visit Vitals BP (!) 188/91 (BP 1 Location: Right arm, BP Patient Position: At rest) Pulse 69 Temp 98.3 °F (36.8 °C) Resp 18 Ht 5' 5\" (1.651 m) Wt 58.1 kg (128 lb) SpO2 100% BMI 21.30 kg/m² Gen: Awake, alert, follows commands Appropriate appearance, normal speech. Oriented to all spheres. No visual field defect on confrontation exam. 
Full eyes movement, with no nystagmus, no diplopia, no ptosis. Normal gag and swallow. All remaining cranial nerves were normal 
Motor function: 5/5 in all extremities Sensory: intact to LT, PP and JPS 
 Good FTN and HTS Gait: Deferred Assessment:  
AMS, possible due to HTN emergency MRI Brain negative for stroke Plan:  
 
1) Agree to stop Florinef 2) Lower BP by 15% to SBP goal < 140 
3) Discussed MRI brain was unremarkable for stroke 4) Already on ASA 81 for stroke prevention 
  
Please call for questions 35 mins of time spent, 50% of which was spent on counseling and coordination of care. Yaritza Gerardo MD 
Diplomate, American Board of Psychiatry and Neurology Diplomate, Neuromuscular Medicine Diplomate, [address] [place] [address] Board of Electrodiagnostic Medicine

## 2019-05-17 NOTE — PROGRESS NOTES
Problem: Self Care Deficits Care Plan (Adult) Goal: *Acute Goals and Plan of Care (Insert Text) Description Occupational Therapy Goals Initiated 5/16/2019 1. Patient will perform lower body dressing with supervision/set-up within 7 day(s). 2.  Patient will perform grooming, standing at sink, with supervision/set-up within 7 day(s). 3.  Patient will perform toilet transfers with supervision/set-up within 7 day(s). 4.  Patient will perform all aspects of toileting with supervision/set-up within 7 day(s). 5.  Patient will participate in upper extremity therapeutic exercise/activities with supervision/set-up for 10 minutes within 7 day(s). Outcome: Progressing Towards Goal 
 OCCUPATIONAL THERAPY TREATMENT Patient: Gia Salvage [de-identified]80 y.o. female) Date: 5/17/2019 Diagnosis: TIA (transient ischemic attack) [G45.9] TIA (transient ischemic attack) [G45.9] TIA (transient ischemic attack) Precautions:   
Chart, occupational therapy assessment, plan of care, and goals were reviewed. ASSESSMENT: 
Patient received supine in bed, agreeable to activity, but reports her ride is on the way to get her. Patient able to move to EOB with CGA. Patient performs sit to stand and transfers with CGA. Patient with improved balance today but not yet safe to perform ADLs without assistance. However, patient not interested in rehab placement of any kind. Patient able to demonstrate toilet transfers as well as distal LE dressing with CGA. Dynamic sitting balance initially challenging. Patient left in chair with alarm set at end of session. Progression toward goals: 
?       Improving appropriately and progressing toward goals ? Improving slowly and progressing toward goals ? Not making progress toward goals and plan of care will be adjusted PLAN: 
Patient continues to benefit from skilled intervention to address the above impairments. Continue treatment per established plan of care. Discharge Recommendations:  Rehab is recommendation, patient refuses and will discharge home. If she returns home recommend [place] and supervision for safety. Further Equipment Recommendations for Discharge:  none noted SUBJECTIVE:  
Patient stated ? I am 80yrs old this year. I am not a kindergartener. I make decisions for me.? OBJECTIVE DATA SUMMARY:  
Cognitive/Behavioral Status: 
Neurologic State: Confused Orientation Level: Disoriented to situation Cognition: Impulsive Perception: Appears intact Perseveration: No perseveration noted Safety/Judgement: Decreased awareness of need for safety;Decreased awareness of need for assistance Functional Mobility and Transfers for ADLs: 
Bed Mobility: 
Supine to Sit: Contact guard assistance Sit to Supine: Contact guard assistance Transfers: 
Sit to Stand: Contact guard assistance Functional Transfers Bathroom Mobility: Contact guard assistance Toilet Transfer : Contact guard assistance Balance: 
Sitting: Intact Standing: Intact ADL Intervention: Lower Body Dressing Assistance Socks: Supervision Leg Crossed Method Used: Yes Position Performed: Seated edge of bed Cognitive Retraining Safety/Judgement: Decreased awareness of need for safety;Decreased awareness of need for assistance Neuro Re-Education: 
  
  
  
Therapeutic Exercises:  
 
Pain: 
Pain Scale 1: Numeric (0 - 10) Pain Intensity 1: 0 Activity Tolerance: VSS Please refer to the flowsheet for vital signs taken during this treatment. After treatment:  
? Patient left in no apparent distress sitting up in chair ? Patient left in no apparent distress in bed 
? Call bell left within reach ? Nursing notified ? Caregiver present ? Bed alarm activated COMMUNICATION/COLLABORATION:  
The patient?s plan of care was discussed with: Physical Therapist and Registered Nurse Lesli Giles OTR/L Time Calculation: 15 mins

## 2019-05-17 NOTE — PROGRESS NOTES
5/17/19   CM ADDENDUM:  [facility] declined pt's admission 2/2 to no available bed. Pt does not want to go to SNF \"I'm too old to go to rehab. \" Orders for home health skilled nursing, PT, OT were received and a list of agencies was provided to pt. CM made a referral to [facility] per pt choice and they have accepted. Start of care will be Sat. 5/18. Yves 5/17/2019   CARE MANAGEMENT NOTE:  CM reviewed EMR for clinical updates. Noted that pt resides home alone.   
  
Transition Plan of Care: 1. PT/OT/ST evals complete - pt ambulated 100 feet and rehab was recommended 2/2 decreased balance. 2.  Pt was admitted under OBS however status was changed to inpt on 5/17/19. 3.  CM made referral to [address] [place] [address] and await their evaluation and decision re: admission. If SNF is warranted then pt would require a three night qualifying hospital stay beginning count starts 5/17 so earliest hospital discharge would be Monday, 5/20. 
  
CM will continue to follow pt and will assist with  post discharge needs as appropriate. Yves

## 2019-05-17 NOTE — PROGRESS NOTES
Problem: Mobility Impaired (Adult and Pediatric) Goal: *Acute Goals and Plan of Care (Insert Text) Description Physical Therapy Goals Initiated 5/16/2019 1. Patient will move from supine to sit and sit to supine , scoot up and down and roll side to side in bed with independence within 7 day(s). 2.  Patient will transfer from bed to chair and chair to bed with independence using the least restrictive device within 7 day(s). 3.  Patient will perform sit to stand with independence within 7 day(s). 4.  Patient will ambulate with modified independence for 300 feet with the least restrictive device within 7 day(s). Note: PHYSICAL THERAPY TREATMENT Patient: Elba Maldonado [de-identified]80 y.o. female) Date: 5/17/2019 Diagnosis: TIA (transient ischemic attack) [G45.9] TIA (transient ischemic attack) [G45.9] TIA (transient ischemic attack) Precautions:   
Chart, physical therapy assessment, plan of care and goals were reviewed. ASSESSMENT: 
Pt moves slowly CGA supine to sit to stand. Pt ambulated 75ft with no device CGA. Pt slightly unsteady but with no loss of balance. Pt was then given a RW and ambulated 75ft with CGA to SBA. Pt with increased gait stability today. Pt would benefit from short SNF stay for Rehab but has declined. Home  health PT with supervision is recommended if pt goes home. Pt reports having RW. Progression toward goals: 
?    Improving appropriately and progressing toward goals ? Improving slowly and progressing toward goals ? Not making progress toward goals and plan of care will be adjusted PLAN: 
Patient continues to benefit from skilled intervention to address the above impairments. Continue treatment per established plan of care. Discharge Recommendations:  SNF for rehab but if pt refuses then Home health with supervision Further Equipment Recommendations for Discharge:  rolling walker SUBJECTIVE:  
 
OBJECTIVE DATA SUMMARY:  
Critical Behavior: Neurologic State: Confused Orientation Level: Disoriented to situation Cognition: Impulsive, Memory loss Safety/Judgement: Decreased awareness of need for safety, Decreased awareness of need for assistance Functional Mobility Training: 
Bed Mobility: 
  
Supine to Sit: Contact guard assistance Sit to Supine: Contact guard assistance Transfers: 
Sit to Stand: Contact guard assistance Stand to Sit: Contact guard assistance Balance: 
Sitting: Intact Standing: Intact Ambulation/Gait Training: 
Distance (ft): 150 Feet (ft) Assistive Device: Walker and no devicerolling;Gait belt Ambulation - Level of Assistance: Contact guard assistance Gait Abnormalities: Decreased step clearance Base of Support: Narrowed Speed/Marah: Pace decreased (<100 feet/min) Step Length: Right shortened Pain: 
Pain Scale 1: Numeric (0 - 10) Pain Intensity 1: 0 Activity Tolerance:  
Pt tolerated treatment well. Please refer to the flowsheet for vital signs taken during this treatment. After treatment:  
?    Patient left in no apparent distress sitting up in chair ? Patient left in no apparent distress in bed 
? Call bell left within reach ? Nursing notified ? Caregiver present ? Bed alarm activated COMMUNICATION/COLLABORATION:  
The patient?s plan of care was discussed with: Physical Therapist 
 
Jory Torres PTA Time Calculation: 23 mins

## 2019-05-17 NOTE — PROGRESS NOTES
LATE ENTRY FOR 1700 - VAT to room 513 to attempt IV access. 20 GA 1.75\" ultra sound guided PIV inserted to R brachial vein p 2 attempts. Phoned to CT that IV is in and they can do CT as ordered. Pt's nurse aware.

## 2019-05-17 NOTE — DISCHARGE SUMMARY
Patient: Sofy Moreno MRN: [id number] YOB: 1925 Age: 80 y. o.  
  
Date of admission:  5/15/2019 
  
Date of discharge:  5/18//2019 
  
Primary care provider:  Padmini Hernandez MD  
  
Admitting provider:  Brooks Velasco MD 
  
Discharging provider(s): Yousif Buitrago MD - Family Medicine Resident Dr. Didi Schaefer MD - [facility] Medicine Attending  
  
Consultations · Neurology   
  
Procedures · none 
  
Discharge destination: home with [facility] PT/OT  The patient is stable for discharge. 
  
Admission diagnosis · TIA (transient ischemic attack) [G45.9] · TIA (transient ischemic attack) [G45.9]   
  
HPI:  
Ms. Alicea is a 80 y. o.   female with a hx of prior CVA, CKD, hypotension, [place] presented to the Emergency Department complaining of intermittent dysphasia, right facial numbness superimposed on headache. Sx started on Sunday with inability to speak, last 2 mins, resolved spontaneously. Similar episode this AM with facial numbness. Daughter also notes BP has been high recently which is unusual for patient. No fever, chills, CP, or SOB. Evaluated in ED and referred for observation for TIA.   
  
Conditions treated during this hospitalization: 
  
TIA: Pt back to baseline. No focal neurological deficits. CT head: No acute intracranial abnormality. MRI brain: No acute intracranial abnormality but did show loss of the normal left V3 vertebral artery flow void which is suspicious for stenosis or occlusion but MRA negative. carotid dopplers negative. ECHO with EF of 61-65% with no shunting. Neurology consulted and will start asa and statin therapy. Permissive HTN for the first 24 hours. Pt had repeat stroke sxs on day of discharge likely 2/2 drop in BP with repeat CT head that was negative, telenuro added plavix and noted safe for discharge. Pt noted that she would not want any invasive procedures at this point in her life - start asa 81mg daily 
- Start [id number] daily - Stop Zofor and start Lipitor 40mg daily  
- Held Florinef during admission and discharge due to HTN follow up oupt - PT rec rehab pt refused will go home with [facility]   
- Will start Norvasc 5mg daily and will need close outpt follow up  
  
UTI: UA concerning for UTI s/p CTX in ED  
-  urine cx negative  
  
Iron deficiency Anemia: POA. Hgb 9.8 on admission and downtrended to Hgb 8.1 on Hospital day 2. Downtrend likely dilutional. Pt reports that she takes Ferrous sulfate 325 daily. Retoc count wnl. Follow up Ferritin, Iron profile, Haptoglobin, LD   
- Started Iron BID 
  
Constipation  
- started on miralax  
  
Stage 3 chronic kidney disease: Follows with nephrology but doesn't know who. Unclear what Pt's baseline is. Cr improved with IV hydration since admission.  
  
HLD: LDL: 55.4 (5/15/19) - Start Lipitor 40mg daily.  
  
Labs/Imaging Needed on follow up: CBC for Hgb  
  
Pending test results: At the time of your discharge the following test results are still pending:  Ferritin, Iron profile, Haptoglobin, LD, urine culture Please make sure you review these results with your outpatient follow-up provider(s).   
Specific symptoms to watch for: chest pain, shortness of breath, fever, chills, nausea, vomiting, diarrhea, change in mentation, falling, weakness, bleeding.  
  
DIET/what to eat:  Regular Diet 
  
ACTIVITY:  Activity as tolerated 
  
Wound care: none 
  
Equipment needed:  none 
  
Follow-up Care: Follow-up Information   
  Follow up With Specialties Details Why Contact Info  
  Polina Cordoba MD Family Practice On 5/20/2019 Please follow up with your PCP office on 5/20/2019 (Monday) at 1:00pm [address] [facility] [address] [facility] 
[phone] 
   
  [address] [place] [address]   Homecare will start on Sat. May 18. Please call agency directly with any questions.  [address] 
 [place] [address] 
[phone]  
   
  
  
Physical examination at discharge Visit Vitals /65 (BP 1 Location: Right arm, BP Patient Position: At rest) Pulse 69 Temp 98.5 °F (36.9 °C) Resp 18 Ht 5' 5\" (1.651 m) Wt 128 lb (58.1 kg) SpO2 100% BMI 21.30 kg/m²  
   
Physical Examination:  
General: No acute distress. Alert. Cooperative. Alert and Oriented x4 Head: Normocephalic. Atraumatic. Eyes:             Conjunctiva pink. Sclera white. PERRL. Nose:             Septum midline. Mucosa pink. No drainage. Throat: Mucosa pink. Moist mucous membranes. Respiratory: CTAB. No w/r/r/c.  
Cardiovascular: RRR. Normal S1,S2. GI: + bowel sounds. Nontender. No rebound tenderness or guarding. Nondistended Extremities: No edema. No palpable cord. No tenderness. 5/5 strength in all extremities. Neuro:                           No focal neurological deficits. CN II to XII grossly intact. No slurred speech or facial droop. 
  
Recent Results Recent Results (from the past 24 hour(s)) METABOLIC PANEL, BASIC  
  Collection Time: 05/17/19  3:31 AM  
Result Value Ref Range  
  Sodium 144 136 - 145 mmol/L  
  Potassium 4.1 3.5 - 5.1 mmol/L  
  Chloride 116 (H) 97 - 108 mmol/L  
  CO2 28 21 - 32 mmol/L  
  Anion gap 0 (L) 5 - 15 mmol/L  
  Glucose 83 65 - 100 mg/dL  
  BUN 14 6 - 20 MG/DL  
  Creatinine 1.26 (H) 0.55 - 1.02 MG/DL  
  BUN/Creatinine ratio 11 (L) 12 - 20    
  GFR est AA 48 (L) >60 ml/min/1.73m2  
  GFR est non-AA 40 (L) >60 ml/min/1.73m2  
  Calcium 8.2 (L) 8.5 - 10.1 MG/DL  
CBC WITH AUTOMATED DIFF  
  Collection Time: 05/17/19  3:31 AM  
Result Value Ref Range  
  WBC 5.8 3.6 - 11.0 K/uL  
  RBC 3.45 (L) 3.80 - 5.20 M/uL  
  HGB 8.1 (L) 11.5 - 16.0 g/dL  
  HCT 26.6 (L) 35.0 - 47.0 %  
  MCV 77.1 (L) 80.0 - 99.0 FL  
  MCH 23.5 (L) 26.0 - 34.0 PG  
  MCHC 30.5 30.0 - 36.5 g/dL  
  RDW 15.4 (H) 11.5 - 14.5 %  
  PLATELET 720 082 - 336 K/uL  
  MPV 10.1 8.9 - 12.9 FL  
   NRBC 0.0 0  WBC  
  ABSOLUTE NRBC 0.00 0.00 - 0.01 K/uL  
  NEUTROPHILS 50 32 - 75 %  
  LYMPHOCYTES 33 12 - 49 %  
  MONOCYTES 12 5 - 13 %  
  EOSINOPHILS 4 0 - 7 %  
  BASOPHILS 1 0 - 1 %  
  IMMATURE GRANULOCYTES 0 0.0 - 0.5 %  
  ABS. NEUTROPHILS 2.9 1.8 - 8.0 K/UL  
  ABS. LYMPHOCYTES 1.9 0.8 - 3.5 K/UL  
  ABS. MONOCYTES 0.7 0.0 - 1.0 K/UL  
  ABS. EOSINOPHILS 0.2 0.0 - 0.4 K/UL  
  ABS. BASOPHILS 0.1 0.0 - 0.1 K/UL  
  ABS. IMM. GRANS. 0.0 0.00 - 0.04 K/UL  
  DF AUTOMATED    
GLUCOSE, POC  
  Collection Time: 05/17/19  1:08 PM  
Result Value Ref Range  
  Glucose (POC) 166 (H) 65 - 100 mg/dL  
  Performed by Linda Barboza    
  
  
     
 
  
Current Discharge Medication List  
   
     
START taking these medications  
  Details  
amLODIPine (NORVASC) 5 mg tablet Take 1 Tab by mouth daily. Qty: 30 Tab, Refills: 0  
   
atorvastatin (LIPITOR) 40 mg tablet Take 1 Tab by mouth daily. Qty: 30 Tab, Refills: 0  
   
clopidogrel (PLAVIX) 75 mg tab Take 1 Tab by mouth daily. Qty: 30 Tab, Refills: 0  
   
ferrous sulfate 325 mg (65 mg iron) tablet Take 1 Tab by mouth two (2) times daily (with meals). Qty: 60 Tab, Refills: 0  
   
aspirin 81 mg chewable tablet Take 1 Tab by mouth daily. Qty: 30 Tab, Refills: 0  
   
polyethylene glycol (MIRALAX) 17 gram packet Take 1 Packet by mouth daily. Qty: 30 Packet, Refills: 0  
   
   
    
STOP taking these medications  
   
  fludrocortisone (FLORINEF) 0.1 mg tablet Comments:  
Reason for Stopping:   
     
  simvastatin (ZOCOR) 40 mg tablet Comments:  
Reason for Stopping:   
     
   
  
  
Admission imaging studies: 
  
No results found for this or any previous visit.      
  
No results found for this or any previous visit.        
  
    
Results from Hospital Encounter encounter on 05/15/19 CT CODE NEURO HEAD WO CONTRAST  
  Narrative EXAM: CT CODE NEURO HEAD WO CONTRAST Clinical history: Left-sided weakness INDICATION: Code S L side weakness 
  
 COMPARISON: 5/16/2019. 
  
CONTRAST: None. 
  
TECHNIQUE: Unenhanced CT of the head was performed using 5 mm images. Brain and 
bone windows were generated. CT dose reduction was achieved through use of a 
standardized protocol tailored for this examination and automatic exposure 
control for dose modulation.   
  
FINDINGS: 
Mild sulcal and ventricular prominence. . Minimal scattered hypodensities in the 
cerebral white matter. . There is no intracranial hemorrhage, extra-axial 
collection, mass, mass effect or midline shift. The basilar cisterns are open. No acute infarct is identified. The bone windows demonstrate no abnormalities. The visualized portions of the paranasal sinuses and mastoid air cells are 
clear. Vertebral vascular calcifications. 
   
  Impression IMPRESSION: No acute intracranial process 
  
   
       
                         
No procedure found. 
  
  
------------------------------------------------------------------------------------------------------------------- 
  
Chronic Diagnoses:   
          
Problem List as of 5/17/2019 Never Reviewed  
        Codes Class Noted - Resolved  
  * (Principal) TIA (transient ischemic attack) ICD-10-CM: G45.9 ICD-9-CM: 247. 9   5/15/2019 - Present  
     
  Dysphasia ICD-10-CM: R47.02 
ICD-9-CM: 784.59   5/15/2019 - Present  
     
  Headache ICD-10-CM: V98 ICD-9-CM: 980. 0   5/15/2019 - Present  
     
  Hypokalemia ICD-10-CM: E87.6 ICD-9-CM: 276.8   5/15/2019 - Present  
     
  Elevated serum creatinine ICD-10-CM: R79.89 ICD-9-CM: 790.99   5/15/2019 - Present  
     
  Stage 3 chronic kidney disease (HCC) ICD-10-CM: N18.3 ICD-9-CM: 416. 3   5/15/2019 - Present  
     
  Microcytic anemia ICD-10-CM: D50.9 ICD-9-CM: 280. 9   5/15/2019 - Present  
     
  Right facial numbness ICD-10-CM: R20.0 ICD-9-CM: 194. 0   5/15/2019 - Present  
     
  Abnormal urinalysis ICD-10-CM: R82.90 ICD-9-CM: 791.9   5/15/2019 - Present  
     
   UTI (urinary tract infection) ICD-10-CM: N39.0 ICD-9-CM: 599.0   5/15/2019 - Present  
     
  Hypertension ICD-10-CM: I10 
ICD-9-CM: 339. 9   5/15/2019 - Present  
     
  History of CVA (cerebrovascular accident) ICD-10-CM: Z86.73 
ICD-9-CM: V12.54   5/15/2019 - Present  
     
   
  
  
  
Signed:  
  
            Romeo Mcfadden MD 
            Family Medicine Resident             5/17/2019 
  
            Dr. Melissa Reddy MD 
            Family Medicine Attending

## 2019-05-17 NOTE — ROUTINE PROCESS
Bedside and Verbal shift change report given to Ashlee Grayson (oncoming nurse) by Raymond Basilio (offgoing nurse). Report included the following information SBAR, Kardex, Intake/Output, MAR, Accordion, Recent Results and Quality Measures.

## 2019-05-17 NOTE — ROUTINE PROCESS
Pt denies having any pain, sob, or distress at the  Moment. Rechecked H/H 8.1/26.6 and K+ 4.1 MD billingsley notified. safety precautions maintained and plan of care continues.

## 2019-05-17 NOTE — DISCHARGE INSTRUCTIONS
HOME DISCHARGE INSTRUCTIONS    Ricardo Fernando / [id number] : 1925    Admission date: 5/15/2019 Discharge date: 2019     Please bring this form with you to show your care provider at your follow-up appointment. Primary care provider:  Alexandro Reyes MD     Discharging provider:  Sonido Elias MD  - Family Medicine Resident  Codi Snow MD  - Attending, Family Medicine     You have been admitted to the hospital with the following diagnoses:    ACUTE DIAGNOSES:  · TIA (transient ischemic attack) [G45.9]  . . . . . . . . . . . . . . . . . . . . . . . . . . . . . . . . . . . . . . . . . . . . . . . . . . . . . . . . . . . . . . . . . . . . . . . . MEDICATIONS:  1. START FERROUS SULFATE (Iron) 325mg (1 TAB) TWO TIMES A DAY  2. START ASPIRIN 81mg (1 TAB) DAILY  3. START PLAVIX 75 mg (Clopidogrel) DAILY  4. START LIPITOR (Atorvastatin) 40mg (1 TAB) DAILY  5. START NORVASC (Amlodipine) 5mg (1TAB) DAILY   6. STOP TAKING FLORINEF  7. STOP TAKING SIMVASTATIN  8. START TAKING MIRALAX 17g DAILY AS NEEDED FOR CONSTIPATION. FOLLOW-UP CARE RECOMMENDATIONS:    Appointments  Follow-up Information     Follow up With Specialties Details Why Contact Info    Alexandro Reyes MD Family Practice On 2019 Please follow up with your PCP office on 2019 (Monday) at 1:00pm [address] [facility]  [phone]             Follow-up tests needed: CBC to monitor your blood count. Pending test results: At the time of your discharge the following test results are still pending: None   Please make sure you review these results with your outpatient follow-up provider(s). Specific symptoms to watch for: chest pain, shortness of breath, fever, chills, nausea, vomiting, diarrhea, change in mentation, falling, weakness, bleeding.      DIET/what to eat:  Regular Diet    ACTIVITY:  Activity as tolerated    Wound care: none    Equipment needed:  none    What to do if new or unexpected symptoms occur? If you experience any of the above symptoms (or should other concerns or questions arise after discharge) please call your primary care physician. Return to the emergency room if you cannot get hold of your doctor. · It is very important that you keep your follow-up appointment(s). · Please bring discharge papers, medication list (and/or medication bottles) to your follow-up appointments for review by your outpatient provider(s). · Please check the list of medications and be sure it includes every medication (even non-prescription medications) that your provider wants you to take. · It is important that you take the medication exactly as they are prescribed. · Keep your medication in the bottles provided by the pharmacist and keep a list of the medication names, dosages, and times to be taken in your wallet. · Do not take other medications without consulting your doctor. · If you have any questions about your medications or other instructions, please talk to your nurse or care provider before you leave the hospital.     Information obtained by:     I understand that if any problems occur once I am at home I am to contact my physician. These instructions were explained to me and I had the opportunity to ask questions. I understand and acknowledge receipt of the instructions indicated above.

## 2019-05-18 VITALS
BODY MASS INDEX: 21.33 KG/M2 | OXYGEN SATURATION: 100 % | WEIGHT: 128 LBS | HEART RATE: 70 BPM | SYSTOLIC BLOOD PRESSURE: 185 MMHG | RESPIRATION RATE: 18 BRPM | TEMPERATURE: 98.1 F | HEIGHT: 65 IN | DIASTOLIC BLOOD PRESSURE: 73 MMHG

## 2019-05-18 LAB
BASOPHILS # BLD: 0.1 K/UL (ref 0–0.1)
BASOPHILS NFR BLD: 1 % (ref 0–1)
DIFFERENTIAL METHOD BLD: ABNORMAL
EOSINOPHIL # BLD: 0.2 K/UL (ref 0–0.4)
EOSINOPHIL NFR BLD: 4 % (ref 0–7)
ERYTHROCYTE [DISTWIDTH] IN BLOOD BY AUTOMATED COUNT: 15.7 % (ref 11.5–14.5)
HCT VFR BLD AUTO: 27.6 % (ref 35–47)
HGB BLD-MCNC: 8.4 G/DL (ref 11.5–16)
IMM GRANULOCYTES # BLD AUTO: 0 K/UL (ref 0–0.04)
IMM GRANULOCYTES NFR BLD AUTO: 0 % (ref 0–0.5)
LYMPHOCYTES # BLD: 2.3 K/UL (ref 0.8–3.5)
LYMPHOCYTES NFR BLD: 39 % (ref 12–49)
MCH RBC QN AUTO: 23.6 PG (ref 26–34)
MCHC RBC AUTO-ENTMCNC: 30.4 G/DL (ref 30–36.5)
MCV RBC AUTO: 77.5 FL (ref 80–99)
MONOCYTES # BLD: 0.6 K/UL (ref 0–1)
MONOCYTES NFR BLD: 9 % (ref 5–13)
NEUTS SEG # BLD: 2.7 K/UL (ref 1.8–8)
NEUTS SEG NFR BLD: 47 % (ref 32–75)
NRBC # BLD: 0 K/UL (ref 0–0.01)
NRBC BLD-RTO: 0 PER 100 WBC
PLATELET # BLD AUTO: 266 K/UL (ref 150–400)
PMV BLD AUTO: 9.8 FL (ref 8.9–12.9)
RBC # BLD AUTO: 3.56 M/UL (ref 3.8–5.2)
WBC # BLD AUTO: 5.9 K/UL (ref 3.6–11)

## 2019-05-18 PROCEDURE — 85025 COMPLETE CBC W/AUTO DIFF WBC: CPT

## 2019-05-18 PROCEDURE — 74011250637 HC RX REV CODE- 250/637: Performed by: NURSE PRACTITIONER

## 2019-05-18 PROCEDURE — 74011250637 HC RX REV CODE- 250/637: Performed by: FAMILY MEDICINE

## 2019-05-18 PROCEDURE — 36415 COLL VENOUS BLD VENIPUNCTURE: CPT

## 2019-05-18 PROCEDURE — 74011250636 HC RX REV CODE- 250/636: Performed by: INTERNAL MEDICINE

## 2019-05-18 RX ORDER — CLOPIDOGREL BISULFATE 75 MG/1
75 TABLET ORAL DAILY
Status: DISCONTINUED | OUTPATIENT
Start: 2019-05-18 | End: 2019-05-18 | Stop reason: HOSPADM

## 2019-05-18 RX ADMIN — ASPIRIN 81 MG 81 MG: 81 TABLET ORAL at 08:54

## 2019-05-18 RX ADMIN — Medication 10 ML: at 06:00

## 2019-05-18 RX ADMIN — AMLODIPINE BESYLATE 5 MG: 5 TABLET ORAL at 08:54

## 2019-05-18 RX ADMIN — CLOPIDOGREL BISULFATE 75 MG: 75 TABLET ORAL at 08:54

## 2019-05-18 RX ADMIN — FERROUS SULFATE TAB 325 MG (65 MG ELEMENTAL FE) 325 MG: 325 (65 FE) TAB at 08:54

## 2019-05-18 RX ADMIN — HEPARIN SODIUM 5000 UNITS: 5000 INJECTION INTRAVENOUS; SUBCUTANEOUS at 05:53

## 2019-05-18 NOTE — ROUTINE PROCESS
Bedside and Verbal shift change report given to Javad (oncoming nurse) by Mukund Anderson (offgoing nurse). Report included the following information SBAR, Kardex, Procedure Summary, Intake/Output, MAR, Accordion and Quality Measures.

## 2019-05-18 NOTE — PROGRESS NOTES
5/18/2019 
9:59 AM 
 
CM noted consult to confirm [facility] PT/OT. [address] [facility] [phone] and spoke with [facility]) who confirmed they will arrange for pt to be seen on Sunday. No further needs noted at this time. Nancy Spurling, 
Care Management

## 2019-05-20 ENCOUNTER — HOSPITAL ENCOUNTER (EMERGENCY)
Age: 84
Discharge: HOME | End: 2019-05-20
Attending: EMERGENCY MEDICINE
Payer: MEDICARE

## 2019-05-20 ENCOUNTER — APPOINTMENT (OUTPATIENT)
Dept: CT IMAGING | Age: 84
End: 2019-05-20
Attending: EMERGENCY MEDICINE
Payer: MEDICARE

## 2019-05-20 ENCOUNTER — PATIENT OUTREACH (OUTPATIENT)
Dept: FAMILY MEDICINE CLINIC | Age: 84
End: 2019-05-20

## 2019-05-20 VITALS
DIASTOLIC BLOOD PRESSURE: 63 MMHG | BODY MASS INDEX: 21.33 KG/M2 | SYSTOLIC BLOOD PRESSURE: 167 MMHG | HEART RATE: 77 BPM | RESPIRATION RATE: 13 BRPM | WEIGHT: 128 LBS | TEMPERATURE: 99.9 F | HEIGHT: 65 IN | OXYGEN SATURATION: 98 %

## 2019-05-20 DIAGNOSIS — N39.0 URINARY TRACT INFECTION WITHOUT HEMATURIA, SITE UNSPECIFIED: ICD-10-CM

## 2019-05-20 DIAGNOSIS — G45.9 TIA (TRANSIENT ISCHEMIC ATTACK): Primary | ICD-10-CM

## 2019-05-20 DIAGNOSIS — R51.9 ACUTE NONINTRACTABLE HEADACHE, UNSPECIFIED HEADACHE TYPE: ICD-10-CM

## 2019-05-20 LAB
ANION GAP SERPL CALC-SCNC: 7 MMOL/L (ref 5–15)
APPEARANCE UR: ABNORMAL
BACTERIA URNS QL MICRO: ABNORMAL /HPF
BASOPHILS # BLD: 0.1 K/UL (ref 0–0.1)
BASOPHILS NFR BLD: 1 % (ref 0–1)
BILIRUB UR QL: NEGATIVE
BUN SERPL-MCNC: 16 MG/DL (ref 6–20)
BUN/CREAT SERPL: 9 (ref 12–20)
CALCIUM SERPL-MCNC: 9.1 MG/DL (ref 8.5–10.1)
CHLORIDE SERPL-SCNC: 110 MMOL/L (ref 97–108)
CO2 SERPL-SCNC: 26 MMOL/L (ref 21–32)
COLOR UR: ABNORMAL
CREAT SERPL-MCNC: 1.86 MG/DL (ref 0.55–1.02)
DIFFERENTIAL METHOD BLD: ABNORMAL
EOSINOPHIL # BLD: 0.1 K/UL (ref 0–0.4)
EOSINOPHIL NFR BLD: 1 % (ref 0–7)
EPITH CASTS URNS QL MICRO: ABNORMAL /LPF
ERYTHROCYTE [DISTWIDTH] IN BLOOD BY AUTOMATED COUNT: 16 % (ref 11.5–14.5)
GLUCOSE BLD STRIP.AUTO-MCNC: 121 MG/DL (ref 65–100)
GLUCOSE SERPL-MCNC: 119 MG/DL (ref 65–100)
GLUCOSE UR STRIP.AUTO-MCNC: NEGATIVE MG/DL
HCT VFR BLD AUTO: 29 % (ref 35–47)
HGB BLD-MCNC: 8.8 G/DL (ref 11.5–16)
HGB UR QL STRIP: NEGATIVE
HYALINE CASTS URNS QL MICRO: ABNORMAL /LPF (ref 0–5)
IMM GRANULOCYTES # BLD AUTO: 0 K/UL (ref 0–0.04)
IMM GRANULOCYTES NFR BLD AUTO: 0 % (ref 0–0.5)
INR PPP: 1 (ref 0.9–1.1)
KETONES UR QL STRIP.AUTO: NEGATIVE MG/DL
LEUKOCYTE ESTERASE UR QL STRIP.AUTO: ABNORMAL
LYMPHOCYTES # BLD: 1.5 K/UL (ref 0.8–3.5)
LYMPHOCYTES NFR BLD: 22 % (ref 12–49)
MCH RBC QN AUTO: 24 PG (ref 26–34)
MCHC RBC AUTO-ENTMCNC: 30.3 G/DL (ref 30–36.5)
MCV RBC AUTO: 79.2 FL (ref 80–99)
MONOCYTES # BLD: 0.6 K/UL (ref 0–1)
MONOCYTES NFR BLD: 8 % (ref 5–13)
NEUTS SEG # BLD: 4.7 K/UL (ref 1.8–8)
NEUTS SEG NFR BLD: 68 % (ref 32–75)
NITRITE UR QL STRIP.AUTO: NEGATIVE
NRBC # BLD: 0 K/UL (ref 0–0.01)
NRBC BLD-RTO: 0 PER 100 WBC
PH UR STRIP: 7.5 [PH] (ref 5–8)
PLATELET # BLD AUTO: 284 K/UL (ref 150–400)
PMV BLD AUTO: 10 FL (ref 8.9–12.9)
POTASSIUM SERPL-SCNC: 4.2 MMOL/L (ref 3.5–5.1)
PROT UR STRIP-MCNC: 30 MG/DL
PROTHROMBIN TIME: 10.3 SEC (ref 9–11.1)
RBC # BLD AUTO: 3.66 M/UL (ref 3.8–5.2)
RBC #/AREA URNS HPF: ABNORMAL /HPF (ref 0–5)
SERVICE CMNT-IMP: ABNORMAL
SODIUM SERPL-SCNC: 143 MMOL/L (ref 136–145)
SP GR UR REFRACTOMETRY: 1.01 (ref 1–1.03)
UROBILINOGEN UR QL STRIP.AUTO: 0.2 EU/DL (ref 0.2–1)
WBC # BLD AUTO: 6.9 K/UL (ref 3.6–11)
WBC URNS QL MICRO: ABNORMAL /HPF (ref 0–4)

## 2019-05-20 PROCEDURE — 74011250637 HC RX REV CODE- 250/637: Performed by: NURSE PRACTITIONER

## 2019-05-20 PROCEDURE — 36415 COLL VENOUS BLD VENIPUNCTURE: CPT

## 2019-05-20 PROCEDURE — 80048 BASIC METABOLIC PNL TOTAL CA: CPT

## 2019-05-20 PROCEDURE — 82962 GLUCOSE BLOOD TEST: CPT

## 2019-05-20 PROCEDURE — 85610 PROTHROMBIN TIME: CPT

## 2019-05-20 PROCEDURE — 93005 ELECTROCARDIOGRAM TRACING: CPT

## 2019-05-20 PROCEDURE — 70450 CT HEAD/BRAIN W/O DYE: CPT

## 2019-05-20 PROCEDURE — 85025 COMPLETE CBC W/AUTO DIFF WBC: CPT

## 2019-05-20 PROCEDURE — 81001 URINALYSIS AUTO W/SCOPE: CPT

## 2019-05-20 PROCEDURE — 99285 EMERGENCY DEPT VISIT HI MDM: CPT

## 2019-05-20 RX ORDER — CEPHALEXIN 500 MG/1
500 CAPSULE ORAL 4 TIMES DAILY
Qty: 28 CAP | Refills: 0 | Status: SHIPPED | OUTPATIENT
Start: 2019-05-20 | End: 2019-05-27

## 2019-05-20 RX ORDER — ACETAMINOPHEN 325 MG/1
650 TABLET ORAL
Status: COMPLETED | OUTPATIENT
Start: 2019-05-20 | End: 2019-05-20

## 2019-05-20 RX ORDER — KETOROLAC TROMETHAMINE 30 MG/ML
30 INJECTION, SOLUTION INTRAMUSCULAR; INTRAVENOUS
Status: DISCONTINUED | OUTPATIENT
Start: 2019-05-20 | End: 2019-05-20

## 2019-05-20 RX ADMIN — ACETAMINOPHEN 650 MG: 325 TABLET ORAL at 14:15

## 2019-05-20 NOTE — DISCHARGE INSTRUCTIONS
Patient Education        Transient Ischemic Attack: Care Instructions  Your Care Instructions    A transient ischemic attack (TIA) is when blood flow to a part of your brain is blocked for a short time. A TIA is like a stroke but usually lasts only a few minutes. A TIA does not cause lasting brain damage. Any vision problems, slurred speech, or other symptoms usually go away in 10 to 20 minutes. But they may last for up to 24 hours. TIAs are often warning signs of a stroke. Some people who have a TIA may have a stroke in the future. A stroke can cause symptoms like those of a TIA. But a stroke causes lasting damage to your brain. You can take steps to help prevent a stroke. One thing you can do is get early treatment. If you have other new symptoms, or if your symptoms do not get better, go back to the emergency room or call your doctor right away. Getting treatment right away may prevent long-term brain damage caused by a stroke. The doctor has checked you carefully, but problems can develop later. If you notice any problems or new symptoms, get medical treatment right away. Follow-up care is a key part of your treatment and safety. Be sure to make and go to all appointments, and call your doctor if you are having problems. It's also a good idea to know your test results and keep a list of the medicines you take. How can you care for yourself at home? Medicines    · Be safe with medicines. Take your medicines exactly as prescribed. Call your doctor if you think you are having a problem with your medicine.     · If you take a blood thinner, such as aspirin, be sure you get instructions about how to take your medicine safely.  Blood thinners can cause serious bleeding problems.     · Call your doctor if you are not able to take your medicines for any reason.     · Do not take any over-the-counter medicines or herbal products without talking to your doctor first.     · If you take birth control pills or hormone therapy, talk to your doctor. Ask if these treatments are right for you.    Lifestyle changes    · Do not smoke. If you need help quitting, talk to your doctor about stop-smoking programs and medicines.     · Be active. If your doctor recommends it, get more exercise. Walking is a good choice. Bit by bit, increase the amount you walk every day. Try for at least 30 minutes on most days of the week. You also may want to swim, bike, or do other activities.     · Eat heart-healthy foods. These include fruits, vegetables, high-fiber foods, fish, and foods that are low in sodium, saturated fat, and trans fat.     · Stay at a healthy weight. Lose weight if you need to.     · Limit alcohol to 2 drinks a day for men and 1 drink a day for women.    Staying healthy    · Manage other health problems such as diabetes, high blood pressure, and high cholesterol.     · Get the flu vaccine every year. When should you call for help? Call 911 anytime you think you may need emergency care. For example, call if:    · You have new or worse symptoms of a stroke. These may include:  ? Sudden numbness, tingling, weakness, or loss of movement in your face, arm, or leg, especially on only one side of your body. ? Sudden vision changes. ? Sudden trouble speaking. ? Sudden confusion or trouble understanding simple statements. ? Sudden problems with walking or balance. ? A sudden, severe headache that is different from past headaches. Call 911 even if these symptoms go away in a few minutes.     · You feel like you are having another TIA.    Watch closely for changes in your health, and be sure to contact your doctor if you have any problems. Where can you learn more? Go to http://nelly-mellissa.info/. Enter ([phone] in the search box to learn more about \"Transient Ischemic Attack: Care Instructions. \"  Current as of: September 26, 2018  Content Version: 11.9  © 5603-0049 [employer].  Care instructions adapted under license by [employer] (which disclaims liability or warranty for this information). If you have questions about a medical condition or this instruction, always ask your healthcare professional. [address] any warranty or liability for your use of this information. I will START or STAY ON the medications listed below when I get home from the hospital:    lansoprazole 30 mg oral delayed release capsule  -- 1 cap(s) by mouth once a day  -- Indication: For home

## 2019-05-20 NOTE — ED PROVIDER NOTES
I have evaluated the patient as the Provider in Triage. I have reviewed Her vital signs and the triage nurse assessment. I have talked with the patient and any available family and advised that I am the provider in triage and have ordered the appropriate study to initiate their work up based on the clinical presentation during my assessment. I have advised that the patient will be accommodated in the Main ED as soon as possible. I have also requested to contact the triage nurse or myself immediately if the patient experiences any changes in their condition during this brief waiting period. Per relative, patient's LKW was \"awhile ago. \" Patient states at 0800 this morning she had gradual onset of left sided weakness that lasted an unknown amount of time. Patient's relative arrived to her home sometime after the onset of symptoms and noticed she had accompanying confusion. Patient presents to [facility] ED today with frontal headache that onset last night that she initially rated as 8/10 in severity and then rated as \"mild\", as well as persisting confusion. Per relative, the patient is not at baseline mentation and has not been for \"awhile. \" Patient ambulates without assistance at baseline. Patient denies taking blood thinners or anticoagulation therapy. Note written by Chandni Gray, as dictated by Ginny Williamson MD 11:34 AM 
 
 
The history is provided by the patient and a relative. Past Medical History:  
Diagnosis Date  Breast cancer ([facility]  Cancer [facility])   
 left breast  
 Cataract  Chronic renal failure  Colonic polyp  Dementia  Diabetes ([facility]  Eczema  Headache  Hyperlipemia  Hypothyroidism  Ill-defined condition   
 high cholesterol, hypothyroid  Osteopenia  Posterior vitreous detachment, left  Type 2 diabetes mellitus ([facility]  UTI (urinary tract infection)  Vitamin D deficiency Past Surgical History: Procedure Laterality Date  BREAST SURGERY PROCEDURE UNLISTED    
 left breast lumpectomy  HX APPENDECTOMY  HX CATARACT REMOVAL    
 HX COLONOSCOPY  2009  HX GYN    
 hysterectomy [id number] Left breast ,edial segmental mastectomy- 1992;  
 HX ORTHOPAEDIC    
 back Family History:  
Problem Relation Age of Onset  No Known Problems Mother  No Known Problems Father Social History Socioeconomic History  Marital status: UNKNOWN Spouse name: Not on file  Number of children: Not on file  Years of education: Not on file  Highest education level: Not on file Occupational History  Not on file Social Needs  Financial resource strain: Not on file  Food insecurity:  
  Worry: Not on file Inability: Not on file  Transportation needs:  
  Medical: Not on file Non-medical: Not on file Tobacco Use  Smoking status: Never Smoker  Smokeless tobacco: Never Used Substance and Sexual Activity  Alcohol use: No  
 Drug use: No  
 Sexual activity: Never Lifestyle  Physical activity:  
  Days per week: Not on file Minutes per session: Not on file  Stress: Not on file Relationships  Social connections:  
  Talks on phone: Not on file Gets together: Not on file Attends Mandaeism service: Not on file Active member of club or organization: Not on file Attends meetings of clubs or organizations: Not on file Relationship status: Not on file  Intimate partner violence:  
  Fear of current or ex partner: Not on file Emotionally abused: Not on file Physically abused: Not on file Forced sexual activity: Not on file Other Topics Concern  Not on file Social History Narrative ** Merged History Encounter ** ALLERGIES: Patient has no known allergies. Review of Systems Constitutional: Negative for chills and fever. HENT: Negative for drooling, ear pain, facial swelling, hearing loss, tinnitus and trouble swallowing. Eyes: Negative for photophobia and visual disturbance. Respiratory: Negative for cough, chest tightness and shortness of breath. Cardiovascular: Negative for chest pain, palpitations and leg swelling. Gastrointestinal: Negative for abdominal distention. Musculoskeletal: Negative. Skin: Negative. Neurological: Positive for headaches. Negative for dizziness, tremors, syncope, facial asymmetry, speech difficulty and numbness. Psychiatric/Behavioral: Negative. There were no vitals filed for this visit. Physical Exam  
Constitutional: She is oriented to person, place, and time. She appears well-developed and well-nourished. HENT:  
Head: Normocephalic and atraumatic. Right Ear: External ear normal.  
Left Ear: External ear normal.  
Nose: Nose normal.  
Mouth/Throat: Oropharynx is clear and moist. No oropharyngeal exudate. Eyes: Pupils are equal, round, and reactive to light. Conjunctivae and EOM are normal.  
Fundoscopic exam: The right eye shows no AV nicking, no hemorrhage and no papilledema. The left eye shows no AV nicking, no hemorrhage and no papilledema. Neck: Normal range of motion. Neck supple. No JVD present. Cardiovascular: Normal rate, regular rhythm, normal heart sounds and intact distal pulses. Pulmonary/Chest: Effort normal and breath sounds normal. She has no wheezes. Abdominal: Soft. Bowel sounds are normal.  
Musculoskeletal: Normal range of motion. She exhibits no edema, tenderness or deformity. +5/5 x 4 Lymphadenopathy:  
  She has no cervical adenopathy. Neurological: She is alert and oriented to person, place, and time. She has normal strength. She is not disoriented. No cranial nerve deficit or sensory deficit. She exhibits normal muscle tone. GCS eye subscore is 4. GCS verbal subscore is 5. GCS motor subscore is 6. Negative pronator drift. Nursing note and vitals reviewed. MDM Number of Diagnoses or Management Options Acute nonintractable headache, unspecified headache type:  
TIA (transient ischemic attack): Urinary tract infection without hematuria, site unspecified:  
Diagnosis management comments:  
 
Acute headache, confusion and weakness. I see no focal neurological deficits, nor confusion. She has symmetric motor function and no weakness. She was complaining with a headache, but resolved with APAP. Her renal function was elevated, but reviewing previous labs-- appears normal for her. She has a moderate cystitis, will cover with antibiotic and follow culture. It's plausible she may have had a TIA. Her CT was normal. I discussed the findings with the patient and advised her to follow up with her PCP. Procedures

## 2019-05-20 NOTE — ED NOTES
D/c instructions and information discussed with pt. Pt verbalized understanding. Pt to wait for family member to arrive to drive her home.

## 2019-05-20 NOTE — ED TRIAGE NOTES
Pt presents to ED with gradual onset left sided weakness and feeling unbalanced around 0830 this am. Pt states weakness has resolved, now with frontal headache, and still unsteady on feet. FARZANA -.

## 2019-05-20 NOTE — ED NOTES
S/w emergency contact Liane Khan. She is aware that pt is being dc'd and will need a ride back home. Mrs José Manuel Damon will contact pt's ride ([email or web address]) to have him drive her home in approx 45 min. This info was relayed to pt.

## 2019-05-20 NOTE — PROGRESS NOTES
Hospital Discharge Follow-Up      Date/Time:  2019 9:49 AM    Patient was admitted to [facility] on 5/15/19 and discharged on  for TIA. The physician discharge summary was available at the time of outreach. Patient was contacted within 1 business days of discharge. Top Challenges reviewed with the provider   Pt lives on her own         Method of communication with provider: none    Inpatient RRAT score: 25  Was this a readmission? no   Patient stated reason for the readmission: N/A    Nurse Navigator (NN) contacted the patient by telephone to perform post hospital discharge assessment. Verified name and  with patient as identifiers. Provided introduction to self, and explanation of the Nurse Navigator role. Reviewed discharge instructions and red flags with patient who verbalized understanding. Patient given an opportunity to ask questions and does not have any further questions or concerns at this time. The patient agrees to contact the PCP office for questions related to their healthcare. NN provided contact information for future reference. Disease Specific:   N/A    Summary of patient's top problems:  1. TIA: Pt back to baseline. No focal neurological deficits. CT head: No acute intracranial abnormality. MRI brain: No acute intracranial abnormality but did show loss of the normal left V3 vertebral artery flow void which is suspicious for stenosis or occlusion but MRA negative. carotid dopplers negative. ECHO with EF of 61-65% with no shunting. Neurology consulted and will start asa and statin therapy. Permissive HTN for the first 24 hours. Pt had repeat stroke sxs on day of discharge likely 2/2 drop in BP with repeat CT head that was negative, telenuro added plavix and noted safe for discharge.  Pt noted that she would not want any invasive procedures at this point in her life   - start asa 81mg daily  - Start [id number] daily   - Stop Zofor and start Lipitor 40mg daily   - Held Florinef during admission and discharge due to HTN follow up oupt    - PT rec rehab pt refused will go home with HH    - Will start Norvasc 5mg daily and will need close outpt follow up     2. UTI: UA concerning for UTI s/p CTX in ED   -  urine cx negative     3. Iron deficiency Anemia: POA. Hgb 9.8 on admission and downtrended to Hgb 8.1 on Hospital day 2. Downtrend likely dilutional. Pt reports that she takes Ferrous sulfate 325 daily. Retoc count wnl. Follow up Ferritin, Iron profile, Haptoglobin, LD    - Started Iron BID      Home Health orders at discharge: PT, [facility]  Date of initial visit: 5/19/19    Durable Medical Equipment ordered/company: none  Durable Medical Equipment received: none    Barriers to care? support system, utilization of services    Advance Care Planning:   Does patient have an Advance Directive:  not on file     Medication(s):   New Medications at Discharge: norvasc, asprin, lipitor, plavix, iron, miralax     Discontinued Medications at Discharge: florinef, zocor    Medication reconciliation was  not completed, who verbalizes understanding of administration of home medications. There were no barriers to obtaining medications identified at this time. Referral to Pharm D needed: no     Current Outpatient Medications   Medication Sig    amLODIPine (NORVASC) 5 mg tablet Take 1 Tab by mouth daily.  atorvastatin (LIPITOR) 40 mg tablet Take 1 Tab by mouth daily.  clopidogrel (PLAVIX) 75 mg tab Take 1 Tab by mouth daily.  ferrous sulfate 325 mg (65 mg iron) tablet Take 1 Tab by mouth two (2) times daily (with meals).  aspirin 81 mg chewable tablet Take 1 Tab by mouth daily.  polyethylene glycol (MIRALAX) 17 gram packet Take 1 Packet by mouth daily. No current facility-administered medications for this visit. There are no discontinued medications. [facility] follow up appointment(s): No future appointments.    Non-[facility] follow up appointment(s): none  [facility]:  n/a       Goals     None

## 2019-05-20 NOTE — ED NOTES
Was informed that Mrs. Jovi Duarte will be coming to ED to transport pt home. Pt also informed that her antibiotics were called to her pharmacy.

## 2019-05-21 LAB
ATRIAL RATE: 227 BPM
CALCULATED P AXIS, ECG09: 39 DEGREES
CALCULATED R AXIS, ECG10: 21 DEGREES
CALCULATED T AXIS, ECG11: 63 DEGREES
DIAGNOSIS, 93000: NORMAL
Q-T INTERVAL, ECG07: 388 MS
QRS DURATION, ECG06: 68 MS
QTC CALCULATION (BEZET), ECG08: 424 MS
VENTRICULAR RATE, ECG03: 72 BPM

## 2019-05-29 ENCOUNTER — TELEPHONE (OUTPATIENT)
Dept: FAMILY MEDICINE CLINIC | Age: 84
End: 2019-05-29

## 2019-05-29 NOTE — TELEPHONE ENCOUNTER
[facility] called stating pt discharged from hospital on 5/18 and seen again on 5/20 with a change in medications. Stating she needs her medication reconciled. She has 2 discharge orders from [facility]. Having weakness and unable to ambulate for long distances which is not her norm. She stated she wants to make sure her medication is gone over and any changes faxed over to her home health.      Fax number: [phone]

## 2019-05-29 NOTE — TELEPHONE ENCOUNTER
Contacted pt to verify she knows she is seeing NP Roger. Pt was confused and did not seem to understand that the appt wasn't made with Dr Herson French. Several attempts were made to clarify if pt would like to keep appt or reschedule but pt was unclear and continued to question who she is seeing. Pt then hung up without clarifying.  I believe pt still intends to keep appt with NP Roger for tomorrow

## 2019-05-29 NOTE — TELEPHONE ENCOUNTER
She has an appt with you tomorrow for a physical, home health just looking to get any med changes faxed over

## 2019-05-30 ENCOUNTER — OFFICE VISIT (OUTPATIENT)
Dept: FAMILY MEDICINE CLINIC | Age: 84
End: 2019-05-30

## 2019-05-30 VITALS
BODY MASS INDEX: 20.99 KG/M2 | HEART RATE: 69 BPM | DIASTOLIC BLOOD PRESSURE: 65 MMHG | TEMPERATURE: 97.6 F | WEIGHT: 126 LBS | SYSTOLIC BLOOD PRESSURE: 137 MMHG | HEIGHT: 65 IN | OXYGEN SATURATION: 100 % | RESPIRATION RATE: 16 BRPM

## 2019-05-30 DIAGNOSIS — G44.89 OTHER HEADACHE SYNDROME: ICD-10-CM

## 2019-05-30 DIAGNOSIS — D64.9 ANEMIA, UNSPECIFIED TYPE: ICD-10-CM

## 2019-05-30 DIAGNOSIS — G45.9 TIA (TRANSIENT ISCHEMIC ATTACK): Primary | ICD-10-CM

## 2019-05-30 DIAGNOSIS — I10 ESSENTIAL HYPERTENSION: ICD-10-CM

## 2019-05-30 DIAGNOSIS — N30.00 ACUTE CYSTITIS WITHOUT HEMATURIA: ICD-10-CM

## 2019-05-30 DIAGNOSIS — E03.9 ACQUIRED HYPOTHYROIDISM: ICD-10-CM

## 2019-05-30 RX ORDER — ATORVASTATIN CALCIUM 40 MG/1
40 TABLET, FILM COATED ORAL DAILY
Qty: 90 TAB | Refills: 1 | Status: SHIPPED | OUTPATIENT
Start: 2019-05-30 | End: 2019-09-30 | Stop reason: SDUPTHER

## 2019-05-30 RX ORDER — LEVOTHYROXINE SODIUM 50 UG/1
TABLET ORAL
Qty: 90 TAB | Refills: 1 | Status: SHIPPED | OUTPATIENT
Start: 2019-05-30 | End: 2019-09-06 | Stop reason: SDUPTHER

## 2019-05-30 RX ORDER — AMLODIPINE BESYLATE 2.5 MG/1
2.5 TABLET ORAL DAILY
Qty: 90 TAB | Refills: 1 | Status: SHIPPED | OUTPATIENT
Start: 2019-05-30 | End: 2019-06-12

## 2019-05-30 RX ORDER — LANOLIN ALCOHOL/MO/W.PET/CERES
325 CREAM (GRAM) TOPICAL 2 TIMES DAILY WITH MEALS
Qty: 60 TAB | Refills: 2 | Status: SHIPPED | OUTPATIENT
Start: 2019-05-30 | End: 2019-08-20 | Stop reason: SDUPTHER

## 2019-05-30 RX ORDER — CEPHALEXIN 500 MG/1
500 CAPSULE ORAL 2 TIMES DAILY
Qty: 14 CAP | Refills: 0 | Status: SHIPPED | OUTPATIENT
Start: 2019-05-30 | End: 2019-06-06

## 2019-05-30 RX ORDER — CLOPIDOGREL BISULFATE 75 MG/1
75 TABLET ORAL DAILY
Qty: 90 TAB | Refills: 1 | Status: SHIPPED | OUTPATIENT
Start: 2019-05-30 | End: 2019-09-30 | Stop reason: SDUPTHER

## 2019-05-30 NOTE — PROGRESS NOTES
Assessment/Plan:     Diagnoses and all orders for this visit:    1. TIA (transient ischemic attack)  -     clopidogrel (PLAVIX) 75 mg tab; Take 1 Tab by mouth daily. -     atorvastatin (LIPITOR) 40 mg tablet; Take 1 Tab by mouth daily.  - Stable, stop aspirin, continue plavix. And continue Lipitor. Reasons to go to ED discussed    2. Acute cystitis without hematuria  -     cephALEXin (KEFLEX) 500 mg capsule; Take 1 Cap by mouth two (2) times a day for 7 days. - Unchanged, start keflex. Follow up in 10 days for UA    3. Essential hypertension  -     amLODIPine (NORVASC) 2.5 mg tablet; Take 1 Tab by mouth daily.  - Improved, decrease amlodipine to 2.5mg daily and monitor BP. 4. Acquired hypothyroidism  -     levothyroxine (SYNTHROID) 50 mcg tablet; TAKE 1 TABLET BY MOUTH ONCE DAILY   -presumed stable, will draw labs at next visit    5. Anemia, unspecified type  -     ferrous sulfate 325 mg (65 mg iron) tablet; Take 1 Tab by mouth two (2) times daily (with meals). -presumed stable, will draw labs at next visit    6. Other headache syndrome   -stable, may take tylenol as discussed. Follow-up and Dispositions    · Return in about 10 days (around 6/9/2019) for Follow Up. Discussed expected course/resolution/complications of diagnosis in detail with patient.    Medication risks/benefits/costs/interactions/alternatives discussed with patient.    Pt was given after visit summary which includes diagnoses, current medications & vitals. Pt expressed understanding with the diagnosis and plan        Subjective:      Johnny Shell is a 80 y.o. female who presents for had concerns including Hospital Follow Up ([address]). Here today for hospital follow up. Was in the ED 5/15/19 for TIA, discharged on 5/18/19. Went back on 5/20/19, discharged. Home health discovered discrepancies in the current medications vs the discharge medications. Patient states she has stopped all medications until there was reconciliation. Blood pressure is the biggest concern. connfusion over stopping amlodipine 5mg   and fludrocortisone. BP log shows elevated BP's and amlodipine was stopped 2 days ago because of not knowing what to take. BP last days off amlodipine was stable. 120-130's/80's. Denies CP, SOB, palpitations or leg swelling. Of note found in discharge note was that patient was diagnosed with a UTI and started on keflex for discharge. Patient said she never got an script or was not at her pharmacy for keflex. Current Outpatient Medications   Medication Sig Dispense Refill    cephALEXin (KEFLEX) 500 mg capsule Take 1 Cap by mouth two (2) times a day for 7 days. 14 Cap 0    levothyroxine (SYNTHROID) 50 mcg tablet TAKE 1 TABLET BY MOUTH ONCE DAILY 90 Tab 1    ferrous sulfate 325 mg (65 mg iron) tablet Take 1 Tab by mouth two (2) times daily (with meals). 60 Tab 2    clopidogrel (PLAVIX) 75 mg tab Take 1 Tab by mouth daily. 90 Tab 1    atorvastatin (LIPITOR) 40 mg tablet Take 1 Tab by mouth daily. 90 Tab 1    amLODIPine (NORVASC) 2.5 mg tablet Take 1 Tab by mouth daily. 90 Tab 1    aspirin 81 mg chewable tablet Take 1 Tab by mouth daily. 30 Tab 0    polyethylene glycol (MIRALAX) 17 gram packet Take 1 Packet by mouth daily. 30 Packet 0    varicella-zoster recombinant, PF, (SHINGRIX, PF,) 50 mcg/0.5 mL susr injection 0.5mL by IntraMUSCular route once now and then repeat in 2-6 months 0.5 mL 1    cholecalciferol, vitamin D3, (VITAMIN D3 PO) Take  by mouth.  glucose blood VI test strips (BLOOD GLUCOSE TEST) strip by Does Not Apply route See Admin Instructions.  butalbital-acetaminophen-caff (FIORICET) -40 mg per capsule Take 1 Cap by mouth every six (6) hours as needed for Pain. Max Daily Amount: 4 Caps.  20 Cap 0       No Known Allergies    ROS:   Review of Systems   Constitutional: Negative for fever and malaise/fatigue. Respiratory: Negative for cough and shortness of breath. Cardiovascular: Negative for chest pain, palpitations and leg swelling. Neurological: Negative for dizziness, focal weakness, weakness and headaches. Psychiatric/Behavioral: Negative for depression. Objective:     Visit Vitals  /65   Pulse 69   Temp 97.6 °F (36.4 °C) (Oral)   Resp 16   Ht 5' 5\" (1.651 m)   Wt 126 lb (57.2 kg)   LMP  (LMP Unknown)   SpO2 100%   BMI 20.97 kg/m²       Vitals and Nurse Documentation reviewed. Physical Exam   Constitutional: She is well-developed, well-nourished, and in no distress. No distress. HENT:   Head: Normocephalic and atraumatic. Cardiovascular: Normal rate, regular rhythm and normal heart sounds. Exam reveals no gallop and no friction rub. No murmur heard. Pulmonary/Chest: Effort normal and breath sounds normal. No respiratory distress. She has no wheezes. She has no rales. Neurological: She is alert. Skin: She is not diaphoretic. Psychiatric: Affect normal.       Results for orders placed or performed during the hospital encounter of 05/20/19   CBC WITH AUTOMATED DIFF   Result Value Ref Range    WBC 6.9 3.6 - 11.0 K/uL    RBC 3.66 (L) 3.80 - 5.20 M/uL    HGB 8.8 (L) 11.5 - 16.0 g/dL    HCT 29.0 (L) 35.0 - 47.0 %    MCV 79.2 (L) 80.0 - 99.0 FL    MCH 24.0 (L) 26.0 - 34.0 PG    MCHC 30.3 30.0 - 36.5 g/dL    RDW 16.0 (H) 11.5 - 14.5 %    PLATELET 787 049 - 645 K/uL    MPV 10.0 8.9 - 12.9 FL    NRBC 0.0 0  WBC    ABSOLUTE NRBC 0.00 0.00 - 0.01 K/uL    NEUTROPHILS 68 32 - 75 %    LYMPHOCYTES 22 12 - 49 %    MONOCYTES 8 5 - 13 %    EOSINOPHILS 1 0 - 7 %    BASOPHILS 1 0 - 1 %    IMMATURE GRANULOCYTES 0 0.0 - 0.5 %    ABS. NEUTROPHILS 4.7 1.8 - 8.0 K/UL    ABS. LYMPHOCYTES 1.5 0.8 - 3.5 K/UL    ABS. MONOCYTES 0.6 0.0 - 1.0 K/UL    ABS. EOSINOPHILS 0.1 0.0 - 0.4 K/UL    ABS. BASOPHILS 0.1 0.0 - 0.1 K/UL    ABS. IMM.  GRANS. 0.0 0.00 - 0.04 K/UL    DF AUTOMATED METABOLIC PANEL, BASIC   Result Value Ref Range    Sodium 143 136 - 145 mmol/L    Potassium 4.2 3.5 - 5.1 mmol/L    Chloride 110 (H) 97 - 108 mmol/L    CO2 26 21 - 32 mmol/L    Anion gap 7 5 - 15 mmol/L    Glucose 119 (H) 65 - 100 mg/dL    BUN 16 6 - 20 MG/DL    Creatinine 1.86 (H) 0.55 - 1.02 MG/DL    BUN/Creatinine ratio 9 (L) 12 - 20      GFR est AA 31 (L) >60 ml/min/1.73m2    GFR est non-AA 25 (L) >60 ml/min/1.73m2    Calcium 9.1 8.5 - 10.1 MG/DL   PROTHROMBIN TIME + INR   Result Value Ref Range    INR 1.0 0.9 - 1.1      Prothrombin time 10.3 9.0 - 11.1 sec   URINALYSIS W/ RFLX MICROSCOPIC   Result Value Ref Range    Color YELLOW/STRAW      Appearance CLOUDY (A) CLEAR      Specific gravity 1.009 1.003 - 1.030      pH (UA) 7.5 5.0 - 8.0      Protein 30 (A) NEG mg/dL    Glucose NEGATIVE  NEG mg/dL    Ketone NEGATIVE  NEG mg/dL    Bilirubin NEGATIVE  NEG      Blood NEGATIVE  NEG      Urobilinogen 0.2 0.2 - 1.0 EU/dL    Nitrites NEGATIVE  NEG      Leukocyte Esterase MODERATE (A) NEG      WBC 20-50 0 - 4 /hpf    RBC 0-5 0 - 5 /hpf    Epithelial cells FEW FEW /lpf    Bacteria 1+ (A) NEG /hpf    Hyaline cast 2-5 0 - 5 /lpf   GLUCOSE, POC   Result Value Ref Range    Glucose (POC) 121 (H) 65 - 100 mg/dL    Performed by Rian Mckeon (PCT)    EKG, 12 LEAD, INITIAL   Result Value Ref Range    Ventricular Rate 72 BPM    Atrial Rate 227 BPM    QRS Duration 68 ms    Q-T Interval 388 ms    QTC Calculation (Bezet) 424 ms    Calculated P Axis 39 degrees    Calculated R Axis 21 degrees    Calculated T Axis 63 degrees    Diagnosis       Normal sinus rhythm  Left ventricular hypertrophy with repolarization abnormality  Abnormal ECG  Confirmed by Fabio Carvajal MD., Dena ([id number]) on 5/21/2019 5:11:58 PM

## 2019-05-30 NOTE — PROGRESS NOTES
PATIENT STATED NAME &     Chief Complaint   Patient presents with   [address] FOR ANEMIA        Health Maintenance Due   Topic    FOOT EXAM Q1     EYE EXAM RETINAL OR DILATED     Bone Densitometry (Dexa) Screening     MICROALBUMIN Q1        Wt Readings from Last 3 Encounters:   19 126 lb (57.2 kg)   19 128 lb (58.1 kg)   19 128 lb (58.1 kg)     Temp Readings from Last 3 Encounters:   19 97.6 °F (36.4 °C) (Oral)   19 99.9 °F (37.7 °C)   19 98.1 °F (36.7 °C)     BP Readings from Last 3 Encounters:   19 137/65   19 167/63   19 185/73     Pulse Readings from Last 3 Encounters:   19 69   19 77   19 70         Learning Assessment:  :     Learning Assessment 10/2/2018   PRIMARY LEARNER Patient   HIGHEST LEVEL OF EDUCATION - PRIMARY LEARNER  > 4 YEARS OF COLLEGE   PRIMARY LANGUAGE ENGLISH   LEARNER PREFERENCE PRIMARY READING   ANSWERED BY self   RELATIONSHIP SELF       Depression Screening:  :     3 most recent PHQ Screens 2019   Little interest or pleasure in doing things Not at all   Feeling down, depressed, irritable, or hopeless Not at all   Total Score PHQ 2 0       Fall Risk Assessment:  :     Fall Risk Assessment, last 12 mths 2019   Able to walk? Yes   Fall in past 12 months? Yes   Fall with injury? No   Number of falls in past 12 months 2   Fall Risk Score 2       Abuse Screening:  :     Abuse Screening Questionnaire 2019   Do you ever feel afraid of your partner? N   Are you in a relationship with someone who physically or mentally threatens you? N   Is it safe for you to go home? Y       Coordination of Care Questionnaire:  :     1) Have you been to an emergency room, urgent care clinic since your last visit? NO    Hospitalized since your last visit?  YES  [facility]  MAY 2019  [facility]             2) Have you seen or consulted any other health care providers outside of [address] [place] [address] since your last visit? NO    3) Do you have an Advance Directive on file? YES    Patient is accompanied by friend I have received verbal consent from Jeremi Krishnan to discuss any/all medical information while they are present in the room.

## 2019-05-30 NOTE — PATIENT INSTRUCTIONS
Anemia: Care Instructions  Your Care Instructions    Anemia is a low level of red blood cells, which carry oxygen throughout your body. Many things can cause anemia. Lack of iron is one of the most common causes. Your body needs iron to make hemoglobin, a substance in red blood cells that carries oxygen from the lungs to your body's cells. Without enough iron, the body produces fewer and smaller red blood cells. As a result, your body's cells do not get enough oxygen, and you feel tired and weak. And you may have trouble concentrating. Bleeding is the most common cause of a lack of iron. You may have heavy menstrual bleeding or bleeding caused by conditions such as ulcers, hemorrhoids, or cancer. Regular use of aspirin or other anti-inflammatory medicines (such as ibuprofen) also can cause bleeding in some people. A lack of iron in your diet also can cause anemia, especially at times when the body needs more iron, such as during pregnancy, infancy, and the teen years. Your doctor may have prescribed iron pills. It may take several months of treatment for your iron levels to return to normal. Your doctor also may suggest that you eat foods that are rich in iron, such as meat and beans. There are many other causes of anemia. It is not always due to a lack of iron. Finding the specific cause of your anemia will help your doctor find the right treatment for you. Follow-up care is a key part of your treatment and safety. Be sure to make and go to all appointments, and call your doctor if you are having problems. It's also a good idea to know your test results and keep a list of the medicines you take. How can you care for yourself at home? · Take your medicines exactly as prescribed. Call your doctor if you think you are having a problem with your medicine. · If your doctor recommends iron pills, take them as directed:  ? Try to take the pills on an empty stomach about 1 hour before or 2 hours after meals. But you may need to take iron with food to avoid an upset stomach. ? Do not take antacids or drink milk or caffeine drinks (such as coffee, tea, or cola) at the same time or within 2 hours of the time that you take your iron. They can make it hard for your body to absorb the iron. ? Vitamin C (from food or supplements) helps your body absorb iron. Try taking iron pills with a glass of orange juice or some other food that is high in vitamin C, such as citrus fruits. ? Iron pills may cause stomach problems, such as heartburn, nausea, diarrhea, constipation, and cramps. Be sure to drink plenty of fluids, and include fruits, vegetables, and fiber in your diet each day. Iron pills often make your bowel movements dark or green. ? If you forget to take an iron pill, do not take a double dose of iron the next time you take a pill. ? Keep iron pills out of the reach of small children. An overdose of iron can be very dangerous. · Follow your doctor's advice about eating iron-rich foods. These include red meat, shellfish, poultry, eggs, beans, raisins, whole-grain bread, and leafy green vegetables. · Steam vegetables to help them keep their iron content. When should you call for help? Call 911 anytime you think you may need emergency care. For example, call if:    · You have symptoms of a heart attack. These may include:  ? Chest pain or pressure, or a strange feeling in the chest.  ? Sweating. ? Shortness of breath. ? Nausea or vomiting. ? Pain, pressure, or a strange feeling in the back, neck, jaw, or upper belly or in one or both shoulders or arms. ? Lightheadedness or sudden weakness. ? A fast or irregular heartbeat. After you call 911, the  may tell you to chew 1 adult-strength or 2 to 4 low-dose aspirin. Wait for an ambulance.  Do not try to drive yourself.     · You passed out (lost consciousness).    Call your doctor now or seek immediate medical care if:    · You have new or increased shortness of breath.     · You are dizzy or lightheaded, or you feel like you may faint.     · Your fatigue and weakness continue or get worse.     · You have any abnormal bleeding, such as:  ? Nosebleeds. ? Vaginal bleeding that is different (heavier, more frequent, at a different time of the month) than what you are used to.  ? Bloody or black stools, or rectal bleeding. ? Bloody or pink urine.    Watch closely for changes in your health, and be sure to contact your doctor if:    · You do not get better as expected. Where can you learn more? Go to http://nelly-mellissa.info/. Enter R301 in the search box to learn more about \"Anemia: Care Instructions. \"  Current as of: May 6, 2018  Content Version: 11.9  © 4964-2645 [employer]. Care instructions adapted under license by [employer] (which disclaims liability or warranty for this information). If you have questions about a medical condition or this instruction, always ask your healthcare professional. [address] any warranty or liability for your use of this information.

## 2019-06-02 ENCOUNTER — HOSPITAL ENCOUNTER (EMERGENCY)
Age: 84
Discharge: HOME | End: 2019-06-02
Attending: EMERGENCY MEDICINE
Payer: MEDICARE

## 2019-06-02 ENCOUNTER — APPOINTMENT (OUTPATIENT)
Dept: CT IMAGING | Age: 84
End: 2019-06-02
Attending: EMERGENCY MEDICINE
Payer: MEDICARE

## 2019-06-02 ENCOUNTER — APPOINTMENT (OUTPATIENT)
Dept: GENERAL RADIOLOGY | Age: 84
End: 2019-06-02
Attending: EMERGENCY MEDICINE
Payer: MEDICARE

## 2019-06-02 VITALS
HEART RATE: 80 BPM | TEMPERATURE: 98.1 F | BODY MASS INDEX: 20.16 KG/M2 | HEIGHT: 65 IN | WEIGHT: 121 LBS | SYSTOLIC BLOOD PRESSURE: 141 MMHG | OXYGEN SATURATION: 99 % | DIASTOLIC BLOOD PRESSURE: 66 MMHG | RESPIRATION RATE: 16 BRPM

## 2019-06-02 DIAGNOSIS — S01.312A EAR LOBE LACERATION, LEFT, INITIAL ENCOUNTER: ICD-10-CM

## 2019-06-02 DIAGNOSIS — S09.90XA MINOR HEAD INJURY, INITIAL ENCOUNTER: Primary | ICD-10-CM

## 2019-06-02 DIAGNOSIS — M25.552 LEFT HIP PAIN: ICD-10-CM

## 2019-06-02 PROCEDURE — 75810000293 HC SIMP/SUPERF WND  RPR

## 2019-06-02 PROCEDURE — 70450 CT HEAD/BRAIN W/O DYE: CPT

## 2019-06-02 PROCEDURE — 77030039266 HC ADH SKN EXOFIN S2SG -A

## 2019-06-02 PROCEDURE — 73502 X-RAY EXAM HIP UNI 2-3 VIEWS: CPT

## 2019-06-02 PROCEDURE — 99282 EMERGENCY DEPT VISIT SF MDM: CPT

## 2019-06-02 PROCEDURE — 77030018836 HC SOL IRR NACL ICUM -A

## 2019-06-02 NOTE — ED PROVIDER NOTES
80 y.o. female with past medical history significant for breast cancer, diabetes, high cholesterol, hyperlipemia, chronic renal failure, osteopenia, dementia who presents from home with chief complaint of GLF. Pt complains of a laceration to her left ear with associated left hip pain secondary to a glf up the stairs of her house. Pt states she landed on her left side. Pt states she has been able to ambulate normally since the fall. Pt notes she was recently started on a anticoagulant. Pt denies LOC. There are no other acute medical concerns at this time. Social hx: Never Smoker. Denies EtOH Use. PCP: Walter Rojas MD    Note written by Meche Soto, as dictated by Maribel Wood MD 5:08 PM      The history is provided by the patient.         Past Medical History:   Diagnosis Date    Anemia     Breast cancer ([address]     Cancer ([facility] [address]     left breast    Cataract     Chronic renal failure     Colonic polyp     Dementia     Diabetes (HCC)     Eczema     Headache     Hyperlipemia     Hypothyroidism     Ill-defined condition     high cholesterol, hypothyroid    Osteopenia     Posterior vitreous detachment, left     Type 2 diabetes mellitus (HCC)     UTI (urinary tract infection)     Vitamin D deficiency        Past Surgical History:   Procedure Laterality Date    BREAST SURGERY PROCEDURE UNLISTED      left breast lumpectomy    HX APPENDECTOMY      HX CATARACT REMOVAL      HX COLONOSCOPY  2009    HX GYN      hysterectomy    HX MASTECTOMY  1992    S/P Left breast ,edial segmental mastectomy- 1992;    HX ORTHOPAEDIC      back         Family History:   Problem Relation Age of Onset    No Known Problems Mother     No Known Problems Father        Social History     Socioeconomic History    Marital status:      Spouse name: Not on file    Number of children: Not on file    Years of education: Not on file    Highest education level: Not on file   Occupational History  Not on file   Social Needs    Financial resource strain: Not on file    Food insecurity:     Worry: Not on file     Inability: Not on file    Transportation needs:     Medical: Not on file     Non-medical: Not on file   Tobacco Use    Smoking status: Never Smoker    Smokeless tobacco: Never Used   Substance and Sexual Activity    Alcohol use: No    Drug use: No    Sexual activity: Never   Lifestyle    Physical activity:     Days per week: Not on file     Minutes per session: Not on file    Stress: Not on file   Relationships    Social connections:     Talks on phone: Not on file     Gets together: Not on file     Attends [employer] service: Not on file     Active member of club or organization: Not on file     Attends meetings of clubs or organizations: Not on file     Relationship status: Not on file    Intimate partner violence:     Fear of current or ex partner: Not on file     Emotionally abused: Not on file     Physically abused: Not on file     Forced sexual activity: Not on file   Other Topics Concern    Not on file   Social History Narrative    ** Merged History Encounter **              ALLERGIES: Patient has no known allergies. Review of Systems   Constitutional: Negative for fever. HENT: Negative for facial swelling. Eyes: Negative for visual disturbance. Respiratory: Negative for chest tightness. Cardiovascular: Negative for chest pain. Gastrointestinal: Negative for abdominal pain. Genitourinary: Negative for difficulty urinating and dysuria. Musculoskeletal: Positive for arthralgias and myalgias. Skin: Positive for wound. Negative for rash. Neurological: Negative for dizziness. Hematological: Negative for adenopathy. Psychiatric/Behavioral: Negative for suicidal ideas. All other systems reviewed and are negative.       Vitals:    06/02/19 1632   BP: 141/66   Pulse: 80   Resp: 16   Temp: 98.1 °F (36.7 °C)   SpO2: 99%   Weight: 54.9 kg (121 lb)   Height: 5' 5\" (1.651 m)            Physical Exam   Constitutional: She is oriented to person, place, and time. She appears well-developed and well-nourished. No distress. HENT:   Head: Normocephalic and atraumatic. Mouth/Throat: Oropharynx is clear and moist.   Eyes: Pupils are equal, round, and reactive to light. No scleral icterus. Neck: Normal range of motion. Neck supple. No thyromegaly present. Cardiovascular: Normal rate, regular rhythm, normal heart sounds and intact distal pulses. No murmur heard. Pulmonary/Chest: Effort normal and breath sounds normal. No respiratory distress. Abdominal: Soft. Bowel sounds are normal. She exhibits no distension. There is no tenderness. Musculoskeletal: Normal range of motion. She exhibits no edema. Neurological: She is alert and oriented to person, place, and time. Skin: Skin is warm and dry. No rash noted. She is not diaphoretic. Nursing note and vitals reviewed. Note written by Larisa Hendrickson. Alirio Williamson, as dictated by Dipti Vigil MD 5:12 PM    MDM  Number of Diagnoses or Management Options  Ear lobe laceration, left, initial encounter:   Left hip pain:   Minor head injury, initial encounter:       CT head and xray l hip unremarkable. 5:42 PM  Procedure Note - LACERATION REPAIR WITH GLUE:    5:42 PM  Performed by Dipti Vigil MD .      Wound Location: **L ear canal*  Wound Size: *1**cm  Debridement: No  Nerve Involvement: No  Tendon Involvement: No  Foreign Bodies Found:  None    Wound edges anesthetized with none. Wound irrigated using 500cc of saline under high pressure. Wound explored for foreign bodies and none found. Wound edges reapproximated and closed using medical glue. Procedure was well tolerated with no complications.   Pt advised on strategies to minimize scarring including but not limited to the use of antibiotic ointment for next 3 days to minimize scabbing and avoidance of direct sunlight for 6 months.     Procedures

## 2019-06-02 NOTE — DISCHARGE INSTRUCTIONS
Patient Education        Learning About a Closed Head Injury  What is a closed head injury? A closed head injury happens when your head gets hit hard. The strong force of the blow causes your brain to shake in your skull. This movement can cause the brain to bruise, swell, or tear. Sometimes nerves or blood vessels also get damaged. This can cause bleeding in or around the brain. A concussion is a type of closed head injury. What are the symptoms? If you have a mild concussion, you may have a mild headache or feel \"not quite right. \" These symptoms are common. They usually go away over a few days to 4 weeks. But sometimes after a concussion, you feel like you can't function as well as before the injury. And you have new symptoms. This is called postconcussive syndrome. You may:  · Find it harder to solve problems, think, concentrate, or remember. · Have headaches. · Have changes in your sleep patterns, such as not being able to sleep or sleeping all the time. · Have changes in your personality. · Not be interested in your usual activities. · Feel angry or anxious without a clear reason. · Lose your sense of taste or smell. · Be dizzy, lightheaded, or unsteady. It may be hard to stand or walk. How is a closed head injury treated? Any person who may have a concussion needs to see a doctor. Some people have to stay in the hospital to be watched. Others can go home safely. If you go home, follow your doctor's instructions. He or she will tell you if you need someone to watch you closely for the next 24 hours or longer. Rest is the best treatment. Get plenty of sleep at night. And try to rest during the day. · Avoid activities that are physically or mentally demanding. These include housework, exercise, and schoolwork. And don't play video games, send text messages, or use the computer. You may need to change your school or work schedule to be able to avoid these activities.   · Ask your doctor when it's okay to drive, ride a bike, or operate machinery. · Take an over-the-counter pain medicine, such as acetaminophen (Tylenol), ibuprofen (Advil, Motrin), or naproxen (Aleve). Be safe with medicines. Read and follow all instructions on the label. · Check with your doctor before you use any other medicines for pain. · Do not drink alcohol or use illegal drugs. They can slow recovery. They can also increase your risk of getting a second head injury. Follow-up care is a key part of your treatment and safety. Be sure to make and go to all appointments, and call your doctor if you are having problems. It's also a good idea to know your test results and keep a list of the medicines you take. Where can you learn more? Go to http://nelly-mellissa.info/. Enter E235 in the search box to learn more about \"Learning About a Closed Head Injury. \"  Current as of: Josephine 3, 2018  Content Version: 11.9  © 2895-4899 [employer]. Care instructions adapted under license by [employer] (which disclaims liability or warranty for this information). If you have questions about a medical condition or this instruction, always ask your healthcare professional. [place] [address] any warranty or liability for your use of this information. Patient Education        Hip Pain: Care Instructions  Your Care Instructions    Hip pain may be caused by many things, including overuse, a fall, or a twisting movement. Another cause of hip pain is arthritis. Your pain may increase when you stand up, walk, or squat. The pain may come and go or may be constant. Home treatment can help relieve hip pain, swelling, and stiffness. If your pain is ongoing, you may need more tests and treatment. Follow-up care is a key part of your treatment and safety. Be sure to make and go to all appointments, and call your doctor if you are having problems.  It's also a good idea to know your test results and keep a list of the medicines you take. How can you care for yourself at home? · Take pain medicines exactly as directed. ? If the doctor gave you a prescription medicine for pain, take it as prescribed. ? If you are not taking a prescription pain medicine, ask your doctor if you can take an over-the-counter medicine. · Rest and protect your hip. Take a break from any activity, including standing or walking, that may cause pain. · Put ice or a cold pack against your hip for 10 to 20 minutes at a time. Try to do this every 1 to 2 hours for the next 3 days (when you are awake) or until the swelling goes down. Put a thin cloth between the ice and your skin. · Sleep on your healthy side with a pillow between your knees, or sleep on your back with pillows under your knees. · If there is no swelling, you can put moist heat, a heating pad, or a warm cloth on your hip. Do gentle stretching exercises to help keep your hip flexible. · Learn how to prevent falls. Have your vision and hearing checked regularly. Wear slippers or shoes with a nonskid sole. · Stay at a healthy weight. · Wear comfortable shoes. When should you call for help? Call 911 anytime you think you may need emergency care. For example, call if:    · You have sudden chest pain and shortness of breath, or you cough up blood.     · You are not able to stand or walk or bear weight.     · Your buttocks, legs, or feet feel numb or tingly.     · Your leg or foot is cool or pale or changes color.     · You have severe pain.    Call your doctor now or seek immediate medical care if:    · You have signs of infection, such as:  ? Increased pain, swelling, warmth, or redness in the hip area. ? Red streaks leading from the hip area. ? Pus draining from the hip area. ? A fever.     · You have signs of a blood clot, such as:  ? Pain in your calf, back of the knee, thigh, or groin. ?  Redness and swelling in your leg or groin.     · You are not able to bend, straighten, or move your leg normally.     · You have trouble urinating or having bowel movements.    Watch closely for changes in your health, and be sure to contact your doctor if:    · You do not get better as expected. Where can you learn more? Go to http://nelly-mellissa.info/. Enter M624 in the search box to learn more about \"Hip Pain: Care Instructions. \"  Current as of: September 23, 2018  Content Version: 11.9  © 3035-3732 [employer]. Care instructions adapted under license by [employer] (which disclaims liability or warranty for this information). If you have questions about a medical condition or this instruction, always ask your healthcare professional. [place] [address] any warranty or liability for your use of this information. Patient Education        Cuts Closed With Adhesives: Care Instructions  Your Care Instructions  A cut can happen anywhere on your body. The doctor used an adhesive to close the cut. When the adhesive dries, it forms a film that holds the edges of the cut together. Skin adhesives are sometimes called liquid stitches. If the cut went deep and through the skin, the doctor may have put in a layer of stitches below the adhesive. The deeper layer of stitches brings the deep part of the cut together. These stitches will dissolve and don't need to be removed. You don't see the stitches, only the adhesive. You may have a bandage. The doctor has checked you carefully, but problems can develop later. If you notice any problems or new symptoms, get medical treatment right away. Follow-up care is a key part of your treatment and safety. Be sure to make and go to all appointments, and call your doctor if you are having problems. It's also a good idea to know your test results and keep a list of the medicines you take. How can you care for yourself at home? · Keep the cut dry for the first 24 to 48 hours.  After this, you can shower if your doctor okays it. Pat the cut dry. · Don't soak the cut, such as in a bathtub. Your doctor will tell you when it's safe to get the cut wet. · If your doctor told you how to care for your cut, follow your doctor's instructions. If you did not get instructions, follow this general advice:  ? Do not put any kind of ointment, cream, or lotion over the area. This can make the adhesive fall off too soon. ? After the first 24 to 48 hours, wash around the cut with clean water 2 times a day. Do not use hydrogen peroxide or alcohol, which can slow healing. ? If the doctor told you to use a bandage, put on a new bandage after cleaning the cut or if the bandage gets wet or dirty. · Prop up the sore area on a pillow anytime you sit or lie down during the next 3 days. Try to keep it above the level of your heart. This will help reduce swelling. · Leave the skin adhesive on your skin until it falls off on its own. This may take 5 to 10 days. · Do not scratch, rub, or pick at the adhesive. · Do not put the sticky part of a bandage directly on the adhesive. · Avoid any activity that could cause your cut to reopen. · Be safe with medicines. Read and follow all instructions on the label. ? If the doctor gave you a prescription medicine for pain, take it as prescribed. ? If you are not taking a prescription pain medicine, ask your doctor if you can take an over-the-counter medicine. When should you call for help? Call your doctor now or seek immediate medical care if:    · You have new pain, or your pain gets worse.     · The skin near the cut is cold or pale or changes color.     · You have tingling, weakness, or numbness near the cut.     · The cut starts to bleed.     · You have trouble moving the area near the cut.     · You have symptoms of infection, such as:  ? Increased pain, swelling, warmth, or redness around the cut.  ?  Red streaks leading from the cut.  ? Pus draining from the cut.  ? A fever.    Watch closely for changes in your health, and be sure to contact your doctor if:    · The cut reopens.     · You do not get better as expected. Where can you learn more? Go to http://nelly-mellissa.info/. Enter P174 in the search box to learn more about \"Cuts Closed With Adhesives: Care Instructions. \"  Current as of: September 23, 2018  Content Version: 11.9  © 1246-6834 [employer]. Care instructions adapted under license by [employer] (which disclaims liability or warranty for this information). If you have questions about a medical condition or this instruction, always ask your healthcare professional. [address] any warranty or liability for your use of this information.

## 2019-06-02 NOTE — ED TRIAGE NOTES
Pt arrived to ED due to [facility] and small lac to left ear. Denies loss of consciousness. Bleeding has ceased from lac in triage.

## 2019-06-03 ENCOUNTER — PATIENT OUTREACH (OUTPATIENT)
Dept: FAMILY MEDICINE CLINIC | Age: 84
End: 2019-06-03

## 2019-06-04 ENCOUNTER — PATIENT OUTREACH (OUTPATIENT)
Dept: FAMILY MEDICINE CLINIC | Age: 84
End: 2019-06-04

## 2019-06-04 NOTE — PROGRESS NOTES
This NN has left a message for [address] working with pt at [facility], awaiting return call. Wanted to check on status of patient.

## 2019-06-05 ENCOUNTER — PATIENT OUTREACH (OUTPATIENT)
Dept: FAMILY MEDICINE CLINIC | Age: 84
End: 2019-06-05

## 2019-06-05 NOTE — PROGRESS NOTES
This NN spoke with Graeme Hughes nurse from [facility] that is seeing pt. She has some concerns that the pt is not taking correct medications, pt had been refusing pt. Graeme Hughes was not aware that pt had a recent ED visit on 6/2/19. Graeme Hughes will be back to have a home visit with pt on 6/6 and will update NN if the pt had gotten all of her prescribed mediations from her recent hospitalization.

## 2019-06-12 ENCOUNTER — TELEPHONE (OUTPATIENT)
Dept: FAMILY MEDICINE CLINIC | Age: 84
End: 2019-06-12

## 2019-06-12 ENCOUNTER — OFFICE VISIT (OUTPATIENT)
Dept: FAMILY MEDICINE CLINIC | Age: 84
End: 2019-06-12

## 2019-06-12 VITALS
BODY MASS INDEX: 19.99 KG/M2 | DIASTOLIC BLOOD PRESSURE: 66 MMHG | WEIGHT: 120 LBS | HEART RATE: 75 BPM | RESPIRATION RATE: 16 BRPM | TEMPERATURE: 97.6 F | SYSTOLIC BLOOD PRESSURE: 130 MMHG | HEIGHT: 65 IN | OXYGEN SATURATION: 100 %

## 2019-06-12 DIAGNOSIS — I10 ESSENTIAL HYPERTENSION: ICD-10-CM

## 2019-06-12 DIAGNOSIS — R55 NEAR SYNCOPE: ICD-10-CM

## 2019-06-12 DIAGNOSIS — I95.1 ORTHOSTATIC HYPOTENSION: Primary | ICD-10-CM

## 2019-06-12 NOTE — PROGRESS NOTES
Identified pt with two pt identifiers(name and ). Reviewed record in preparation for visit and have obtained necessary documentation. Chief Complaint   Patient presents with    TIA     10 day f/u   [facility] Follow Up     [facility] ED 19 for minor head injury and TIA f/u        Health Maintenance Due   Topic    FOOT EXAM Q1     EYE EXAM RETINAL OR DILATED     Bone Densitometry (Dexa) Screening     MICROALBUMIN Q1        Coordination of Care Questionnaire:  :   1) Have you been to an emergency room, urgent care, or hospitalized since your last visit? If yes, where when, and reason for visit? yes   ED for minor head injury, f/u TIA    2. Have seen or consulted any other health care provider since your last visit? If yes, where when, and reason for visit? NO      Patient is accompanied by self I have received verbal consent from Sarthak Quinteros to discuss any/all medical information while they are present in the room.

## 2019-06-12 NOTE — PROGRESS NOTES
Assessment and Plan    1. Orthostatic hypotension  Still having episodes of light headedness and near syncope on 2.5 mg of amlodipine so will DC. DX of orthostatic hypotension in past  - AMB POC EKG ROUTINE W/ 12 LEADS, INTER & REP    2. Essential hypertension  At goal on low dose amlodipine    3. Near syncope  As above, dc amlodipine  FU recheck bp and sx in 3-4 weeks. If more \"episodes\" consider holter looking for rhythm disturbances      Follow-up and Dispositions    · Return in about 1 month (around 7/12/2019) for Medication follow up. Diagnosis and plan discussed with patient who verbillized understanding. History of present Jason Aceves is a 80 y.o. female presenting for TIA (10 day f/u) and Hospital Follow Up [facility] ED 6/2/19 for minor head injury and TIA f/u)    Dewanda Flurry twice since last visit  Feels like she is weak and legs give way and she falls  Dizziness come on and she goes down  Had minor head laceration at ER  Previously treated for orthostatic hypotension  Feels weak when this comes on  Feels very strange. IF she gets to a chair, she is fine    Very upset today. Younger sister had a hemorrhagic stroke.     Recent hospitalization for TIA  No bradycardias or other arrythmias noted  Placed on amlodipine for BP that was out of control    ROS      Past Medical History:   Diagnosis Date    Anemia     Breast cancer ([address]     Cancer ([facility]     left breast    Cataract     Chronic renal failure     Colonic polyp     Dementia     Diabetes [facility]     Eczema     Headache     Hyperlipemia     Hypothyroidism     Ill-defined condition     high cholesterol, hypothyroid    Osteopenia     Posterior vitreous detachment, left     Type 2 diabetes mellitus (HCC)     UTI (urinary tract infection)     Vitamin D deficiency      Past Surgical History:   Procedure Laterality Date    BREAST SURGERY PROCEDURE UNLISTED      left breast lumpectomy    HX APPENDECTOMY      HX CATARACT REMOVAL      HX COLONOSCOPY  2009    HX GYN      hysterectomy    HX MASTECTOMY  1992    S/P Left breast ,edial segmental mastectomy- 1992;    HX ORTHOPAEDIC      back     Family History   Problem Relation Age of Onset    No Known Problems Mother     No Known Problems Father      Social History     Socioeconomic History    Marital status:      Spouse name: Not on file    Number of children: Not on file    Years of education: Not on file    Highest education level: Not on file   Occupational History    Not on file   Social Needs    Financial resource strain: Not on file    Food insecurity:     Worry: Not on file     Inability: Not on file    Transportation needs:     Medical: Not on file     Non-medical: Not on file   Tobacco Use    Smoking status: Never Smoker    Smokeless tobacco: Never Used   Substance and Sexual Activity    Alcohol use: No    Drug use: No    Sexual activity: Never   Lifestyle    Physical activity:     Days per week: Not on file     Minutes per session: Not on file    Stress: Not on file   Relationships    Social connections:     Talks on phone: Not on file     Gets together: Not on file     Attends Scientologist service: Not on file     Active member of club or organization: Not on file     Attends meetings of clubs or organizations: Not on file     Relationship status: Not on file    Intimate partner violence:     Fear of current or ex partner: Not on file     Emotionally abused: Not on file     Physically abused: Not on file     Forced sexual activity: Not on file   Other Topics Concern    Not on file   Social History Narrative    ** Merged History Encounter **              Current Outpatient Medications   Medication Sig Dispense Refill    levothyroxine (SYNTHROID) 50 mcg tablet TAKE 1 TABLET BY MOUTH ONCE DAILY 90 Tab 1    ferrous sulfate 325 mg (65 mg iron) tablet Take 1 Tab by mouth two (2) times daily (with meals).  60 Tab 2    clopidogrel (PLAVIX) 75 mg tab Take 1 Tab by mouth daily. 90 Tab 1    atorvastatin (LIPITOR) 40 mg tablet Take 1 Tab by mouth daily. 90 Tab 1    amLODIPine (NORVASC) 2.5 mg tablet Take 1 Tab by mouth daily. 90 Tab 1    polyethylene glycol (MIRALAX) 17 gram packet Take 1 Packet by mouth daily. 30 Packet 0    cholecalciferol, vitamin D3, (VITAMIN D3 PO) Take  by mouth.  glucose blood VI test strips (BLOOD GLUCOSE TEST) strip by Does Not Apply route See Admin Instructions.  aspirin 81 mg chewable tablet Take 1 Tab by mouth daily. (Patient not taking: Reported on 6/12/2019) 30 Tab 0    varicella-zoster recombinant, PF, (SHINGRIX, PF,) 50 mcg/0.5 mL susr injection 0.5mL by IntraMUSCular route once now and then repeat in 2-6 months 0.5 mL 1    butalbital-acetaminophen-caff (FIORICET) -40 mg per capsule Take 1 Cap by mouth every six (6) hours as needed for Pain. Max Daily Amount: 4 Caps. 20 Cap 0         No Known Allergies    Vitals:    06/12/19 1502 06/12/19 1511   BP: 137/69 130/66   Pulse: 71 75   Resp: 16    Temp: 97.6 °F (36.4 °C)    TempSrc: Oral    SpO2: 100%    Weight: 120 lb (54.4 kg)    Height: 5' 5\" (1.651 m)      Body mass index is 19.97 kg/m². Objective  General: Patient alert and oriented and in NAD  Neck: No thyromegaly or cervical lymphadenopathy  Cardiovascular: Heart has regular rate and rhythm, No murmurs, rubs or gallops. No edema  Respiratory: Lungs are clear to auscultation bilaterally, no wheezing, rales or rhonchi, normal chest excursion and no increased work of breathing. Musculoskeletal: All four extremities present and functional.   Skin: No rashes or lesions noted on exposed skin  Neuro: AAOx3, normal gait and speech. No gross neurologic deficits. Psych: Appropriate mood and affect, no homicidal or suicidal ideation, no obsessions, delusions or hallucinations, normal psychomotor status.     EKG normal

## 2019-06-12 NOTE — TELEPHONE ENCOUNTER
[phone] from [address] [place] [address] Ajit Arnold called asking if we could fax an order for pt to receive a  through home health and Physical therapy if dr Joss Ring could talk the patient into it Fax number [phone]

## 2019-06-14 NOTE — TELEPHONE ENCOUNTER
Call patient. I'm going to send home health out to see her for physical therapy to help her get stronger and see if we can avoid some falls. Letter for home health is in my outbox.

## 2019-07-09 NOTE — PROGRESS NOTES
ED Discharge Follow-Up Date/Time:     6/3/19   4:36 PM 
 
Patient presented to [facility]  ED on 19 and was diagnosed with Minor Head Injury. Top Challenges reviewed with the provider None Method of communication with provider :none Ambulatory Care Manager(ACM) contacted the  patient  by telephone to perform post ED discharge assessment. Verified name and  with patient as identifiers. Provided introduction to self, and explanation of the Ambulatory Care Manager's role. Patient reported assessment: Spoke with the patient and she states that she is doing better. Medication(s):  
New Medications at Discharge: None Changed Medications at Discharge: None Discontinued Medications at Discharge: None There were no barriers to obtaining medications identified at this time. Reviewed discharge instructions and red flags with  patient who voiced understanding. Patient given an opportunity to ask questions and does not have any further questions or concerns at this time. The patient agrees to contact the PCP office for questions related to their healthcare. Patient reminded that there are physicians on call 24 hours a day / 7 days a week (- 5pm to 8am and from Friday 5pm until Monday 8am for the weekend) should the patient have questions or concerns. ACM provided contact information for future reference. Offered follow up appointment with PCP: yes [facility] follow up appointment(s):  
Future Appointments Date Time Provider [place] 2019  2:20 PM Clifton Huerta MD [address] [place] [address] Non-[facility] follow up appointment(s): None [email or web address]:  information provided as a resource  
 www. [employer] 9 AM- 9 PM.   Phone [phone]

## 2019-07-10 ENCOUNTER — TELEPHONE (OUTPATIENT)
Dept: FAMILY MEDICINE CLINIC | Age: 84
End: 2019-07-10

## 2019-07-10 NOTE — TELEPHONE ENCOUNTER
Addressed earlier,  Caregiver will take her to [facility]

## 2019-07-10 NOTE — TELEPHONE ENCOUNTER
Physical Therapist needs verbal order to continue at home physical therapy , twice a week for four weeks.  Vanita Eisenmenger advance care [phone]

## 2019-07-10 NOTE — TELEPHONE ENCOUNTER
Pt called complaining of dizziness, feeling like she had a stroke on Sunday 7-7-19. Gave call to Arie Simms spoke to nurse that was there with her Niko Alli buckner and said please take her to [facility], they are in [facility] and it is close to her house. Pt did not want to go to ER.  Headache, off balance, dizzy, unsteady, Blood pressure 104/64

## 2019-07-17 ENCOUNTER — OFFICE VISIT (OUTPATIENT)
Dept: FAMILY MEDICINE CLINIC | Age: 84
End: 2019-07-17

## 2019-07-17 VITALS
SYSTOLIC BLOOD PRESSURE: 110 MMHG | HEART RATE: 69 BPM | DIASTOLIC BLOOD PRESSURE: 51 MMHG | TEMPERATURE: 97.7 F | HEIGHT: 65 IN | OXYGEN SATURATION: 96 % | BODY MASS INDEX: 20.33 KG/M2 | RESPIRATION RATE: 16 BRPM | WEIGHT: 122 LBS

## 2019-07-17 DIAGNOSIS — I95.1 ORTHOSTATIC HYPOTENSION: ICD-10-CM

## 2019-07-17 DIAGNOSIS — I10 ESSENTIAL HYPERTENSION: Primary | ICD-10-CM

## 2019-07-17 DIAGNOSIS — G45.9 TIA (TRANSIENT ISCHEMIC ATTACK): ICD-10-CM

## 2019-07-17 NOTE — PROGRESS NOTES
Assessment and Plan    1. Essential hypertension  Stay off of BP meds as has high fall risk. Currently normotensive off of all meds    2. Orthostatic hypotension  Off of florinef, stay well hydrated,  RX for rolator given, use support stockings    3. TIA (transient ischemic attack)  May DC aspirin, on plavix only. See Nephrology as scheduled. Follow-up and Dispositions    · Return in about 3 months (around 10/17/2019) for Blood pressure follow up, Medication follow up. Diagnosis and plan discussed with patient who verbillized understanding. History of present Shilpa Schumacher is a 80 y.o. female presenting for Medication Evaluation (1mo f/u; amlodipine dc'd at last visit on 6/12/19, d/t hypotension/syncope)    Last visit we stopped amlodipine due to dizziness and falls  Last Sunday had a near fall hitting several walls while she was walking in house. No seizure activity  No LOC  Eating and drinking well. Her caregiver is her today  Notes that patient gets dizzy if she gets up fast and is wobbly if she walks  No falls in her presence. Sees Nephrology tomorrow. Having PT 1-2 times per week. Review of Systems   Constitutional: Positive for malaise/fatigue. Respiratory: Negative for shortness of breath. Cardiovascular: Negative for chest pain. Gastrointestinal: Negative. Genitourinary: Negative. Musculoskeletal: Positive for falls. Neurological: Positive for dizziness and weakness. Negative for seizures and loss of consciousness.          Past Medical History:   Diagnosis Date    Anemia     [facility]     Cancer ([facility]     left breast    Cataract     Chronic renal failure     Colonic polyp     Dementia     Diabetes (HCC)     Eczema     Headache     Hyperlipemia     Hypothyroidism     Ill-defined condition     high cholesterol, hypothyroid    Osteopenia     Posterior vitreous detachment, left     Type 2 diabetes mellitus ([address]     UTI (urinary tract infection)     Vitamin D deficiency      Past Surgical History:   Procedure Laterality Date    BREAST SURGERY PROCEDURE UNLISTED      left breast lumpectomy    HX APPENDECTOMY      HX CATARACT REMOVAL      HX COLONOSCOPY  2009    HX GYN      hysterectomy    HX MASTECTOMY  1992    S/P Left breast ,edial segmental mastectomy- 1992;    HX ORTHOPAEDIC      back     Family History   Problem Relation Age of Onset    No Known Problems Mother     No Known Problems Father      Social History     Socioeconomic History    Marital status:      Spouse name: Not on file    Number of children: Not on file    Years of education: Not on file    Highest education level: Not on file   Occupational History    Not on file   Social Needs    Financial resource strain: Not on file    Food insecurity:     Worry: Not on file     Inability: Not on file    Transportation needs:     Medical: Not on file     Non-medical: Not on file   Tobacco Use    Smoking status: Never Smoker    Smokeless tobacco: Never Used   Substance and Sexual Activity    Alcohol use: No    Drug use: No    Sexual activity: Never   Lifestyle    Physical activity:     Days per week: Not on file     Minutes per session: Not on file    Stress: Not on file   Relationships    Social connections:     Talks on phone: Not on file     Gets together: Not on file     Attends Church service: Not on file     Active member of club or organization: Not on file     Attends meetings of clubs or organizations: Not on file     Relationship status: Not on file    Intimate partner violence:     Fear of current or ex partner: Not on file     Emotionally abused: Not on file     Physically abused: Not on file     Forced sexual activity: Not on file   Other Topics Concern    Not on file   Social History Narrative    ** Merged History Encounter **              Current Outpatient Medications   Medication Sig Dispense Refill    levothyroxine (SYNTHROID) 50 mcg tablet TAKE 1 TABLET BY MOUTH ONCE DAILY 90 Tab 1    ferrous sulfate 325 mg (65 mg iron) tablet Take 1 Tab by mouth two (2) times daily (with meals). 60 Tab 2    clopidogrel (PLAVIX) 75 mg tab Take 1 Tab by mouth daily. 90 Tab 1    atorvastatin (LIPITOR) 40 mg tablet Take 1 Tab by mouth daily. 90 Tab 1    varicella-zoster recombinant, PF, (SHINGRIX, PF,) 50 mcg/0.5 mL susr injection 0.5mL by IntraMUSCular route once now and then repeat in 2-6 months 0.5 mL 1    cholecalciferol, vitamin D3, (VITAMIN D3 PO) Take  by mouth.  glucose blood VI test strips (BLOOD GLUCOSE TEST) strip by Does Not Apply route See Admin Instructions.  polyethylene glycol (MIRALAX) 17 gram packet Take 1 Packet by mouth daily. 30 Packet 0    butalbital-acetaminophen-caff (FIORICET) -40 mg per capsule Take 1 Cap by mouth every six (6) hours as needed for Pain. Max Daily Amount: 4 Caps. 20 Cap 0         No Known Allergies    Vitals:    07/17/19 1419   BP: 110/51   Pulse: 69   Resp: 16   Temp: 97.7 °F (36.5 °C)   TempSrc: Oral   SpO2: 96%   Weight: 122 lb (55.3 kg)   Height: 5' 5\" (1.651 m)     Body mass index is 20.3 kg/m². Objective  General: Patient alert and oriented and in NAD  Neck: No thyromegaly or cervical lymphadenopathy  Cardiovascular: Heart has regular rate and rhythm, No murmurs, rubs or gallops. No edema  Respiratory: Lungs are clear to auscultation bilaterally, no wheezing, rales or rhonchi, normal chest excursion and no increased work of breathing. Musculoskeletal: All four extremities present and functional.   Skin: No rashes or lesions noted on exposed skin  Neuro: AAOx3, normal gait and speech. No gross neurologic deficits. Psych: Appropriate mood and affect, no homicidal or suicidal ideation, no obsessions, delusions or hallucinations, normal psychomotor status.

## 2019-07-17 NOTE — PATIENT INSTRUCTIONS
Orthostatic Hypotension: Care Instructions  Your Care Instructions    Orthostatic hypotension is a quick drop in blood pressure. It happens when you get up from sitting or lying down. You may feel faint, lightheaded, or dizzy. When a person sits up or stands up, the body changes the way it pumps blood. This can slow the flow of blood to the brain for a very short time. And that can make you feel lightheaded. Many medicines can cause this problem, especially in older people. Lack of fluids (dehydration) or illnesses such as diabetes or heart disease also can cause it. Follow-up care is a key part of your treatment and safety. Be sure to make and go to all appointments, and call your doctor if you are having problems. It's also a good idea to know your test results and keep a list of the medicines you take. How can you care for yourself at home? · Tell your doctor about any problems you have with your medicines. · If your doctor prescribes medicine to help prevent a low blood pressure problem, take it exactly as prescribed. Call your doctor if you think you are having a problem with your medicine. · Drink plenty of fluids, enough so that your urine is light yellow or clear like water. Choose water and other caffeine-free clear liquids. If you have kidney, heart, or liver disease and have to limit fluids, talk with your doctor before you increase the amount of fluids you drink. · Limit or avoid alcohol and caffeine. · Get up slowly from bed or after sitting for a long time. If you are in bed, roll to your side and swing your legs over the edge of the bed and onto the floor. Push your body up to a sitting position. Wait for a while before you slowly stand up. If you are dizzy or lightheaded, sit or lie down. When should you call for help? Call 911 anytime you think you may need emergency care.  For example, call if:    · You passed out (lost consciousness).    Watch closely for changes in your health, and be sure to contact your doctor if:    · You do not get better as expected. Where can you learn more? Go to http://nelly-mellissa.info/. Enter V524 in the search box to learn more about \"Orthostatic Hypotension: Care Instructions. \"  Current as of: July 22, 2018  Content Version: 11.9  © 2640-2869 [employer]. Care instructions adapted under license by [employer] (which disclaims liability or warranty for this information). If you have questions about a medical condition or this instruction, always ask your healthcare professional. [place] [address] any warranty or liability for your use of this information.

## 2019-07-17 NOTE — LETTER
7/17/2019 3:17 PM 
 
 
 
RE 
Ms. Jason Glaser [address] [facility] [phone] Dear Mary Linton or Kev Vila, Please supply this patient with a rollator chair. Diagnosis: age related debility, warning stroke and orthostatic hypotension Sincerely, Cheryle Mask, MD

## 2019-07-17 NOTE — PROGRESS NOTES
Identified pt with two pt identifiers(name and ). Reviewed record in preparation for visit and have obtained necessary documentation. Chief Complaint   Patient presents with    Medication Evaluation     1mo f/u; amlodipine dc'd at last visit on 19, d/t hypotension/syncope        Health Maintenance Due   Topic    FOOT EXAM Q1     EYE EXAM RETINAL OR DILATED     Bone Densitometry (Dexa) Screening     MICROALBUMIN Q1     Shingrix Vaccine Age 50> (2 of 2)       Coordination of Care Questionnaire:  :   1) Have you been to an emergency room, urgent care, or hospitalized since your last visit? If yes, where when, and reason for visit? yes   - 7/10/19: [facility] ED, was told she may have had a seizure    2. Have seen or consulted any other health care provider since your last visit? If yes, where when, and reason for visit? NO      Patient is accompanied by granddaughter I have received verbal consent from Carloz Atkinson to discuss any/all medical information while they are present in the room.

## 2019-08-20 DIAGNOSIS — D64.9 ANEMIA, UNSPECIFIED TYPE: ICD-10-CM

## 2019-08-20 RX ORDER — LANOLIN ALCOHOL/MO/W.PET/CERES
CREAM (GRAM) TOPICAL
Qty: 60 TAB | Refills: 2 | Status: SHIPPED | OUTPATIENT
Start: 2019-08-20 | End: 2019-09-12 | Stop reason: SDUPTHER

## 2019-09-06 ENCOUNTER — TELEPHONE (OUTPATIENT)
Dept: FAMILY MEDICINE CLINIC | Age: 84
End: 2019-09-06

## 2019-09-06 DIAGNOSIS — E03.9 ACQUIRED HYPOTHYROIDISM: ICD-10-CM

## 2019-09-06 RX ORDER — LEVOTHYROXINE SODIUM 50 UG/1
TABLET ORAL
Qty: 90 TAB | Refills: 1 | Status: SHIPPED | OUTPATIENT
Start: 2019-09-06 | End: 2019-09-30 | Stop reason: SDUPTHER

## 2019-09-06 RX ORDER — CALCIUM CITRATE/VITAMIN D3 200MG-6.25
TABLET ORAL
Qty: 100 STRIP | Refills: 5 | Status: SHIPPED | OUTPATIENT
Start: 2019-09-06 | End: 2019-09-11 | Stop reason: SDUPTHER

## 2019-09-11 DIAGNOSIS — E11.9 TYPE 2 DIABETES MELLITUS WITHOUT COMPLICATION, WITHOUT LONG-TERM CURRENT USE OF INSULIN (HCC): Primary | ICD-10-CM

## 2019-09-12 DIAGNOSIS — D64.9 ANEMIA, UNSPECIFIED TYPE: ICD-10-CM

## 2019-09-13 RX ORDER — LANOLIN ALCOHOL/MO/W.PET/CERES
CREAM (GRAM) TOPICAL
Qty: 60 TAB | Refills: 2 | Status: SHIPPED | OUTPATIENT
Start: 2019-09-13 | End: 2019-09-30 | Stop reason: SDUPTHER

## 2019-09-25 ENCOUNTER — TELEPHONE (OUTPATIENT)
Dept: FAMILY MEDICINE CLINIC | Age: 84
End: 2019-09-25

## 2019-09-25 NOTE — TELEPHONE ENCOUNTER
Patient has not been steady on her feet for the last couple of day. Very tired and no appetite. Not sleeping very well for a period time    No fever/ no chill  B/p  126/69    Patient made appt on 9/30 with Dr. Rogelio Dickerson.   Only wants to see Dr. Rogelio Dickerson

## 2019-09-30 ENCOUNTER — OFFICE VISIT (OUTPATIENT)
Dept: FAMILY MEDICINE CLINIC | Age: 84
End: 2019-09-30

## 2019-09-30 VITALS
SYSTOLIC BLOOD PRESSURE: 126 MMHG | HEART RATE: 61 BPM | OXYGEN SATURATION: 96 % | DIASTOLIC BLOOD PRESSURE: 61 MMHG | TEMPERATURE: 98.3 F | WEIGHT: 121 LBS | HEIGHT: 65 IN | BODY MASS INDEX: 20.16 KG/M2 | RESPIRATION RATE: 16 BRPM

## 2019-09-30 DIAGNOSIS — G45.9 TIA (TRANSIENT ISCHEMIC ATTACK): ICD-10-CM

## 2019-09-30 DIAGNOSIS — I95.1 ORTHOSTATIC HYPOTENSION: Primary | ICD-10-CM

## 2019-09-30 DIAGNOSIS — E03.9 ACQUIRED HYPOTHYROIDISM: ICD-10-CM

## 2019-09-30 DIAGNOSIS — I10 ESSENTIAL HYPERTENSION: ICD-10-CM

## 2019-09-30 DIAGNOSIS — D64.9 ANEMIA, UNSPECIFIED TYPE: ICD-10-CM

## 2019-09-30 RX ORDER — ATORVASTATIN CALCIUM 40 MG/1
40 TABLET, FILM COATED ORAL DAILY
Qty: 90 TAB | Refills: 1 | Status: SHIPPED | OUTPATIENT
Start: 2019-09-30 | End: 2019-10-18 | Stop reason: SDUPTHER

## 2019-09-30 RX ORDER — LEVOTHYROXINE SODIUM 50 UG/1
TABLET ORAL
Qty: 90 TAB | Refills: 1 | Status: SHIPPED | OUTPATIENT
Start: 2019-09-30 | End: 2019-12-09 | Stop reason: SDUPTHER

## 2019-09-30 RX ORDER — LANOLIN ALCOHOL/MO/W.PET/CERES
325 CREAM (GRAM) TOPICAL 2 TIMES DAILY
Qty: 180 TAB | Refills: 1 | Status: SHIPPED | OUTPATIENT
Start: 2019-09-30 | End: 2020-08-31 | Stop reason: SDUPTHER

## 2019-09-30 RX ORDER — CLOPIDOGREL BISULFATE 75 MG/1
75 TABLET ORAL DAILY
Qty: 90 TAB | Refills: 1 | Status: SHIPPED | OUTPATIENT
Start: 2019-09-30 | End: 2019-12-12 | Stop reason: SDUPTHER

## 2019-09-30 RX ORDER — AMLODIPINE BESYLATE 2.5 MG/1
TABLET ORAL
Refills: 1 | COMMUNITY
Start: 2019-09-10 | End: 2019-09-30

## 2019-09-30 RX ORDER — MIDODRINE HYDROCHLORIDE 5 MG/1
TABLET ORAL
Refills: 3 | COMMUNITY
Start: 2019-08-21 | End: 2019-09-30

## 2019-09-30 NOTE — PROGRESS NOTES
Assessment and Plan    1. TIA (transient ischemic attack)  Remains on meds for possible TIA  - atorvastatin (LIPITOR) 40 mg tablet; Take 1 Tab by mouth daily. Dispense: 90 Tab; Refill: 1  - clopidogrel (PLAVIX) 75 mg tab; Take 1 Tab by mouth daily. Dispense: 90 Tab; Refill: 1    2. Anemia, unspecified type  Remains on iron BID  - ferrous sulfate 325 mg (65 mg iron) tablet; Take 1 Tab by mouth two (2) times a day. Dispense: 180 Tab; Refill: 1    3. Acquired hypothyroidism  Refilled all meds as she is changing pharmacies  - levothyroxine (SYNTHROID) 50 mcg tablet; TAKE 1 TABLET BY MOUTH ONCE DAILY  Dispense: 90 Tab; Refill: 1    4. Orthostatic hypotension  Currently on no meds. Discussed basic precautions for fall prevention when standing and after meals and bathroom visits    5. Essential hypertension  Normotensive off of meds. Diagnosis and plan discussed with patient who verbillized understanding. History of present Jamila Sheppard is a 80 y.o. female presenting for Medication Evaluation (Amloipine / Midodrine )    Sometimes sluggish, sometimes dizzy  Off of fluorinef and amlodipine as of last visit due to fall risk and was normotensive off of meds. Here with caretaker who says she is doing well. No falls. Eating well. Remains on meds as of last visit    Review of Systems   Respiratory: Negative for shortness of breath. Cardiovascular: Negative for chest pain. Gastrointestinal: Positive for constipation. Negative for abdominal pain, blood in stool, diarrhea and vomiting. Genitourinary: Negative for hematuria. Musculoskeletal: Negative for falls. Neurological: Positive for dizziness. Negative for loss of consciousness, weakness and headaches.          Past Medical History:   Diagnosis Date    Anemia     [facility]     [facility]     left breast    Cataract     Chronic renal failure     Colonic polyp     Dementia     Diabetes (HCC)     Eczema     Headache  Hyperlipemia     Hypothyroidism     Ill-defined condition     high cholesterol, hypothyroid    Osteopenia     Posterior vitreous detachment, left     Type 2 diabetes mellitus (HCC)     UTI (urinary tract infection)     Vitamin D deficiency      Past Surgical History:   Procedure Laterality Date    BREAST SURGERY PROCEDURE UNLISTED      left breast lumpectomy    HX APPENDECTOMY      HX CATARACT REMOVAL      HX COLONOSCOPY  2009    HX GYN      hysterectomy    HX MASTECTOMY  1992    S/P Left breast ,edial segmental mastectomy- 1992;    HX ORTHOPAEDIC      back     Family History   Problem Relation Age of Onset    No Known Problems Mother     No Known Problems Father      Social History     Socioeconomic History    Marital status:      Spouse name: Not on file    Number of children: Not on file    Years of education: Not on file    Highest education level: Not on file   Occupational History    Not on file   Social Needs    Financial resource strain: Not on file    Food insecurity:     Worry: Not on file     Inability: Not on file    Transportation needs:     Medical: Not on file     Non-medical: Not on file   Tobacco Use    Smoking status: Never Smoker    Smokeless tobacco: Never Used   Substance and Sexual Activity    Alcohol use: No    Drug use: No    Sexual activity: Never   Lifestyle    Physical activity:     Days per week: Not on file     Minutes per session: Not on file    Stress: Not on file   Relationships    Social connections:     Talks on phone: Not on file     Gets together: Not on file     Attends Spiritism service: Not on file     Active member of club or organization: Not on file     Attends meetings of clubs or organizations: Not on file     Relationship status: Not on file    Intimate partner violence:     Fear of current or ex partner: Not on file     Emotionally abused: Not on file     Physically abused: Not on file     Forced sexual activity: Not on file Other Topics Concern    Not on file   Social History Narrative    ** Merged History Encounter **              Current Outpatient Medications   Medication Sig Dispense Refill    atorvastatin (LIPITOR) 40 mg tablet Take 1 Tab by mouth daily. 90 Tab 1    clopidogrel (PLAVIX) 75 mg tab Take 1 Tab by mouth daily. 90 Tab 1    ferrous sulfate 325 mg (65 mg iron) tablet Take 1 Tab by mouth two (2) times a day. 180 Tab 1    levothyroxine (SYNTHROID) 50 mcg tablet TAKE 1 TABLET BY MOUTH ONCE DAILY 90 Tab 1    glucose blood VI test strips (TRUE METRIX GLUCOSE TEST STRIP) strip USE 1 STRIP TO CHECK GLUCOSE ONCE DAILY, DM2 E11.9 100 Strip 5    polyethylene glycol (MIRALAX) 17 gram packet Take 1 Packet by mouth daily. 30 Packet 0    varicella-zoster recombinant, PF, (SHINGRIX, PF,) 50 mcg/0.5 mL susr injection 0.5mL by IntraMUSCular route once now and then repeat in 2-6 months 0.5 mL 1    cholecalciferol, vitamin D3, (VITAMIN D3 PO) Take  by mouth.  butalbital-acetaminophen-caff (FIORICET) -40 mg per capsule Take 1 Cap by mouth every six (6) hours as needed for Pain. Max Daily Amount: 4 Caps. 20 Cap 0         No Known Allergies    Vitals:    09/30/19 1051   BP: 126/61   Pulse: 61   Resp: 16   Temp: 98.3 °F (36.8 °C)   TempSrc: Oral   SpO2: 96%   Weight: 121 lb (54.9 kg)   Height: 5' 5\" (1.651 m)     Body mass index is 20.14 kg/m². Objective  Physical Exam  General: Patient alert and oriented and in NAD, elderly, thin  Neck: No thyromegaly or cervical lymphadenopathy  Cardiovascular: Heart has regular rate and rhythm, No murmurs, rubs or gallops. No edema  Respiratory: Lungs are clear to auscultation bilaterally, no wheezing, rales or rhonchi, normal chest excursion and no increased work of breathing.   Gastorintestinal: abdomen is non-tender, non-distended, without organomegaly or masses  Genitourinary: exam deferred  Musculoskeletal: All four extremities present and functional.   Skin: No rashes or lesions noted on exposed skin  Neuro: AAOx3, normal gait and speech. No gross neurologic deficits. Psych: Appropriate mood and affect, no homicidal or suicidal ideation, no obsessions, delusions or hallucinations, normal psychomotor status.

## 2019-10-18 ENCOUNTER — TELEPHONE (OUTPATIENT)
Dept: FAMILY MEDICINE CLINIC | Age: 84
End: 2019-10-18

## 2019-10-18 DIAGNOSIS — G45.9 TIA (TRANSIENT ISCHEMIC ATTACK): ICD-10-CM

## 2019-10-18 RX ORDER — ATORVASTATIN CALCIUM 40 MG/1
40 TABLET, FILM COATED ORAL DAILY
Qty: 90 TAB | Refills: 1 | Status: SHIPPED | OUTPATIENT
Start: 2019-10-18 | End: 2019-12-12 | Stop reason: SDUPTHER

## 2019-10-18 NOTE — TELEPHONE ENCOUNTER
----- Message from Francisco Amaya sent at 10/18/2019  1:00 PM EDT -----  Regarding: /Refill   Pt advised she lost her new prescription for her cholesterol medication. She asked if the medicine can be called in to he pharmacy on file.     Best contact [phone]

## 2019-10-18 NOTE — TELEPHONE ENCOUNTER
10/18/19  2:45 PM    1. TIA (transient ischemic attack)  New rx sent in for lost rx.  - atorvastatin (LIPITOR) 40 mg tablet; Take 1 Tab by mouth daily. Dispense: 90 Tab;  Refill: 1      Saurav Ash MD

## 2019-12-09 DIAGNOSIS — E03.9 ACQUIRED HYPOTHYROIDISM: ICD-10-CM

## 2019-12-09 RX ORDER — LEVOTHYROXINE SODIUM 50 UG/1
TABLET ORAL
Qty: 90 TAB | Refills: 1 | Status: SHIPPED | OUTPATIENT
Start: 2019-12-09 | End: 2019-12-12 | Stop reason: SDUPTHER

## 2019-12-12 ENCOUNTER — OFFICE VISIT (OUTPATIENT)
Dept: FAMILY MEDICINE CLINIC | Age: 84
End: 2019-12-12

## 2019-12-12 VITALS
WEIGHT: 123.6 LBS | OXYGEN SATURATION: 100 % | HEART RATE: 65 BPM | HEIGHT: 65 IN | TEMPERATURE: 97.7 F | BODY MASS INDEX: 20.59 KG/M2 | RESPIRATION RATE: 16 BRPM | SYSTOLIC BLOOD PRESSURE: 101 MMHG | DIASTOLIC BLOOD PRESSURE: 57 MMHG

## 2019-12-12 DIAGNOSIS — F03.90 DEMENTIA WITHOUT BEHAVIORAL DISTURBANCE, UNSPECIFIED DEMENTIA TYPE: ICD-10-CM

## 2019-12-12 DIAGNOSIS — G45.9 TIA (TRANSIENT ISCHEMIC ATTACK): ICD-10-CM

## 2019-12-12 DIAGNOSIS — G40.909 SEIZURE DISORDER (HCC): Primary | ICD-10-CM

## 2019-12-12 DIAGNOSIS — E03.9 ACQUIRED HYPOTHYROIDISM: ICD-10-CM

## 2019-12-12 RX ORDER — CLOPIDOGREL BISULFATE 75 MG/1
75 TABLET ORAL DAILY
Qty: 90 TAB | Refills: 1 | Status: SHIPPED | OUTPATIENT
Start: 2019-12-12 | End: 2020-06-25

## 2019-12-12 RX ORDER — LEVOTHYROXINE SODIUM 50 UG/1
TABLET ORAL
Qty: 90 TAB | Refills: 1 | Status: SHIPPED | OUTPATIENT
Start: 2019-12-12 | End: 2020-08-21 | Stop reason: SDUPTHER

## 2019-12-12 RX ORDER — ATORVASTATIN CALCIUM 40 MG/1
40 TABLET, FILM COATED ORAL DAILY
Qty: 90 TAB | Refills: 1 | Status: SHIPPED | OUTPATIENT
Start: 2019-12-12 | End: 2020-06-25

## 2019-12-12 NOTE — PROGRESS NOTES
Assessment and Plan 1. Acquired hypothyroidism Continues thyroid replacement 
- levothyroxine (SYNTHROID) 50 mcg tablet; TAKE 1 TABLET BY MOUTH ONCE DAILY  Dispense: 90 Tab; Refill: 1 2. TIA (transient ischemic attack) No additional episodes. Normotensive off of medications. - atorvastatin (LIPITOR) 40 mg tablet; Take 1 Tab by mouth daily. Dispense: 90 Tab; Refill: 1 
- clopidogrel (PLAVIX) 75 mg tab; Take 1 Tab by mouth daily. Dispense: 90 Tab; Refill: 1 3. Seizure disorder ([address] Episodes do sound like they could possibly be seizures, Referral Dr. Katie Tobin for further evaluation 
- [address]  [place]. MMSE today 27/30 Misses year, short term recall and diagram drawing. Paperwork for home aide for insurance [facility] form) completed Follow-up and Dispositions · Return in about 3 months (around 3/12/2020) for Medication follow up. Diagnosis and plan discussed with patient who verbillized understanding. History of present Gordon Hopkins is a 80 y.o. female presenting for Other (Need forms filled ) and Medication Refill Patient here with family member FU of dementia. Needs form for insurance completed for home aide Stable, no bit changes in memory. No additional falls or sx of TIA Patient is concerned that she is having seizures Episode details. Several per week. Upper extremities shake. Last a minute or less. Not like previous low sugars in sulfonurea (stopped). History of orthostatic hypotension, not like that either Cannot move when happening. Cannot talk when they occur. Occurred once while walking into her home and fell against wall. No loss of bowel or bladder continence. No full LOC. Aware during episodes Last less than a minute. No aura. But, has abnormal feeling in her head before they occur. Has trouble describing this. Review of Systems Respiratory: Negative for shortness of breath. Cardiovascular: Negative for chest pain and palpitations. Gastrointestinal: Negative. Genitourinary: Negative. Neurological: Positive for seizures (?) and headaches. Negative for dizziness, tingling, tremors, speech change, focal weakness, loss of consciousness and weakness. Psychiatric/Behavioral: Positive for memory loss. Negative for depression. The patient has insomnia. The patient is not nervous/anxious. Past Medical History:  
Diagnosis Date  Anemia  [facility]  [facility])   
 left breast  
 Cataract  Chronic renal failure  Colonic polyp  Dementia ([facility]  Diabetes ([facility]  Eczema  Headache  Hyperlipemia  Hypothyroidism  Ill-defined condition   
 high cholesterol, hypothyroid  Osteopenia  Posterior vitreous detachment, left  Type 2 diabetes mellitus ([facility]  UTI (urinary tract infection)  Vitamin D deficiency Past Surgical History:  
Procedure Laterality Date  BREAST SURGERY PROCEDURE UNLISTED    
 left breast lumpectomy  HX APPENDECTOMY  HX CATARACT REMOVAL    
 HX COLONOSCOPY  2009  HX GYN    
 [id number] Left [facility] ,edial segmental mastectomy- 1992;  
 HX ORTHOPAEDIC    
 back Family History Problem Relation Age of Onset  No Known Problems Mother  No Known Problems Father Social History Socioeconomic History  Marital status:  Spouse name: Not on file  Number of children: Not on file  Years of education: Not on file  Highest education level: Not on file Occupational History  Not on file Social Needs  Financial resource strain: Not on file  Food insecurity:  
  Worry: Not on file Inability: Not on file  Transportation needs:  
  Medical: Not on file Non-medical: Not on file Tobacco Use  Smoking status: Never Smoker  Smokeless tobacco: Never Used Substance and Sexual Activity  Alcohol use: No  
 Drug use: No  
 Sexual activity: Not Currently Lifestyle  Physical activity:  
  Days per week: Not on file Minutes per session: Not on file  Stress: Not on file Relationships  Social connections:  
  Talks on phone: Not on file Gets together: Not on file Attends Scientologist service: Not on file Active member of club or organization: Not on file Attends meetings of clubs or organizations: Not on file Relationship status: Not on file  Intimate partner violence:  
  Fear of current or ex partner: Not on file Emotionally abused: Not on file Physically abused: Not on file Forced sexual activity: Not on file Other Topics Concern  Not on file Social History Narrative ** Merged History Encounter **  
    
 
 
 
Prior to Admission medications Medication Sig Start Date End Date Taking? Authorizing Provider  
levothyroxine (SYNTHROID) 50 mcg tablet TAKE 1 TABLET BY MOUTH ONCE DAILY 12/12/19  Yes Romulo Toledo MD  
atorvastatin (LIPITOR) 40 mg tablet Take 1 Tab by mouth daily. 12/12/19  Yes Romulo Toledo MD  
clopidogrel (PLAVIX) 75 mg tab Take 1 Tab by mouth daily. 12/12/19  Yes Romulo Toledo MD  
ferrous sulfate 325 mg (65 mg iron) tablet Take 1 Tab by mouth two (2) times a day. 9/30/19  Yes Romulo Toledo MD  
glucose blood VI test strips (TRUE METRIX GLUCOSE TEST STRIP) strip USE 1 STRIP TO CHECK GLUCOSE ONCE DAILY, DM2 E11.9 9/11/19  Yes Romulo Toledo MD  
polyethylene glycol (MIRALAX) 17 gram packet Take 1 Packet by mouth daily. 5/17/19  Yes Vandana Rea MD  
levothyroxine (SYNTHROID) 50 mcg tablet TAKE 1 TABLET BY MOUTH ONCE DAILY 12/9/19 12/12/19  Romulo Toledo MD  
atorvastatin (LIPITOR) 40 mg tablet Take 1 Tab by mouth daily. 10/18/19 12/12/19  Romulo Toledo MD  
clopidogrel (PLAVIX) 75 mg tab Take 1 Tab by mouth daily.  9/30/19 12/12/19  Romulo Toledo MD  
 varicella-zoster recombinant, PF, (SHINGRIX, PF,) 50 mcg/0.5 mL susr injection 0.5mL by IntraMUSCular route once now and then repeat in 2-6 months 4/17/19   Vamshi Lara MD  
cholecalciferol, vitamin D3, (VITAMIN D3 PO) Take  by mouth. Provider, Historical  
  
 
No Known Allergies Vitals:  
 12/12/19 1104 BP: 101/57 Pulse: 65 Resp: 16 Temp: 97.7 °F (36.5 °C) TempSrc: Oral  
SpO2: 100% Weight: 123 lb 9.6 oz (56.1 kg) Height: 5' 5\" (1.651 m) Body mass index is 20.57 kg/m². Objective Physical Exam 
Vitals signs and nursing note reviewed. Constitutional:   
   Appearance: Normal appearance. She is not toxic-appearing. HENT:  
   Head: Normocephalic and atraumatic. Neck: Musculoskeletal: No muscular tenderness. Cardiovascular:  
   Rate and Rhythm: Normal rate and regular rhythm. Heart sounds: Normal heart sounds. No murmur. No gallop. Pulmonary:  
   Effort: Pulmonary effort is normal. No respiratory distress. Breath sounds: Normal breath sounds. No wheezing, rhonchi or rales. Lymphadenopathy:  
   Cervical: No cervical adenopathy. Neurological:  
   Mental Status: She is alert and oriented to person, place, and time. Gait: Gait is intact. Comments: Normal speech Psychiatric:     
   Attention and Perception: Attention normal.     
   Mood and Affect: Mood and affect normal.     
   Speech: Speech normal.     
   Behavior: Behavior normal.     
   Thought Content: Thought content normal.     
   Cognition and Memory: Cognition is impaired. Memory is impaired. She exhibits impaired recent memory and impaired remote memory. Judgment: Judgment normal.  
 
 
General: Patient alert and oriented and in NAD Eyes: PER/EOMI, no conjunctival pallor or scleral icterus. ENT: Nares normal, TM's clear with normal architecture and light reflex, Mouth normal, throat without exudate, uvula midline Neck: No thyromegaly or cervical lymphadenopathy Cardiovascular: Heart has regular rate and rhythm, No murmurs, rubs or gallops. No edema Respiratory: Lungs are clear to auscultation bilaterally, no wheezing, rales or rhonchi, normal chest excursion and no increased work of breathing. Gastorintestinal: abdomen is non-tender, non-distended, without organomegaly or masses Genitourinary: exam deferred Musculoskeletal: All four extremities present and functional.  
Skin: No rashes or lesions noted on exposed skin Neuro: AAOx3, normal gait and speech. No gross neurologic deficits. Psych: Appropriate mood and affect, no homicidal or suicidal ideation, no obsessions, delusions or hallucinations, normal psychomotor status.

## 2019-12-12 NOTE — PROGRESS NOTES
Identified pt with two pt identifiers(name and ). Reviewed record in preparation for visit and have obtained necessary documentation. Chief Complaint Patient presents with  
 Other Need forms filled  Medication Refill Health Maintenance Due Topic  FOOT EXAM Q1   
 EYE EXAM RETINAL OR DILATED  Bone Densitometry (Dexa) Screening  MICROALBUMIN Q1   
 Shingrix Vaccine Age 50> (2 of 2)  Influenza Age 5 to Adult  HEMOGLOBIN A1C Q6M Coordination of Care Questionnaire: 
:  
1) Have you been to an emergency room, urgent care, or hospitalized since your last visit? If yes, where when, and reason for visit? No 
  
 
2. Have seen or consulted any other health care provider since your last visit? If yes, where when, and reason for visit?   No

## 2019-12-12 NOTE — Clinical Note
Elderly patient to see you with possible seizures. Has mild dementia, previous episode thought to be TIA. MRI and CT within past year in [facility]. Appreciate your opinion on what episode represent. Thanks.

## 2019-12-16 ENCOUNTER — TELEPHONE (OUTPATIENT)
Dept: FAMILY MEDICINE CLINIC | Age: 84
End: 2019-12-16

## 2020-03-20 ENCOUNTER — TELEPHONE (OUTPATIENT)
Dept: FAMILY MEDICINE CLINIC | Age: 85
End: 2020-03-20

## 2020-03-20 NOTE — TELEPHONE ENCOUNTER
RC- to Mrs. Angelika Alvarez ( x 2 identifiers ) patient is refusing to go to the Er due to the virus.  She would like to monitor her symptoms at home and if things get worst she will then go to the ER

## 2020-03-20 NOTE — TELEPHONE ENCOUNTER
Pt states that she cant walk (started yesterday) left leg went limp and her right arm would just drop.     Today she states she can only follow through with one step     Blood veins in her hands and arms were very tight last night   Not like that today     No Numbness, or pain    Looking for a call back for the doctor or nurse

## 2020-03-23 ENCOUNTER — TELEPHONE (OUTPATIENT)
Dept: FAMILY MEDICINE CLINIC | Age: 85
End: 2020-03-23

## 2020-03-23 NOTE — TELEPHONE ENCOUNTER
Wobbly when walking. Feels unsteady. Strength is not good. Has trouble getting across the floor  No pain. Has chairs placed strategically. If weakness persists especially arm and leg on same side, needs to be seen preferably in ER.   She agrees  [facility]

## 2020-03-31 DIAGNOSIS — G45.9 TIA (TRANSIENT ISCHEMIC ATTACK): Primary | ICD-10-CM

## 2020-04-03 ENCOUNTER — VIRTUAL VISIT (OUTPATIENT)
Dept: FAMILY MEDICINE CLINIC | Age: 85
End: 2020-04-03

## 2020-04-03 ENCOUNTER — HOME HEALTH ADMISSION (OUTPATIENT)
Dept: HOME HEALTH SERVICES | Facility: HOME HEALTH | Age: 85
End: 2020-04-03
Payer: MEDICARE

## 2020-04-03 VITALS — BODY MASS INDEX: 20.49 KG/M2 | HEIGHT: 65 IN | WEIGHT: 123 LBS

## 2020-04-03 DIAGNOSIS — I95.1 ORTHOSTATIC HYPOTENSION: Primary | ICD-10-CM

## 2020-04-03 DIAGNOSIS — G45.9 TIA (TRANSIENT ISCHEMIC ATTACK): ICD-10-CM

## 2020-04-03 NOTE — PROGRESS NOTES
Consent: Ramon Angeles, who was seen by synchronous (real-time) audio-video technology, and/or her healthcare decision maker, is aware that this patient-initiated, Telehealth encounter on 4/3/2020 is a billable service, with coverage as determined by her insurance carrier. She is aware that she may receive a bill and has provided verbal consent to proceed: Yes. Difficult visit due to issues with technology. Four separate video calls and finished with phone call to patient and Mrs. Romero. Most of visit done with video. Assessment & Plan:  
Diagnoses and all orders for this visit: 
 
1. Orthostatic hypotension 
-     REFERRAL TO HOME HEALTH Does not sound like these episodes are seizures although that remains a possibility. Will try [place] and PT OT for fall prevention. Observation of these episodes and BP/HR when they occur 2. TIA (transient ischemic attack) Stable continue current meds. Follow-up and Dispositions · Return in about 1 month (around 5/3/2020) for In office after COVID quarantine lifted. Basilia Irizarry I spent at least 25 minutes with this established patient, and >50% of the time was spent counseling and/or coordinating care regarding orthostatic hypotension [address] Subjective:  
Ramon Angeles is a 80 y.o. female who was seen for Other (home health assiitant face to face visit) Prior to Admission medications Medication Sig Start Date End Date Taking? Authorizing Provider  
levothyroxine (SYNTHROID) 50 mcg tablet TAKE 1 TABLET BY MOUTH ONCE DAILY 12/12/19  Yes Trinda Boxer, MD  
atorvastatin (LIPITOR) 40 mg tablet Take 1 Tab by mouth daily. 12/12/19  Yes Trinda Boxer, MD  
clopidogrel (PLAVIX) 75 mg tab Take 1 Tab by mouth daily. 12/12/19  Yes Trinda Boxer, MD  
ferrous sulfate 325 mg (65 mg iron) tablet Take 1 Tab by mouth two (2) times a day.  9/30/19  Yes Trinda Boxer, MD  
glucose blood VI test strips (TRUE METRIX GLUCOSE TEST STRIP) strip USE 1 STRIP TO CHECK GLUCOSE ONCE DAILY, DM2 E11.9 9/11/19  Yes Ke Palacios MD  
polyethylene glycol (MIRALAX) 17 gram packet Take 1 Packet by mouth daily. 5/17/19  Yes Vandana Rea MD  
cholecalciferol, vitamin D3, (VITAMIN D3 PO) Take  by mouth. Yes Provider, Historical  
varicella-zoster recombinant, PF, (SHINGRIX, PF,) 50 mcg/0.5 mL susr injection 0.5mL by IntraMUSCular route once now and then repeat in 2-6 months 4/17/19   Ke Palacios MD  
 
No Known Allergies Video visit today for near falls and leg shaking. Patient is intermittently weak and nearly falls. Has to walk in house holding on to furniture. Intermittently has sense that left leg and arm are weak and shaking and has to sit down. It passes with rest. 
No LOC No vertigo. No change in speech. Denies HA and speech problems. Vision in left eye is declining. Occurs nearly daily. Her neighbor, Maninder Rob, helps care for her and says it does not appear to be a seizure ( she is a nurse) Taking regular meds consistently. Review of Systems Constitutional: Negative for chills and fever. Respiratory: Negative for shortness of breath. Cardiovascular: Negative for chest pain, palpitations and leg swelling. Gastrointestinal: Negative. Genitourinary: Negative. Neurological: Positive for dizziness, focal weakness and weakness. Negative for sensory change, speech change, seizures, loss of consciousness and headaches. Psychiatric/Behavioral: Positive for memory loss. Objective:  
Vital Signs: (As obtained by patient/caregiver at home) Visit Vitals Ht 5' 5\" (1.651 m) Wt 123 lb (55.8 kg) LMP  (LMP Unknown) BMI 20.47 kg/m² [INSTRUCTIONS:  \"[x]\" Indicates a positive item  \"[]\" Indicates a negative item  -- DELETE ALL ITEMS NOT EXAMINED] Constitutional: [x] Appears well-developed and well-nourished [x] No apparent distress   
  [] Abnormal -  
 
 Mental status: [x] Alert and awake  [x] Oriented to person/place/time [x] Able to follow commands   
[] Abnormal - Eyes:   EOM    [x]  Normal    [] Abnormal -  
Sclera  [x]  Normal    [] Abnormal - 
        Discharge [x]  None visible   [] Abnormal - HENT: [x] Normocephalic, atraumatic  [] Abnormal -  
[x] Mouth/Throat: Mucous membranes are moist 
 
External Ears [x] Normal  [] Abnormal - Neck: [x] No visualized mass [] Abnormal - Pulmonary/Chest: [x] Respiratory effort normal   [x] No visualized signs of difficulty breathing or respiratory distress 
      [] Abnormal - Musculoskeletal:   [x] Normal gait with no signs of ataxia [x] Normal range of motion of neck [] Abnormal -  
 
Neurological:        [x] No Facial Asymmetry (Cranial nerve 7 motor function) (limited exam due to video visit) [x] No gaze palsy  
     [] Abnormal -   
     
Skin:        [x] No significant exanthematous lesions or discoloration noted on facial skin   
     [] Abnormal - Psychiatric:       [x] Normal Affect [] Abnormal -  
     [x] No Hallucinations Other pertinent observable physical exam findings:- 
 
 
 
We discussed the expected course, resolution and complications of the diagnosis(es) in detail. Medication risks, benefits, costs, interactions, and alternatives were discussed as indicated. I advised her to contact the office if her condition worsens, changes or fails to improve as anticipated. She expressed understanding with the diagnosis(es) and plan. Mirian Villatoro is a 80 y.o. female being evaluated by a video visit encounter for concerns as above. A caregiver was present when appropriate. Due to this being a TeleHealth encounter (During [place] [address] public health emergency), evaluation of the following organ systems was limited: Vitals/Constitutional/EENT/Resp/CV/GI//MS/Neuro/Skin/Heme-Lymph-Imm.  
Pursuant to the emergency declaration under the [address] and the National Emergencies Act, [facility] waiver authority and the [employer] Act, this Virtual  Visit was conducted, with patient's (and/or legal guardian's) consent, to reduce the patient's risk of exposure to COVID-19 and provide necessary medical care. Services were provided through a video synchronous discussion virtually to substitute for in-person clinic visit. Patient was at home and I was at office.  
 
 
 
David Cristobal MD

## 2020-04-03 NOTE — PROGRESS NOTES
Identified pt with two pt identifiers(name and ). Reviewed record in preparation for visit and have obtained necessary documentation. Chief Complaint Patient presents with  
 Other  
  home health assiitant face to face visit Health Maintenance Due Topic  Foot Exam Q1   
 Eye Exam Retinal or Dilated  Bone Densitometry (Dexa) Screening  MICROALBUMIN Q1   
 Shingrix Vaccine Age 50> (2 of 2)  Medicare Yearly Exam   
 
 
Visit Vitals Height 5' 5\" (1.651 m) Weight 123 lb (55.8 kg) Body Mass Index 20.47 kg/m² Coordination of Care Questionnaire: 
:  
1) Have you been to an emergency room, urgent care, or hospitalized since your last visit?  no 
 
 
2. Have seen or consulted any other health care provider since your last visit?  
no 
 
 
 
 
Patient is accompanied by self  I have received verbal consent from Effie Garcia to discuss any/all medical information while they are present in the room.

## 2020-04-04 ENCOUNTER — HOME CARE VISIT (OUTPATIENT)
Dept: HOME HEALTH SERVICES | Facility: HOME HEALTH | Age: 85
End: 2020-04-04
Payer: MEDICARE

## 2020-04-04 ENCOUNTER — HOME CARE VISIT (OUTPATIENT)
Dept: SCHEDULING | Facility: HOME HEALTH | Age: 85
End: 2020-04-04
Payer: MEDICARE

## 2020-04-04 PROCEDURE — 3331090001 HH PPS REVENUE CREDIT

## 2020-04-04 PROCEDURE — 3331090002 HH PPS REVENUE DEBIT

## 2020-04-04 PROCEDURE — 400013 HH SOC

## 2020-04-04 PROCEDURE — G0151 HHCP-SERV OF PT,EA 15 MIN: HCPCS

## 2020-04-05 VITALS
SYSTOLIC BLOOD PRESSURE: 118 MMHG | RESPIRATION RATE: 16 BRPM | OXYGEN SATURATION: 98 % | HEART RATE: 68 BPM | DIASTOLIC BLOOD PRESSURE: 60 MMHG | TEMPERATURE: 98.4 F

## 2020-04-05 PROCEDURE — 3331090001 HH PPS REVENUE CREDIT

## 2020-04-05 PROCEDURE — 3331090002 HH PPS REVENUE DEBIT

## 2020-04-06 ENCOUNTER — HOME CARE VISIT (OUTPATIENT)
Dept: HOME HEALTH SERVICES | Facility: HOME HEALTH | Age: 85
End: 2020-04-06
Payer: MEDICARE

## 2020-04-06 PROCEDURE — 3331090002 HH PPS REVENUE DEBIT

## 2020-04-06 PROCEDURE — 3331090001 HH PPS REVENUE CREDIT

## 2020-04-07 ENCOUNTER — HOME CARE VISIT (OUTPATIENT)
Dept: SCHEDULING | Facility: HOME HEALTH | Age: 85
End: 2020-04-07
Payer: MEDICARE

## 2020-04-07 PROCEDURE — G0157 HHC PT ASSISTANT EA 15: HCPCS

## 2020-04-07 PROCEDURE — 3331090002 HH PPS REVENUE DEBIT

## 2020-04-07 PROCEDURE — 3331090001 HH PPS REVENUE CREDIT

## 2020-04-08 ENCOUNTER — HOME CARE VISIT (OUTPATIENT)
Dept: HOME HEALTH SERVICES | Facility: HOME HEALTH | Age: 85
End: 2020-04-08
Payer: MEDICARE

## 2020-04-08 ENCOUNTER — HOME CARE VISIT (OUTPATIENT)
Dept: SCHEDULING | Facility: HOME HEALTH | Age: 85
End: 2020-04-08
Payer: MEDICARE

## 2020-04-08 VITALS
TEMPERATURE: 98 F | OXYGEN SATURATION: 97 % | HEART RATE: 68 BPM | DIASTOLIC BLOOD PRESSURE: 60 MMHG | SYSTOLIC BLOOD PRESSURE: 126 MMHG

## 2020-04-08 VITALS
TEMPERATURE: 98.1 F | DIASTOLIC BLOOD PRESSURE: 55 MMHG | SYSTOLIC BLOOD PRESSURE: 155 MMHG | OXYGEN SATURATION: 99 % | RESPIRATION RATE: 18 BRPM | HEART RATE: 72 BPM

## 2020-04-08 PROCEDURE — G0152 HHCP-SERV OF OT,EA 15 MIN: HCPCS

## 2020-04-08 PROCEDURE — 3331090002 HH PPS REVENUE DEBIT

## 2020-04-08 PROCEDURE — 3331090001 HH PPS REVENUE CREDIT

## 2020-04-09 ENCOUNTER — HOME CARE VISIT (OUTPATIENT)
Dept: HOME HEALTH SERVICES | Facility: HOME HEALTH | Age: 85
End: 2020-04-09
Payer: MEDICARE

## 2020-04-09 ENCOUNTER — HOME CARE VISIT (OUTPATIENT)
Dept: SCHEDULING | Facility: HOME HEALTH | Age: 85
End: 2020-04-09
Payer: MEDICARE

## 2020-04-09 PROCEDURE — 3331090001 HH PPS REVENUE CREDIT

## 2020-04-09 PROCEDURE — G0157 HHC PT ASSISTANT EA 15: HCPCS

## 2020-04-09 PROCEDURE — G0299 HHS/HOSPICE OF RN EA 15 MIN: HCPCS

## 2020-04-09 PROCEDURE — 3331090002 HH PPS REVENUE DEBIT

## 2020-04-10 ENCOUNTER — HOME CARE VISIT (OUTPATIENT)
Dept: HOME HEALTH SERVICES | Facility: HOME HEALTH | Age: 85
End: 2020-04-10
Payer: MEDICARE

## 2020-04-10 ENCOUNTER — TELEPHONE (OUTPATIENT)
Dept: FAMILY MEDICINE CLINIC | Age: 85
End: 2020-04-10

## 2020-04-10 VITALS
DIASTOLIC BLOOD PRESSURE: 46 MMHG | OXYGEN SATURATION: 97 % | HEART RATE: 88 BPM | TEMPERATURE: 97.5 F | SYSTOLIC BLOOD PRESSURE: 94 MMHG

## 2020-04-10 PROCEDURE — G0155 HHCP-SVS OF CSW,EA 15 MIN: HCPCS

## 2020-04-10 PROCEDURE — 3331090002 HH PPS REVENUE DEBIT

## 2020-04-10 PROCEDURE — 3331090001 HH PPS REVENUE CREDIT

## 2020-04-10 NOTE — TELEPHONE ENCOUNTER
BRANDYN- from [place]- she will call Mrs. Trish Urban with DR Nadir Alcantar message and fax over orders to faxed

## 2020-04-10 NOTE — TELEPHONE ENCOUNTER
[address] [phone]   evaluated patient  BP readings   115/48- when pt was sitting  102/45 - pt was standing  5 min later  88/44 standing-  Episodes where she gets pain at the base of her skull and gets very weak- is scared of falling  Norvasac is effecting her blood pressure  Can we change meds?  Taking 2.5 mg

## 2020-04-10 NOTE — TELEPHONE ENCOUNTER
Call and have them DC the amlodipine. Keep tracking the BP including orthostatics.   [facility]

## 2020-04-11 PROCEDURE — 3331090002 HH PPS REVENUE DEBIT

## 2020-04-11 PROCEDURE — 3331090001 HH PPS REVENUE CREDIT

## 2020-04-12 VITALS — TEMPERATURE: 97.5 F | DIASTOLIC BLOOD PRESSURE: 44 MMHG | HEART RATE: 88 BPM | SYSTOLIC BLOOD PRESSURE: 88 MMHG

## 2020-04-12 PROCEDURE — 3331090002 HH PPS REVENUE DEBIT

## 2020-04-12 PROCEDURE — 3331090001 HH PPS REVENUE CREDIT

## 2020-04-13 ENCOUNTER — HOME CARE VISIT (OUTPATIENT)
Dept: HOME HEALTH SERVICES | Facility: HOME HEALTH | Age: 85
End: 2020-04-13
Payer: MEDICARE

## 2020-04-13 PROCEDURE — 3331090002 HH PPS REVENUE DEBIT

## 2020-04-13 PROCEDURE — 3331090001 HH PPS REVENUE CREDIT

## 2020-04-14 ENCOUNTER — HOME CARE VISIT (OUTPATIENT)
Dept: SCHEDULING | Facility: HOME HEALTH | Age: 85
End: 2020-04-14
Payer: MEDICARE

## 2020-04-14 ENCOUNTER — HOME CARE VISIT (OUTPATIENT)
Dept: HOME HEALTH SERVICES | Facility: HOME HEALTH | Age: 85
End: 2020-04-14
Payer: MEDICARE

## 2020-04-14 PROCEDURE — 3331090002 HH PPS REVENUE DEBIT

## 2020-04-14 PROCEDURE — G0300 HHS/HOSPICE OF LPN EA 15 MIN: HCPCS

## 2020-04-14 PROCEDURE — 3331090001 HH PPS REVENUE CREDIT

## 2020-04-15 ENCOUNTER — HOME CARE VISIT (OUTPATIENT)
Dept: HOME HEALTH SERVICES | Facility: HOME HEALTH | Age: 85
End: 2020-04-15
Payer: MEDICARE

## 2020-04-15 VITALS
TEMPERATURE: 98.2 F | DIASTOLIC BLOOD PRESSURE: 78 MMHG | HEART RATE: 77 BPM | OXYGEN SATURATION: 98 % | SYSTOLIC BLOOD PRESSURE: 110 MMHG | RESPIRATION RATE: 17 BRPM

## 2020-04-15 PROCEDURE — 3331090001 HH PPS REVENUE CREDIT

## 2020-04-15 PROCEDURE — 3331090002 HH PPS REVENUE DEBIT

## 2020-04-16 ENCOUNTER — HOME CARE VISIT (OUTPATIENT)
Dept: HOME HEALTH SERVICES | Facility: HOME HEALTH | Age: 85
End: 2020-04-16
Payer: MEDICARE

## 2020-04-16 ENCOUNTER — HOME CARE VISIT (OUTPATIENT)
Dept: SCHEDULING | Facility: HOME HEALTH | Age: 85
End: 2020-04-16
Payer: MEDICARE

## 2020-04-16 VITALS — OXYGEN SATURATION: 97 % | TEMPERATURE: 97.7 F | HEART RATE: 62 BPM

## 2020-04-16 PROCEDURE — 3331090001 HH PPS REVENUE CREDIT

## 2020-04-16 PROCEDURE — 3331090002 HH PPS REVENUE DEBIT

## 2020-04-16 PROCEDURE — G0152 HHCP-SERV OF OT,EA 15 MIN: HCPCS

## 2020-04-17 ENCOUNTER — HOME CARE VISIT (OUTPATIENT)
Dept: SCHEDULING | Facility: HOME HEALTH | Age: 85
End: 2020-04-17
Payer: MEDICARE

## 2020-04-17 ENCOUNTER — HOME CARE VISIT (OUTPATIENT)
Dept: HOME HEALTH SERVICES | Facility: HOME HEALTH | Age: 85
End: 2020-04-17
Payer: MEDICARE

## 2020-04-17 PROCEDURE — 3331090001 HH PPS REVENUE CREDIT

## 2020-04-17 PROCEDURE — 3331090002 HH PPS REVENUE DEBIT

## 2020-04-17 PROCEDURE — G0299 HHS/HOSPICE OF RN EA 15 MIN: HCPCS

## 2020-04-18 VITALS
HEART RATE: 75 BPM | SYSTOLIC BLOOD PRESSURE: 105 MMHG | TEMPERATURE: 98.3 F | DIASTOLIC BLOOD PRESSURE: 62 MMHG | OXYGEN SATURATION: 97 % | RESPIRATION RATE: 16 BRPM

## 2020-04-18 PROCEDURE — 3331090001 HH PPS REVENUE CREDIT

## 2020-04-18 PROCEDURE — 3331090002 HH PPS REVENUE DEBIT

## 2020-04-19 PROCEDURE — 3331090001 HH PPS REVENUE CREDIT

## 2020-04-19 PROCEDURE — 3331090002 HH PPS REVENUE DEBIT

## 2020-04-20 PROCEDURE — 3331090002 HH PPS REVENUE DEBIT

## 2020-04-20 PROCEDURE — 3331090001 HH PPS REVENUE CREDIT

## 2020-04-21 ENCOUNTER — HOME CARE VISIT (OUTPATIENT)
Dept: SCHEDULING | Facility: HOME HEALTH | Age: 85
End: 2020-04-21
Payer: MEDICARE

## 2020-04-21 PROCEDURE — 3331090001 HH PPS REVENUE CREDIT

## 2020-04-21 PROCEDURE — 3331090002 HH PPS REVENUE DEBIT

## 2020-04-22 ENCOUNTER — HOME CARE VISIT (OUTPATIENT)
Dept: HOME HEALTH SERVICES | Facility: HOME HEALTH | Age: 85
End: 2020-04-22
Payer: MEDICARE

## 2020-04-22 PROCEDURE — 3331090002 HH PPS REVENUE DEBIT

## 2020-04-22 PROCEDURE — 3331090001 HH PPS REVENUE CREDIT

## 2020-04-23 ENCOUNTER — HOME CARE VISIT (OUTPATIENT)
Dept: SCHEDULING | Facility: HOME HEALTH | Age: 85
End: 2020-04-23
Payer: MEDICARE

## 2020-04-23 ENCOUNTER — HOME CARE VISIT (OUTPATIENT)
Dept: HOME HEALTH SERVICES | Facility: HOME HEALTH | Age: 85
End: 2020-04-23
Payer: MEDICARE

## 2020-04-23 ENCOUNTER — TELEPHONE (OUTPATIENT)
Dept: FAMILY MEDICINE CLINIC | Age: 85
End: 2020-04-23

## 2020-04-23 PROCEDURE — 3331090002 HH PPS REVENUE DEBIT

## 2020-04-23 PROCEDURE — 3331090001 HH PPS REVENUE CREDIT

## 2020-04-23 NOTE — TELEPHONE ENCOUNTER
RC from  Mrs. Malik Irizarry caregiver-  Has some question about Mrs. Alicea medication     Looks like Mrs. Malik Irizarry should still be taking the Plavix can not find any notes of d/c medication ? Questioning thyroid medication, again can not find any notes of being stopped  ? The caregiver Salvatore Talavera ) will call back tomorrow, when she get to Mrs. Alicea home to go over medication

## 2020-04-23 NOTE — TELEPHONE ENCOUNTER
----- Message from [place] sent at 4/23/2020 12:46 PM EDT -----  Regarding: Dr. Stanislaw Schmid, Caregiver, is requesting a call and is stating that as she was getting pt's medications together for her to take, she mentioned a blood thinner pill was missing but Antoni Wilson is unaware of a blood thinner pill. Antoni Wilson would like to confirm if pt is currently taking blood thinner medication. Best contact number [phone].

## 2020-04-23 NOTE — TELEPHONE ENCOUNTER
I have called Mrs. Lovie Lanes back to talk to her caregiver Juan Post, per Mrs. Lovie Lanes she step out for a few, was told to call back to discuss medication

## 2020-04-24 ENCOUNTER — HOME CARE VISIT (OUTPATIENT)
Dept: SCHEDULING | Facility: HOME HEALTH | Age: 85
End: 2020-04-24
Payer: MEDICARE

## 2020-04-24 PROCEDURE — 3331090001 HH PPS REVENUE CREDIT

## 2020-04-24 PROCEDURE — G0300 HHS/HOSPICE OF LPN EA 15 MIN: HCPCS

## 2020-04-24 PROCEDURE — G0151 HHCP-SERV OF PT,EA 15 MIN: HCPCS

## 2020-04-24 PROCEDURE — 3331090002 HH PPS REVENUE DEBIT

## 2020-04-24 NOTE — TELEPHONE ENCOUNTER
SHIRA from [employer] [facility]- she states that Mrs. Navin Hercules is having low bp's again, she was having a hard  time standing up. Sitting bp was 70/40 , after drinking some orange juice bp was 90/50 then standing it dropped to 80/40. Per Dr. Dayday Tavarez Mrs. Navin Hercules has been having recurrent orthostatic    hypotension     Also, jose g states Mrs. Navin Hercules has been refusing care from [facility]/ OT/ PT. As far as , home health this is done through another company not [employer]. Shiv Jaramillo was told to tell Mrs. Navin Hercules Per Dr. Dayday Tavarez that if her symptoms don't any better she will need to be seen at one of the [place] clinic and to monitor her bp daily.

## 2020-04-25 VITALS
RESPIRATION RATE: 16 BRPM | SYSTOLIC BLOOD PRESSURE: 70 MMHG | HEART RATE: 76 BPM | DIASTOLIC BLOOD PRESSURE: 40 MMHG | TEMPERATURE: 97.3 F

## 2020-04-25 PROCEDURE — 3331090002 HH PPS REVENUE DEBIT

## 2020-04-25 PROCEDURE — 3331090001 HH PPS REVENUE CREDIT

## 2020-04-26 VITALS
HEART RATE: 71 BPM | TEMPERATURE: 97.8 F | SYSTOLIC BLOOD PRESSURE: 106 MMHG | DIASTOLIC BLOOD PRESSURE: 66 MMHG | OXYGEN SATURATION: 97 % | RESPIRATION RATE: 16 BRPM

## 2020-04-26 PROCEDURE — 3331090001 HH PPS REVENUE CREDIT

## 2020-04-26 PROCEDURE — 3331090002 HH PPS REVENUE DEBIT

## 2020-04-27 ENCOUNTER — HOME CARE VISIT (OUTPATIENT)
Dept: SCHEDULING | Facility: HOME HEALTH | Age: 85
End: 2020-04-27
Payer: MEDICARE

## 2020-04-27 ENCOUNTER — TELEPHONE (OUTPATIENT)
Dept: FAMILY MEDICINE CLINIC | Age: 85
End: 2020-04-27

## 2020-04-27 VITALS
SYSTOLIC BLOOD PRESSURE: 70 MMHG | DIASTOLIC BLOOD PRESSURE: 42 MMHG | TEMPERATURE: 98 F | OXYGEN SATURATION: 99 % | HEART RATE: 69 BPM | RESPIRATION RATE: 16 BRPM

## 2020-04-27 PROCEDURE — 3331090002 HH PPS REVENUE DEBIT

## 2020-04-27 PROCEDURE — G0300 HHS/HOSPICE OF LPN EA 15 MIN: HCPCS

## 2020-04-27 PROCEDURE — 3331090001 HH PPS REVENUE CREDIT

## 2020-04-27 PROCEDURE — G0151 HHCP-SERV OF PT,EA 15 MIN: HCPCS

## 2020-04-27 NOTE — TELEPHONE ENCOUNTER
Spoke with Rubia ([facility]) pt seems to not understand why [facility] nurse has called and she will reach out to family to update our records on who care for pt and can schedule appointments.

## 2020-04-27 NOTE — TELEPHONE ENCOUNTER
Pt had a fall, no injury last night in the bathroom 120/50 sitting BP  It dropped to 70/30- standing up  They need a manual wheelchair sot hat when she is having bad reactions she can safely get around her home instead of having to walk  [address]  [phone]   VM is confidential ok to leave VM  Wheel chair order will need to be sent to a [address] [place] [address]

## 2020-04-27 NOTE — TELEPHONE ENCOUNTER
[facility]  nurse called to help set up [address] for pt. Some one will be with her to assist at upcoming appt pt will call and schedule.      She also wanted to report todays bp readings 121/60 sitting and 70/42 standing- FYI only

## 2020-04-28 ENCOUNTER — VIRTUAL VISIT (OUTPATIENT)
Dept: FAMILY MEDICINE CLINIC | Age: 85
End: 2020-04-28

## 2020-04-28 ENCOUNTER — DOCUMENTATION ONLY (OUTPATIENT)
Dept: FAMILY MEDICINE CLINIC | Age: 85
End: 2020-04-28

## 2020-04-28 VITALS
RESPIRATION RATE: 16 BRPM | DIASTOLIC BLOOD PRESSURE: 50 MMHG | HEART RATE: 66 BPM | SYSTOLIC BLOOD PRESSURE: 120 MMHG | OXYGEN SATURATION: 96 % | TEMPERATURE: 98 F

## 2020-04-28 VITALS — HEIGHT: 65 IN | BODY MASS INDEX: 19.99 KG/M2 | WEIGHT: 120 LBS

## 2020-04-28 DIAGNOSIS — I95.1 ORTHOSTATIC HYPOTENSION: Primary | ICD-10-CM

## 2020-04-28 PROCEDURE — 3331090001 HH PPS REVENUE CREDIT

## 2020-04-28 PROCEDURE — 3331090002 HH PPS REVENUE DEBIT

## 2020-04-28 RX ORDER — FLUDROCORTISONE ACETATE 0.1 MG/1
0.2 TABLET ORAL DAILY
Qty: 60 TAB | Refills: 2 | Status: SHIPPED | OUTPATIENT
Start: 2020-04-28 | End: 2020-07-07 | Stop reason: SDUPTHER

## 2020-04-28 NOTE — LETTER
4/28/2020 12:17 PM 
 
Ms. Perez Sera [id number] 
[address] [phone] Please supply with light wheelchair for in home use. Diagnosis:  Orthostatic hypotension I 95.1 Sincerely, Spring Simpson MD

## 2020-04-28 NOTE — PROGRESS NOTES
Xavier Garrett is a 80 y.o. female Chief Complaint Patient presents with  Hypotension  Fall  Other Need order for wheelchair 1. Have you been to the ER, urgent care clinic since your last visit? Hospitalized since your last visit? No 
 
 
2. Have you seen or consulted any other health care providers outside of the [address] [place] [address] since your last visit? Include any pap smears or colon screening.   No

## 2020-04-28 NOTE — PROGRESS NOTES
LM- for Laureen Rivas [facility]) need to get a fax number to fax over Mrs. Christofer Aguilar order for wheelchair

## 2020-04-28 NOTE — PROGRESS NOTES
Jarrett Bueno is a 80 y.o. female who was seen by synchronous (real-time) audio-video technology on 4/28/2020. Consent: Jarrett Bueno, who was seen by synchronous (real-time) audio-video technology, and/or her healthcare decision maker, is aware that this patient-initiated, Telehealth encounter on 4/28/2020 is a billable service, with coverage as determined by her insurance carrier. She is aware that she may receive a bill and has provided verbal consent to proceed: Yes. Assessment & Plan:  
Diagnoses and all orders for this visit: 
 
1. Orthostatic hypotension 
-     fludrocortisone (FLORINEF) 0.1 mg tablet; Take 2 Tabs by mouth daily. Ongoing risk of falls seems to be the biggest risk for patient at this time. A hip fall and related fracture could be a terminal event for her. She has had no hypertension noted and her BP's on multiple occasions have been shown to be orthostatic. Therefore I have restarted florinef which she tolerated well in the past.  I have also written rx for her for wheelchair to use in home. Family is trying to arrange home care since patient is often by herself. See me when clinic reopens or if she has additional problems. I spent at least 23 minutes on this visit with this established patient. ([id number]) Subjective:  
Jarrett Bueno is a 80 y.o. female who was seen for Hypotension; Fall; and Other (Need order for wheelchair ) Ongoing hypotension noted on multiple measurements NO hypertension One fall. Nursing suggesting wheelchairs. Patient often alone. No other new sx Patient notes she is dizzy standing, her \"seizures' as she calls them. Nurses have shown these to be hypotension episodes. No actual observed seizures. Prior to Admission medications Medication Sig Start Date End Date Taking? Authorizing Provider  
fludrocortisone (FLORINEF) 0.1 mg tablet Take 2 Tabs by mouth daily.  4/28/20  Yes Thea Bennett MD  
 docusate sodium (COLACE) 100 mg capsule Take 100 mg by mouth two (2) times a day. Yes Boy Sparrow MD  
levothyroxine (SYNTHROID) 50 mcg tablet TAKE 1 TABLET BY MOUTH ONCE DAILY 12/12/19  Yes Boy Sparrow MD  
atorvastatin (LIPITOR) 40 mg tablet Take 1 Tab by mouth daily. 12/12/19  Yes Boy Sparrow MD  
clopidogrel (PLAVIX) 75 mg tab Take 1 Tab by mouth daily. 12/12/19  Yes Boy Sparrow MD  
ferrous sulfate 325 mg (65 mg iron) tablet Take 1 Tab by mouth two (2) times a day. 9/30/19  Yes Boy Sparrow MD  
amLODIPine (NORVASC) 2.5 mg tablet Take 2.5 mg by mouth daily. 4/28/20  Boy Sparrow MD  
glucose blood VI test strips (TRUE METRIX GLUCOSE TEST STRIP) strip USE 1 STRIP TO CHECK GLUCOSE ONCE DAILY, DM2 E11.9 9/11/19   Boy Sparrow MD  
polyethylene glycol (MIRALAX) 17 gram packet Take 1 Packet by mouth daily. 5/17/19   Andrés Rea MD  
varicella-zoster recombinant, PF, (SHINGRIX, PF,) 50 mcg/0.5 mL susr injection 0.5mL by IntraMUSCular route once now and then repeat in 2-6 months 4/17/19   Boy Sparrow MD  
 
No Known Allergies Review of Systems Musculoskeletal: Positive for falls. Neurological: Positive for dizziness and weakness. Negative for speech change, focal weakness, seizures and headaches. Psychiatric/Behavioral: Positive for memory loss. Objective:  
Vital Signs: (As obtained by patient/caregiver at home) Visit Vitals Ht 5' 5\" (1.651 m) Wt 120 lb (54.4 kg) LMP  (LMP Unknown) BMI 19.97 kg/m² [INSTRUCTIONS:  \"[x]\" Indicates a positive item  \"[]\" Indicates a negative item  -- DELETE ALL ITEMS NOT EXAMINED] Constitutional: [x] Appears well-developed and well-nourished [x] No apparent distress   
  [] Abnormal - Mental status: [x] Alert and awake  [x] Oriented to person/place/time [x] Able to follow commands   
[] Abnormal - Eyes:   EOM    [x]  Normal    [] Abnormal -  
 Sclera  [x]  Normal    [] Abnormal - 
        Discharge [x]  None visible   [] Abnormal - HENT: [x] Normocephalic, atraumatic  [] Abnormal -  
[x] Mouth/Throat: Mucous membranes are moist 
 
External Ears [x] Normal  [] Abnormal - Neck: [x] No visualized mass [] Abnormal - Pulmonary/Chest: [x] Respiratory effort normal   [x] No visualized signs of difficulty breathing or respiratory distress 
      [] Abnormal - Musculoskeletal:   [x] Normal gait with no signs of ataxia [x] Normal range of motion of neck [] Abnormal -  
 
Neurological:        [x] No Facial Asymmetry (Cranial nerve 7 motor function) (limited exam due to video visit) [x] No gaze palsy  
     [] Abnormal -   
     
Skin:        [x] No significant exanthematous lesions or discoloration noted on facial skin   
     [] Abnormal - Psychiatric:       [x] Normal Affect [] Abnormal -  
     [x] No Hallucinations Other pertinent observable physical exam findings:- 
 
 
 
We discussed the expected course, resolution and complications of the diagnosis(es) in detail. Medication risks, benefits, costs, interactions, and alternatives were discussed as indicated. I advised her to contact the office if her condition worsens, changes or fails to improve as anticipated. She expressed understanding with the diagnosis(es) and plan. Cathy Jones is a 80 y.o. female who was evaluated by a video visit encounter for concerns as above. Patient identification was verified prior to start of the visit. A caregiver was present when appropriate. Due to this being a TeleHealth encounter (During [place] [address] public health emergency), evaluation of the following organ systems was limited: Vitals/Constitutional/EENT/Resp/CV/GI//MS/Neuro/Skin/Heme-Lymph-Imm.  
Pursuant to the emergency declaration under the [facility] waiver authority and the Coronavirus Preparedness and Response Supplemental Appropriations Act, this Virtual  Visit was conducted, with patient's (and/or legal guardian's) consent, to reduce the patient's risk of exposure to COVID-19 and provide necessary medical care. Services were provided through a video synchronous discussion virtually to substitute for in-person clinic visit. Patient at home and I was in office for this visit.  
 
Leila Smith MD

## 2020-04-29 ENCOUNTER — HOME CARE VISIT (OUTPATIENT)
Dept: HOME HEALTH SERVICES | Facility: HOME HEALTH | Age: 85
End: 2020-04-29
Payer: MEDICARE

## 2020-04-29 ENCOUNTER — HOME CARE VISIT (OUTPATIENT)
Dept: SCHEDULING | Facility: HOME HEALTH | Age: 85
End: 2020-04-29
Payer: MEDICARE

## 2020-04-29 PROCEDURE — 3331090001 HH PPS REVENUE CREDIT

## 2020-04-29 PROCEDURE — 3331090002 HH PPS REVENUE DEBIT

## 2020-04-29 PROCEDURE — G0151 HHCP-SERV OF PT,EA 15 MIN: HCPCS

## 2020-04-30 ENCOUNTER — HOME CARE VISIT (OUTPATIENT)
Dept: SCHEDULING | Facility: HOME HEALTH | Age: 85
End: 2020-04-30
Payer: MEDICARE

## 2020-04-30 VITALS
OXYGEN SATURATION: 100 % | DIASTOLIC BLOOD PRESSURE: 50 MMHG | SYSTOLIC BLOOD PRESSURE: 120 MMHG | RESPIRATION RATE: 16 BRPM | TEMPERATURE: 97.3 F | HEART RATE: 70 BPM

## 2020-04-30 PROCEDURE — G0300 HHS/HOSPICE OF LPN EA 15 MIN: HCPCS

## 2020-04-30 PROCEDURE — 3331090002 HH PPS REVENUE DEBIT

## 2020-04-30 PROCEDURE — 3331090001 HH PPS REVENUE CREDIT

## 2020-05-01 PROCEDURE — 3331090002 HH PPS REVENUE DEBIT

## 2020-05-01 PROCEDURE — 3331090001 HH PPS REVENUE CREDIT

## 2020-05-02 VITALS
TEMPERATURE: 98 F | HEART RATE: 62 BPM | DIASTOLIC BLOOD PRESSURE: 50 MMHG | RESPIRATION RATE: 16 BRPM | SYSTOLIC BLOOD PRESSURE: 101 MMHG | OXYGEN SATURATION: 98 %

## 2020-05-02 PROCEDURE — 3331090001 HH PPS REVENUE CREDIT

## 2020-05-02 PROCEDURE — 3331090002 HH PPS REVENUE DEBIT

## 2020-05-03 PROCEDURE — 3331090003 HH PPS REVENUE ADJ

## 2020-05-03 PROCEDURE — 3331090002 HH PPS REVENUE DEBIT

## 2020-05-03 PROCEDURE — 3331090001 HH PPS REVENUE CREDIT

## 2020-05-04 ENCOUNTER — HOME CARE VISIT (OUTPATIENT)
Dept: SCHEDULING | Facility: HOME HEALTH | Age: 85
End: 2020-05-04
Payer: MEDICARE

## 2020-05-04 PROCEDURE — 3331090002 HH PPS REVENUE DEBIT

## 2020-05-04 PROCEDURE — G0300 HHS/HOSPICE OF LPN EA 15 MIN: HCPCS

## 2020-05-04 PROCEDURE — 3331090001 HH PPS REVENUE CREDIT

## 2020-05-04 PROCEDURE — 400013 HH SOC

## 2020-05-05 PROCEDURE — 3331090001 HH PPS REVENUE CREDIT

## 2020-05-05 PROCEDURE — 3331090002 HH PPS REVENUE DEBIT

## 2020-05-06 PROCEDURE — 3331090002 HH PPS REVENUE DEBIT

## 2020-05-06 PROCEDURE — 3331090001 HH PPS REVENUE CREDIT

## 2020-05-07 ENCOUNTER — HOME CARE VISIT (OUTPATIENT)
Dept: SCHEDULING | Facility: HOME HEALTH | Age: 85
End: 2020-05-07
Payer: MEDICARE

## 2020-05-07 VITALS
TEMPERATURE: 98.1 F | HEART RATE: 64 BPM | RESPIRATION RATE: 17 BRPM | OXYGEN SATURATION: 98 % | SYSTOLIC BLOOD PRESSURE: 89 MMHG | DIASTOLIC BLOOD PRESSURE: 59 MMHG

## 2020-05-07 VITALS
SYSTOLIC BLOOD PRESSURE: 95 MMHG | TEMPERATURE: 97.8 F | RESPIRATION RATE: 17 BRPM | DIASTOLIC BLOOD PRESSURE: 43 MMHG | HEART RATE: 69 BPM | OXYGEN SATURATION: 99 %

## 2020-05-07 PROCEDURE — 3331090002 HH PPS REVENUE DEBIT

## 2020-05-07 PROCEDURE — 3331090001 HH PPS REVENUE CREDIT

## 2020-05-07 PROCEDURE — G0300 HHS/HOSPICE OF LPN EA 15 MIN: HCPCS

## 2020-05-08 PROCEDURE — 3331090002 HH PPS REVENUE DEBIT

## 2020-05-08 PROCEDURE — 3331090001 HH PPS REVENUE CREDIT

## 2020-05-09 PROCEDURE — 3331090002 HH PPS REVENUE DEBIT

## 2020-05-09 PROCEDURE — 3331090001 HH PPS REVENUE CREDIT

## 2020-05-10 PROCEDURE — 3331090002 HH PPS REVENUE DEBIT

## 2020-05-10 PROCEDURE — 3331090001 HH PPS REVENUE CREDIT

## 2020-05-11 ENCOUNTER — HOME CARE VISIT (OUTPATIENT)
Dept: SCHEDULING | Facility: HOME HEALTH | Age: 85
End: 2020-05-11
Payer: MEDICARE

## 2020-05-11 PROCEDURE — G0300 HHS/HOSPICE OF LPN EA 15 MIN: HCPCS

## 2020-05-11 PROCEDURE — 3331090001 HH PPS REVENUE CREDIT

## 2020-05-11 PROCEDURE — 3331090002 HH PPS REVENUE DEBIT

## 2020-05-12 PROCEDURE — 3331090002 HH PPS REVENUE DEBIT

## 2020-05-12 PROCEDURE — 3331090001 HH PPS REVENUE CREDIT

## 2020-05-13 VITALS
RESPIRATION RATE: 16 BRPM | DIASTOLIC BLOOD PRESSURE: 59 MMHG | TEMPERATURE: 97.6 F | HEART RATE: 69 BPM | OXYGEN SATURATION: 97 % | SYSTOLIC BLOOD PRESSURE: 135 MMHG

## 2020-05-13 PROCEDURE — 3331090002 HH PPS REVENUE DEBIT

## 2020-05-13 PROCEDURE — 3331090001 HH PPS REVENUE CREDIT

## 2020-05-14 ENCOUNTER — HOME CARE VISIT (OUTPATIENT)
Dept: SCHEDULING | Facility: HOME HEALTH | Age: 85
End: 2020-05-14
Payer: MEDICARE

## 2020-05-14 PROCEDURE — 3331090001 HH PPS REVENUE CREDIT

## 2020-05-14 PROCEDURE — G0300 HHS/HOSPICE OF LPN EA 15 MIN: HCPCS

## 2020-05-14 PROCEDURE — 3331090002 HH PPS REVENUE DEBIT

## 2020-05-15 PROCEDURE — 3331090002 HH PPS REVENUE DEBIT

## 2020-05-15 PROCEDURE — 3331090001 HH PPS REVENUE CREDIT

## 2020-05-16 PROCEDURE — 3331090001 HH PPS REVENUE CREDIT

## 2020-05-16 PROCEDURE — 3331090002 HH PPS REVENUE DEBIT

## 2020-05-17 VITALS
SYSTOLIC BLOOD PRESSURE: 121 MMHG | RESPIRATION RATE: 16 BRPM | DIASTOLIC BLOOD PRESSURE: 59 MMHG | OXYGEN SATURATION: 98 % | HEART RATE: 67 BPM | TEMPERATURE: 98 F

## 2020-05-17 PROCEDURE — 3331090001 HH PPS REVENUE CREDIT

## 2020-05-17 PROCEDURE — 3331090002 HH PPS REVENUE DEBIT

## 2020-05-18 PROCEDURE — 3331090002 HH PPS REVENUE DEBIT

## 2020-05-18 PROCEDURE — 3331090001 HH PPS REVENUE CREDIT

## 2020-05-19 PROCEDURE — 3331090002 HH PPS REVENUE DEBIT

## 2020-05-19 PROCEDURE — 3331090001 HH PPS REVENUE CREDIT

## 2020-05-20 ENCOUNTER — HOME CARE VISIT (OUTPATIENT)
Dept: SCHEDULING | Facility: HOME HEALTH | Age: 85
End: 2020-05-20
Payer: MEDICARE

## 2020-05-20 PROCEDURE — 3331090001 HH PPS REVENUE CREDIT

## 2020-05-20 PROCEDURE — 3331090002 HH PPS REVENUE DEBIT

## 2020-05-20 PROCEDURE — G0300 HHS/HOSPICE OF LPN EA 15 MIN: HCPCS

## 2020-05-21 PROCEDURE — 3331090001 HH PPS REVENUE CREDIT

## 2020-05-21 PROCEDURE — 3331090002 HH PPS REVENUE DEBIT

## 2020-05-22 PROCEDURE — 3331090001 HH PPS REVENUE CREDIT

## 2020-05-22 PROCEDURE — 3331090002 HH PPS REVENUE DEBIT

## 2020-05-23 PROCEDURE — 3331090002 HH PPS REVENUE DEBIT

## 2020-05-23 PROCEDURE — 3331090001 HH PPS REVENUE CREDIT

## 2020-05-24 PROCEDURE — 3331090001 HH PPS REVENUE CREDIT

## 2020-05-24 PROCEDURE — 3331090002 HH PPS REVENUE DEBIT

## 2020-05-25 PROCEDURE — 3331090001 HH PPS REVENUE CREDIT

## 2020-05-25 PROCEDURE — 3331090002 HH PPS REVENUE DEBIT

## 2020-05-26 PROCEDURE — 3331090002 HH PPS REVENUE DEBIT

## 2020-05-26 PROCEDURE — 3331090001 HH PPS REVENUE CREDIT

## 2020-05-27 ENCOUNTER — HOME CARE VISIT (OUTPATIENT)
Dept: SCHEDULING | Facility: HOME HEALTH | Age: 85
End: 2020-05-27
Payer: MEDICARE

## 2020-05-27 PROCEDURE — G0299 HHS/HOSPICE OF RN EA 15 MIN: HCPCS

## 2020-05-27 PROCEDURE — 3331090001 HH PPS REVENUE CREDIT

## 2020-05-27 PROCEDURE — 3331090002 HH PPS REVENUE DEBIT

## 2020-05-28 PROCEDURE — 3331090001 HH PPS REVENUE CREDIT

## 2020-05-28 PROCEDURE — 3331090002 HH PPS REVENUE DEBIT

## 2020-05-29 PROCEDURE — 3331090001 HH PPS REVENUE CREDIT

## 2020-05-29 PROCEDURE — 3331090002 HH PPS REVENUE DEBIT

## 2020-05-30 PROCEDURE — 3331090001 HH PPS REVENUE CREDIT

## 2020-05-30 PROCEDURE — 3331090002 HH PPS REVENUE DEBIT

## 2020-05-31 PROCEDURE — 3331090002 HH PPS REVENUE DEBIT

## 2020-05-31 PROCEDURE — 3331090001 HH PPS REVENUE CREDIT

## 2020-06-01 VITALS
HEART RATE: 75 BPM | SYSTOLIC BLOOD PRESSURE: 125 MMHG | TEMPERATURE: 98 F | OXYGEN SATURATION: 98 % | RESPIRATION RATE: 16 BRPM | DIASTOLIC BLOOD PRESSURE: 76 MMHG

## 2020-06-01 PROCEDURE — 3331090002 HH PPS REVENUE DEBIT

## 2020-06-01 PROCEDURE — 3331090001 HH PPS REVENUE CREDIT

## 2020-06-07 VITALS
OXYGEN SATURATION: 99 % | RESPIRATION RATE: 16 BRPM | SYSTOLIC BLOOD PRESSURE: 134 MMHG | HEART RATE: 69 BPM | TEMPERATURE: 97.9 F | DIASTOLIC BLOOD PRESSURE: 67 MMHG

## 2020-06-26 RX ORDER — AMLODIPINE BESYLATE 2.5 MG/1
TABLET ORAL
Qty: 90 TAB | Refills: 0 | OUTPATIENT
Start: 2020-06-26

## 2020-07-07 DIAGNOSIS — I95.1 ORTHOSTATIC HYPOTENSION: ICD-10-CM

## 2020-07-07 RX ORDER — FLUDROCORTISONE ACETATE 0.1 MG/1
0.2 TABLET ORAL DAILY
Qty: 60 TAB | Refills: 2 | Status: SHIPPED | OUTPATIENT
Start: 2020-07-07 | End: 2020-09-23

## 2020-07-07 NOTE — TELEPHONE ENCOUNTER
Pt is out of medication. She is confused about being switched from amlodepine to florinef but I tried to help her understand.  She is out of medication    Dr Harley Orozco is on vacation

## 2020-08-06 ENCOUNTER — APPOINTMENT (OUTPATIENT)
Dept: CT IMAGING | Age: 85
End: 2020-08-06
Attending: EMERGENCY MEDICINE
Payer: MEDICARE

## 2020-08-06 ENCOUNTER — APPOINTMENT (OUTPATIENT)
Dept: GENERAL RADIOLOGY | Age: 85
End: 2020-08-06
Attending: EMERGENCY MEDICINE
Payer: MEDICARE

## 2020-08-06 ENCOUNTER — HOSPITAL ENCOUNTER (EMERGENCY)
Age: 85
Discharge: HOME | End: 2020-08-06
Attending: EMERGENCY MEDICINE
Payer: MEDICARE

## 2020-08-06 VITALS
RESPIRATION RATE: 20 BRPM | OXYGEN SATURATION: 95 % | DIASTOLIC BLOOD PRESSURE: 58 MMHG | HEART RATE: 83 BPM | BODY MASS INDEX: 19.97 KG/M2 | TEMPERATURE: 97.6 F | WEIGHT: 120 LBS | SYSTOLIC BLOOD PRESSURE: 161 MMHG

## 2020-08-06 DIAGNOSIS — S60.212A CONTUSION OF LEFT WRIST, INITIAL ENCOUNTER: ICD-10-CM
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
## 2020-08-06 PROCEDURE — 99284 EMERGENCY DEPT VISIT MOD MDM: CPT

## 2020-08-06 PROCEDURE — 73110 X-RAY EXAM OF WRIST: CPT

## 2020-08-06 PROCEDURE — 74011250637 HC RX REV CODE- 250/637: Performed by: EMERGENCY MEDICINE

## 2020-08-06 PROCEDURE — 72125 CT NECK SPINE W/O DYE: CPT

## 2020-08-06 PROCEDURE — 70450 CT HEAD/BRAIN W/O DYE: CPT

## 2020-08-06 RX ORDER — HYDROCODONE BITARTRATE AND ACETAMINOPHEN 5; 325 MG/1; MG/1
1 TABLET ORAL
Status: COMPLETED | OUTPATIENT
Start: 2020-08-06 | End: 2020-08-06

## 2020-08-06 RX ADMIN — HYDROCODONE BITARTRATE AND ACETAMINOPHEN 1 TABLET: 5; 325 TABLET ORAL at 09:48

## 2020-08-06 NOTE — ED NOTES
Pt was discharged and given instructions by Dr Zackary Forrester . Pt verbalized good understanding of all discharge instructions,  and F/U care. All questions answered. Pt in stable condition on discharge.

## 2020-08-06 NOTE — DISCHARGE INSTRUCTIONS
Take extra strength Tylenol every 6 hours as needed for pain. Wear your wrist brace for comfort. Follow-up with your primary care doctor. Return to the emergency department for any new or worsening symptoms.

## 2020-08-06 NOTE — ED NOTES
Purposeful rounding done. Pt resting with eyes closed. Daughter at bedside states Jonn Pale is asleep. \" Respirations even and non labored. Call bell in reach will continue to monitor.

## 2020-08-06 NOTE — ED TRIAGE NOTES
Pt was wheeled to the treatment area. Pt states \"I fell this morning I use a walker I went to move it out of my way and I went down I hit the whole left side my left wrist is red and swollen. \" Dr Zackary Forrester in room.

## 2020-08-06 NOTE — ED PROVIDER NOTES
25-year-old female presents after a ground-level fall today at 7 AM.  She states that she slipped on her tile floor. She has been having difficulty with frequent falls and balance for some time. She states that she did hit her head but did not lose consciousness. She takes a blood thinner but does not know what it is. She denies any numbness or tingling anywhere. She complains of pain to her left wrist.  She complains of other chronic pain but cannot state that it is any worse than normal.  She rates her pain 10 out of 10 in the left wrist.  Her left upper extremity is chronically swollen from lymphedema secondary to breast cancer treatment. Fall Arm Pain Past Medical History:  
Diagnosis Date  Anemia  Breast cancer ([facility]  Cancer [facility])   
 left breast  
 Cataract  Chronic renal failure  Colonic polyp  Dementia ([facility]  Diabetes ([facility]  Eczema  Headache  Hyperlipemia  Hypothyroidism  Ill-defined condition   
 high cholesterol, hypothyroid  Osteopenia  Posterior vitreous detachment, left  Type 2 diabetes mellitus ([facility]  UTI (urinary tract infection)  Vitamin D deficiency Past Surgical History:  
Procedure Laterality Date  BREAST SURGERY PROCEDURE UNLISTED    
 left breast lumpectomy  HX APPENDECTOMY  HX CATARACT REMOVAL    
 HX COLONOSCOPY  2009  HX GYN    
 hysterectomy [address] Left breast ,edial segmental mastectomy- 1992;  
 HX ORTHOPAEDIC    
 back Family History:  
Problem Relation Age of Onset  No Known Problems Mother  No Known Problems Father Social History Socioeconomic History  Marital status:  Spouse name: Not on file  Number of children: Not on file  Years of education: Not on file  Highest education level: Not on file Occupational History  Not on file Social Needs  Financial resource strain: Not on file  Food insecurity Worry: Not on file Inability: Not on file  Transportation needs Medical: Not on file Non-medical: Not on file Tobacco Use  Smoking status: Never Smoker  Smokeless tobacco: Never Used Substance and Sexual Activity  Alcohol use: No  
 Drug use: No  
 Sexual activity: Not Currently Lifestyle  Physical activity Days per week: Not on file Minutes per session: Not on file  Stress: Not on file Relationships  Social connections Talks on phone: Not on file Gets together: Not on file Attends Druze service: Not on file Active member of club or organization: Not on file Attends meetings of clubs or organizations: Not on file Relationship status: Not on file  Intimate partner violence Fear of current or ex partner: Not on file Emotionally abused: Not on file Physically abused: Not on file Forced sexual activity: Not on file Other Topics Concern  Not on file Social History Narrative ** Merged History Encounter ** ALLERGIES: Patient has no known allergies. Review of Systems All other systems reviewed and are negative. Vitals:  
 08/06/20 0930 08/06/20 1000 08/06/20 1100 BP: 149/68 171/56 161/58 Pulse: 83 Resp: 20 Temp: 97.6 °F (36.4 °C) SpO2: 99% 100% 95% Weight: 54.4 kg (120 lb) Physical Exam 
Vitals signs and nursing note reviewed. Constitutional:   
   General: She is not in acute distress. HENT:  
   Head: Normocephalic and atraumatic. Mouth/Throat:  
   Mouth: Mucous membranes are moist.  
Eyes:  
   General: No scleral icterus. Conjunctiva/sclera: Conjunctivae normal.  
   Pupils: Pupils are equal, round, and reactive to light. Neck: Musculoskeletal: Neck supple. Trachea: No tracheal deviation. Comments: No cervical midline tenderness. Cardiovascular:  
   Rate and Rhythm: Normal rate and regular rhythm. Pulmonary:  
   Effort: Pulmonary effort is normal. No respiratory distress. Breath sounds: No wheezing or rales. Abdominal:  
   General: There is no distension. Palpations: Abdomen is soft. Tenderness: There is no abdominal tenderness. Genitourinary: 
   Comments: deferred Musculoskeletal:     
   General: No deformity. Comments: Left wrist is tender to palpation. No spinal tenderness. Pelvis stable. Remainder of her extremities are atraumatic and nontender. Skin: 
   General: Skin is warm and dry. Neurological:  
   General: No focal deficit present. Mental Status: She is alert and oriented to person, place, and time. Comments: Normal motor and sensation in the upper extremities. Psychiatric:     
   Mood and Affect: Mood normal.  
 
  
 
MDM Procedures 11:58 AM 
Patient re-evaluated. All questions answered. Patient appropriate for discharge. Given return precautions and follow up instructions. LABORATORY TESTS: 
Labs Reviewed - No data to display IMAGING RESULTS: 
CT SPINE CERV WO CONT Final Result IMPRESSION:  
Degenerative spinal changes as described. No fracture. No acute intracranial process seen. Moderate bilateral pleural effusions. 7 mm right apical nodular focus for which follow-up is recommended. CT HEAD WO CONT Final Result IMPRESSION:  
Degenerative spinal changes as described. No fracture. No acute intracranial process seen. Moderate bilateral pleural effusions. 7 mm right apical nodular focus for which follow-up is recommended. XR WRIST LT AP/LAT/OBL MIN 3V Final Result IMPRESSION: Osteopenic left wrist.  
  
 
 
MEDICATIONS GIVEN: 
Medications HYDROcodone-acetaminophen (NORCO) 5-325 mg per tablet 1 Tab (1 Tab Oral Given 8/6/20 5061) IMPRESSION: 
1. Fall, initial encounter 2. Contusion of left wrist, initial encounter PLAN: 
1. Current Discharge Medication List  
  
 
2. Follow-up Information Follow up With Specialties Details Why Contact Info Merlyn Hebert MD Family Medicine Schedule an appointment as soon as possible for a visit  [address] [facility] [address] [facility] 
[phone] [facility] EMERGENCY DEPT Emergency Medicine  If symptoms worsen or new concerns [facility] T[facility]ACILITY [address] 
[phone] 3. Velcro wrist brace. Advised to ice several times a day. Return to ED for new or worsening symptoms Lexi Noe.  Aguilar Garcia MD

## 2020-08-07 ENCOUNTER — PATIENT OUTREACH (OUTPATIENT)
Dept: CASE MANAGEMENT | Age: 85
End: 2020-08-07

## 2020-08-07 NOTE — PROGRESS NOTES
Patient contacted regarding recent discharge and COVID-19 risk. Discussed COVID-19 related testing which was not done at this time. Test results were not done. Patient informed of results, if available? no   
Care Transition Nurse/ Ambulatory Care Manager contacted the patient by telephone to perform post discharge assessment. Verified name and  with patient as identifiers. Patient has following risk factors of: diabetes. CTN/ACM reviewed discharge instructions, medical action plan and red flags related to discharge diagnosis. Reviewed and educated them on any new and changed medications related to discharge diagnosis. Advised obtaining a 90-day supply of all daily and as-needed medications. Advance Care Planning:  
Does patient have an Advance Directive: decision makers updated Education provided regarding infection prevention, and signs and symptoms of COVID-19 and when to seek medical attention with patient who verbalized understanding. Discussed exposure protocols and quarantine from [address] you at higher risk for severe illness  and given an opportunity for questions and concerns. The patient agrees to contact the COVID-19 hotline [phone] or PCP office for questions related to their healthcare. CTN/ACM provided contact information for future reference. From CDC: Are you at higher risk for severe illness?  Wash your hands often.  Avoid close contact (6 feet, which is about two arm lengths) with people who are sick.  Put distance between yourself and other people if COVID-19 is spreading in your community.  Clean and disinfect frequently touched surfaces.  Avoid all cruise travel and non-essential air travel.  Call your healthcare professional if you have concerns about COVID-19 and your underlying condition or if you are sick. For more information on steps you can take to protect yourself, see CDC's How to Protect Yourself Patient/family/caregiver given information for [employer] and agrees to enroll no Patient's preferred e-mail:  n/a Patient's preferred phone number: n/a Based on Loop alert triggers, patient will be contacted by nurse care manager for worsening symptoms. Plan for follow-up call in 7-14 days based on severity of symptoms and risk factors.

## 2020-08-14 ENCOUNTER — HOSPITAL ENCOUNTER (OUTPATIENT)
Dept: GENERAL RADIOLOGY | Age: 85
Discharge: HOME | End: 2020-08-14
Attending: FAMILY MEDICINE
Payer: MEDICARE

## 2020-08-14 ENCOUNTER — OFFICE VISIT (OUTPATIENT)
Dept: FAMILY MEDICINE CLINIC | Age: 85
End: 2020-08-14
Payer: MEDICARE

## 2020-08-14 VITALS
WEIGHT: 132 LBS | HEIGHT: 65 IN | HEART RATE: 69 BPM | SYSTOLIC BLOOD PRESSURE: 161 MMHG | TEMPERATURE: 98 F | RESPIRATION RATE: 16 BRPM | BODY MASS INDEX: 21.99 KG/M2 | OXYGEN SATURATION: 100 % | DIASTOLIC BLOOD PRESSURE: 71 MMHG

## 2020-08-14 DIAGNOSIS — G45.9 TIA (TRANSIENT ISCHEMIC ATTACK): ICD-10-CM

## 2020-08-14 DIAGNOSIS — M25.532 LEFT WRIST PAIN: ICD-10-CM

## 2020-08-14 DIAGNOSIS — I10 ESSENTIAL HYPERTENSION: ICD-10-CM
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
## 2020-08-14 DIAGNOSIS — M25.532 LEFT WRIST PAIN: Primary | ICD-10-CM

## 2020-08-14 PROCEDURE — G8510 SCR DEP NEG, NO PLAN REQD: HCPCS | Performed by: FAMILY MEDICINE

## 2020-08-14 PROCEDURE — G8420 CALC BMI NORM PARAMETERS: HCPCS | Performed by: FAMILY MEDICINE

## 2020-08-14 PROCEDURE — 1090F PRES/ABSN URINE INCON ASSESS: CPT | Performed by: FAMILY MEDICINE

## 2020-08-14 PROCEDURE — G8536 NO DOC ELDER MAL SCRN: HCPCS | Performed by: FAMILY MEDICINE

## 2020-08-14 PROCEDURE — G8427 DOCREV CUR MEDS BY ELIG CLIN: HCPCS | Performed by: FAMILY MEDICINE

## 2020-08-14 PROCEDURE — 99214 OFFICE O/P EST MOD 30 MIN: CPT | Performed by: FAMILY MEDICINE

## 2020-08-14 PROCEDURE — 73090 X-RAY EXAM OF FOREARM: CPT

## 2020-08-14 PROCEDURE — 73110 X-RAY EXAM OF WRIST: CPT

## 2020-08-14 PROCEDURE — 1100F PTFALLS ASSESS-DOCD GE2>/YR: CPT | Performed by: FAMILY MEDICINE

## 2020-08-14 PROCEDURE — 3288F FALL RISK ASSESSMENT DOCD: CPT | Performed by: FAMILY MEDICINE

## 2020-08-14 NOTE — PROGRESS NOTES
Assessment and Plan 1. Left wrist pain Previous xrays ok but still has point tenderness distal radius so will repeat - XR WRIST LT AP/LAT/OBL MIN 3V; Future - XR FOREARM LT AP/LAT; Future 2. TIA (transient ischemic attack) Perhaps had another TIA Now resolved On antiplatelet med 3. Essential hypertension Previously intolerant of BP treatment with orthostatic BP sx 4. Fall, initial encounter Stable post fall. Sounds like balance loss and not syncope. SX, meds and treatment including ongoing risk of stroke discussed with patient and niece. They are satisfied with current treatment to prevent CVA and do not want to resume BP treatment. Follow-up and Dispositions · Return in about 3 months (around 11/14/2020) for Blood pressure follow up, Medication follow up. Diagnosis and plan discussed with patient who verbillized understanding. History of present Modesta Santizo is a 80 y.o. female presenting for Hypertension and Fall (8/5/2020  Went to the ER  xray was normal - still having lots of pain ( Lt ) ) Chen Eh 8/5 Went to ER at [facility] showed no fracture. Still hurts in the forearm. Left distal  ulna is worst and has swelling Subsequent to this had episode where she had difficulty speaking and left side of her face was numb when talking to family member. No sx now. Lasted a few minutes. No relationship of this episode to fall. Now on rx of orthostatic hypotension Did not tolerate rx of HTN previously after last TIA. Review of Systems Constitutional: Negative for chills and fever. HENT: Negative for congestion and sore throat. Respiratory: Negative for shortness of breath. Cardiovascular: Negative for chest pain and palpitations. Musculoskeletal: Positive for falls and joint pain. Neurological: Positive for sensory change, speech change and weakness. Negative for focal weakness, seizures and loss of consciousness. Psychiatric/Behavioral: Negative. Past Medical History:  
Diagnosis Date  Anemia  Breast cancer ([facility]  Cancer [facility])   
 left breast  
 Cataract  Chronic renal failure  Colonic polyp  [facility]  [facility]  Eczema  Headache  Hyperlipemia  Hypothyroidism  Ill-defined condition   
 high cholesterol, hypothyroid  Osteopenia  Posterior vitreous detachment, left  Type 2 diabetes mellitus ([facility]  UTI (urinary tract infection)  Vitamin D deficiency Past Surgical History:  
Procedure Laterality Date  BREAST SURGERY PROCEDURE UNLISTED    
 left breast lumpectomy  HX APPENDECTOMY  HX CATARACT REMOVAL    
 HX COLONOSCOPY  2009  HX GYN    
 hysterectomy [id number] Left breast ,edial segmental mastectomy- 1992;  
 HX ORTHOPAEDIC    
 back Family History Problem Relation Age of Onset  No Known Problems Mother  No Known Problems Father Social History Socioeconomic History  Marital status:  Spouse name: Not on file  Number of children: Not on file  Years of education: Not on file  Highest education level: Not on file Occupational History  Not on file Social Needs  Financial resource strain: Not on file  Food insecurity Worry: Not on file Inability: Not on file  Transportation needs Medical: Not on file Non-medical: Not on file Tobacco Use  Smoking status: Never Smoker  Smokeless tobacco: Never Used Substance and Sexual Activity  Alcohol use: No  
 Drug use: No  
 Sexual activity: Not Currently Lifestyle  Physical activity Days per week: Not on file Minutes per session: Not on file  Stress: Not on file Relationships  Social connections Talks on phone: Not on file Gets together: Not on file Attends [employer] service: Not on file Active member of club or organization: Not on file Attends meetings of clubs or organizations: Not on file Relationship status: Not on file  Intimate partner violence Fear of current or ex partner: Not on file Emotionally abused: Not on file Physically abused: Not on file Forced sexual activity: Not on file Other Topics Concern  Not on file Social History Narrative ** Merged History Encounter **  
    
 
 
 
Prior to Admission medications Medication Sig Start Date End Date Taking? Authorizing Provider  
fludrocortisone (FLORINEF) 0.1 mg tablet Take 2 Tabs by mouth daily. 7/7/20  Yes Evan Roger NP  
atorvastatin (LIPITOR) 40 mg tablet Take 1 tablet by mouth once daily 6/25/20  Yes Ning Noel MD  
clopidogreL (PLAVIX) 75 mg tab Take 1 tablet by mouth once daily 6/25/20  Yes Ning Noel MD  
docusate sodium (COLACE) 100 mg capsule Take 100 mg by mouth two (2) times a day. Yes Ning Noel MD  
levothyroxine (SYNTHROID) 50 mcg tablet TAKE 1 TABLET BY MOUTH ONCE DAILY 12/12/19  Yes Ning Noel MD  
ferrous sulfate 325 mg (65 mg iron) tablet Take 1 Tab by mouth two (2) times a day. 9/30/19  Yes Ning Noel MD  
glucose blood VI test strips (TRUE METRIX GLUCOSE TEST STRIP) strip USE 1 STRIP TO CHECK GLUCOSE ONCE DAILY, DM2 E11.9 9/11/19  Yes Ning Noel MD  
polyethylene glycol (MIRALAX) 17 gram packet Take 1 Packet by mouth daily. 5/17/19  Yes Vandana Rea MD  
varicella-zoster recombinant, PF, (SHINGRIX, PF,) 50 mcg/0.5 mL susr injection 0.5mL by IntraMUSCular route once now and then repeat in 2-6 months 4/17/19 8/14/20  Ning Noel MD  
  
 
No Known Allergies Vitals:  
 08/14/20 1045 BP: 161/71 Pulse: 69 Resp: 16 Temp: 98 °F (36.7 °C) TempSrc: Oral  
SpO2: 100% Weight: 132 lb (59.9 kg) Height: 5' 5\" (1.651 m) Body mass index is 21.97 kg/m². Objective Physical Exam 
Vitals signs and nursing note reviewed. Constitutional: Appearance: Normal appearance. She is well-developed. She is not toxic-appearing. Comments: In wheelchair. Very elderly. HENT:  
   Head: Normocephalic and atraumatic. Neck: Musculoskeletal: Normal range of motion and neck supple. No muscular tenderness. Thyroid: No thyromegaly. Trachea: No tracheal deviation. Cardiovascular:  
   Rate and Rhythm: Normal rate. Rhythm irregular. Heart sounds: Normal heart sounds. No murmur. No friction rub. No gallop. Pulmonary:  
   Effort: Pulmonary effort is normal. No respiratory distress. Breath sounds: Normal breath sounds. No wheezing, rhonchi or rales. Lymphadenopathy:  
   Cervical: No cervical adenopathy. Neurological:  
   Mental Status: She is alert and oriented to person, place, and time. Cranial Nerves: Cranial nerves are intact. No cranial nerve deficit. Coordination: Coordination normal.  
   Comments: CN 2-12 intact. Symmetric smile No weakness of upper extremities except limited by pain left wrist.  
Psychiatric:     
   Mood and Affect: Mood normal.     
   Behavior: Behavior normal.     
   Thought Content: Thought content normal.     
   Judgment: Judgment normal.  
 
 
Left arm edematous and tender at distal radius to palpation. Otherwise normal exam with FROM of elbow. Painful ROM with wrist. 
Previous xrays showed only osteopenia. Identified pt with two pt identifiers(name and ). Reviewed record in preparation for visit and have obtained necessary documentation. Chief Complaint Patient presents with  Hypertension  Fall  
  2020  Went to the ER  xray was normal - still having lots of pain ( Lt ) Health Maintenance Due Topic  Foot Exam Q1   
 Eye Exam Retinal or Dilated  Bone Densitometry (Dexa) Screening  MICROALBUMIN Q1   
 Shingrix Vaccine Age 50> (2 of 2)  Medicare Yearly Exam   
 Lipid Screen  Influenza Age 5 to Adult Coordination of Care Questionnaire: 
:  
1) Have you been to an emergency room, urgent care, or hospitalized since your last visit? If yes, where when, and reason for visit? No  
  
 
2. Have seen or consulted any other health care provider since your last visit? If yes, where when, and reason for visit?  No

## 2020-08-17 NOTE — PROGRESS NOTES
Call her and family. No fracture on repeat xrays. Pain should gradually improve over another 1-2 weeks.   [facility]

## 2020-08-21 ENCOUNTER — PATIENT OUTREACH (OUTPATIENT)
Dept: CASE MANAGEMENT | Age: 85
End: 2020-08-21

## 2020-08-21 DIAGNOSIS — E03.9 ACQUIRED HYPOTHYROIDISM: ICD-10-CM

## 2020-08-21 RX ORDER — LEVOTHYROXINE SODIUM 50 UG/1
TABLET ORAL
Qty: 90 TAB | Refills: 1 | Status: SHIPPED | OUTPATIENT
Start: 2020-08-21 | End: 2020-08-25

## 2020-08-21 NOTE — PROGRESS NOTES
Patient resolved from Transition of Care episode on 8/21/20 Discussed COVID-19 related testing which was not done at this time. Test results were not done. Patient informed of results, if available? no  
 
Patient/family has been provided the following resources and education related to COVID-19:  
           
          Signs, symptoms and red flags related to COVID-19 CDC exposure and quarantine guidelines Conduit exposure contact - [phone] Contact for their local Department of Health Patient currently reports shortness of breath. Saw PCP last Friday and states that this was discussed. SOB not improving. Advised patient return to ED for evaluation. Patient refused - does not feel she needs to return to the ED. Patient states she will call her PCP for follow-up. No further outreach scheduled with this CTN/ACM/LPN/HC/ MA. Episode of Care resolved. Patient has this CTN/ACM/LPN/HC/MA contact information if future needs arise.

## 2020-08-31 ENCOUNTER — VIRTUAL VISIT (OUTPATIENT)
Dept: FAMILY MEDICINE CLINIC | Age: 85
End: 2020-08-31
Payer: MEDICARE

## 2020-08-31 DIAGNOSIS — D50.9 MICROCYTIC ANEMIA: ICD-10-CM

## 2020-08-31 DIAGNOSIS — N18.30 STAGE 3 CHRONIC KIDNEY DISEASE (HCC): ICD-10-CM

## 2020-08-31 DIAGNOSIS — R06.09 OTHER FORM OF DYSPNEA: Primary | ICD-10-CM

## 2020-08-31 DIAGNOSIS — D64.9 ANEMIA, UNSPECIFIED TYPE: ICD-10-CM

## 2020-08-31 DIAGNOSIS — K59.00 CONSTIPATION, UNSPECIFIED CONSTIPATION TYPE: ICD-10-CM

## 2020-08-31 DIAGNOSIS — I95.1 ORTHOSTATIC HYPOTENSION: ICD-10-CM

## 2020-08-31 PROCEDURE — 3288F FALL RISK ASSESSMENT DOCD: CPT | Performed by: FAMILY MEDICINE

## 2020-08-31 PROCEDURE — 1100F PTFALLS ASSESS-DOCD GE2>/YR: CPT | Performed by: FAMILY MEDICINE

## 2020-08-31 PROCEDURE — G8536 NO DOC ELDER MAL SCRN: HCPCS | Performed by: FAMILY MEDICINE

## 2020-08-31 PROCEDURE — 1090F PRES/ABSN URINE INCON ASSESS: CPT | Performed by: FAMILY MEDICINE

## 2020-08-31 PROCEDURE — 99214 OFFICE O/P EST MOD 30 MIN: CPT | Performed by: FAMILY MEDICINE

## 2020-08-31 PROCEDURE — G8427 DOCREV CUR MEDS BY ELIG CLIN: HCPCS | Performed by: FAMILY MEDICINE

## 2020-08-31 PROCEDURE — G8420 CALC BMI NORM PARAMETERS: HCPCS | Performed by: FAMILY MEDICINE

## 2020-08-31 PROCEDURE — G8432 DEP SCR NOT DOC, RNG: HCPCS | Performed by: FAMILY MEDICINE

## 2020-08-31 RX ORDER — LANOLIN ALCOHOL/MO/W.PET/CERES
325 CREAM (GRAM) TOPICAL 2 TIMES DAILY
Qty: 180 TAB | Refills: 1 | Status: SHIPPED | OUTPATIENT
Start: 2020-08-31 | End: 2021-05-06

## 2020-08-31 RX ORDER — POLYETHYLENE GLYCOL 3350 17 G/17G
17 POWDER, FOR SOLUTION ORAL DAILY
Qty: 30 PACKET | Refills: 2 | Status: SHIPPED | OUTPATIENT
Start: 2020-08-31

## 2020-08-31 NOTE — PROGRESS NOTES
Dennie Nanas is a 80 y.o. female who was seen by synchronous (real-time) audio-video technology on 8/31/2020. Consent: Dennie Nanas, who was seen by synchronous (real-time) audio-video technology, and/or her healthcare decision maker, is aware that this patient-initiated, Telehealth encounter on 8/31/2020 is a billable service, with coverage as determined by her insurance carrier. She is aware that she may receive a bill and has provided verbal consent to proceed: Yes. VV with Mrs. Beatris Khan and Goddaughter Jj Camarillo who is parttime caretaker. Assessment & Plan:  
Diagnoses and all orders for this visit: 
 
1. Orthostatic hypotension Stable currently on flourinef 2. Stage 3 chronic kidney disease (HCC) 
-     CBC WITH AUTOMATED DIFF; Future Reassess for possible use of diuretics if necessary 3. Microcytic anemia -     FERRITIN; Future 
-     IRON PROFILE; Future -     METABOLIC PANEL, BASIC; Future Not on iron. To resume that daiy Resume Miralax as well. 4. Other form of dyspnea -     XR CHEST PA LAT; Future Unclear if from Cardiac, pulmonary source or anemia Home health to see and evaluate BP, pulse ox, need for O2 Follow-up and Dispositions · Return in about 2 weeks (around 9/14/2020) for Medication follow up, Review test results. I spent at least 23 minutes on this visit with this established patient. ([id number]) Subjective:  
Dennie Nanas is a 80 y.o. female who was seen for Breathing Problem (Would like to have orders for Home Health ) and Medication Evaluation (Not sure if she shuld still be taking Ferrous Sulfate ) Discussed her mutliple issues with caretaker Orthostatic sx. Currently stable Dyspnea on exertion Dsypneic walking Stable with walker. Sleeps on several pillows at night and wakes with dyspnea and chest pressure Sleeps sitting up some. Does she need O2 at night. No real ankle edema, only swollen in arm where she fell Some constipation. Not currently on iron. Did have HGB under 9 No blood in urine or stool. Prior to Admission medications Medication Sig Start Date End Date Taking? Authorizing Provider  
levothyroxine (SYNTHROID) 50 mcg tablet Take 1 tablet by mouth once daily 8/25/20  Yes Ayala Saldana MD  
fludrocortisone (FLORINEF) 0.1 mg tablet Take 2 Tabs by mouth daily. 7/7/20  Yes Yoni Roger NP  
atorvastatin (LIPITOR) 40 mg tablet Take 1 tablet by mouth once daily 6/25/20  Yes Ayala Saldana MD  
clopidogreL (PLAVIX) 75 mg tab Take 1 tablet by mouth once daily 6/25/20  Yes Ayala Saldana MD  
docusate sodium (COLACE) 100 mg capsule Take 100 mg by mouth two (2) times a day. Yes Ayala Saldana MD  
glucose blood VI test strips (TRUE METRIX GLUCOSE TEST STRIP) strip USE 1 STRIP TO CHECK GLUCOSE ONCE DAILY, DM2 E11.9 9/11/19  Yes Ayala Saldana MD  
ferrous sulfate 325 mg (65 mg iron) tablet Take 1 Tab by mouth two (2) times a day. 9/30/19   Ayala Saldana MD  
polyethylene glycol (MIRALAX) 17 gram packet Take 1 Packet by mouth daily. 5/17/19   Ellie Rea MD  
 
No Known Allergies Review of Systems Respiratory: Positive for shortness of breath. Negative for cough. Cardiovascular: Positive for chest pain and orthopnea. Negative for palpitations and leg swelling. Gastrointestinal: Positive for constipation. Negative for abdominal pain and blood in stool. Genitourinary: Negative for hematuria. Psychiatric/Behavioral: The patient has insomnia. Objective:  
Vital Signs: (As obtained by patient/caregiver at home) Visit Vitals LMP  (LMP Unknown) [INSTRUCTIONS:  \"[x]\" Indicates a positive item  \"[]\" Indicates a negative item  -- DELETE ALL ITEMS NOT EXAMINED] Constitutional: [x] Appears well-developed and well-nourished [x] No apparent distress   
  [] Abnormal - Mental status: [x] Alert and awake  [x] Oriented to person/place/time [x] Able to follow commands   
[] Abnormal - Eyes:   EOM    [x]  Normal    [] Abnormal -  
Sclera  [x]  Normal    [] Abnormal - 
        Discharge [x]  None visible   [] Abnormal - HENT: [x] Normocephalic, atraumatic  [] Abnormal -  
[x] Mouth/Throat: Mucous membranes are moist 
 
External Ears [x] Normal  [] Abnormal - Neck: [x] No visualized mass [] Abnormal - Pulmonary/Chest: [x] Respiratory effort normal   [x] No visualized signs of difficulty breathing or respiratory distress 
      [] Abnormal - Musculoskeletal:   [x] Normal gait with no signs of ataxia [x] Normal range of motion of neck [] Abnormal -  
 
Neurological:        [x] No Facial Asymmetry (Cranial nerve 7 motor function) (limited exam due to video visit) [x] No gaze palsy  
     [] Abnormal -   
     
Skin:        [x] No significant exanthematous lesions or discoloration noted on facial skin   
     [] Abnormal - Psychiatric:       [x] Normal Affect [] Abnormal -  
     [x] No Hallucinations Other pertinent observable physical exam findings:- 
Speaking clearly and breathing easily during video visit. We discussed the expected course, resolution and complications of the diagnosis(es) in detail. Medication risks, benefits, costs, interactions, and alternatives were discussed as indicated. I advised her to contact the office if her condition worsens, changes or fails to improve as anticipated. She expressed understanding with the diagnosis(es) and plan. Blessing Rasmussen is a 80 y.o. female who was evaluated by a video visit encounter for concerns as above. Patient identification was verified prior to start of the visit. A caregiver was present when appropriate. Due to this being a TeleHealth encounter ([facility] emergency), evaluation of the following organ systems was limited: Vitals/Constitutional/EENT/Resp/CV/GI//MS/Neuro/Skin/Heme-Lymph-Imm. Pursuant to the emergency declaration under the [facility], [facility] waiver authority and the [employer] Act, this Virtual  Visit was conducted, with patient's (and/or legal guardian's) consent, to reduce the patient's risk of exposure to COVID-19 and provide necessary medical care. Services were provided through a video synchronous discussion virtually to substitute for in-person clinic visit. Patient was at home and I was in office for this video visit. Abiodun Hutchinson.  Liliam Tang MD

## 2020-08-31 NOTE — PROGRESS NOTES
Portia Cota is a 80 y.o. female Chief Complaint Patient presents with  Breathing Problem Would like to have orders for Home Health  Medication Evaluation Not sure if she shuld still be taking Ferrous Sulfate 1. Have you been to the ER, urgent care clinic since your last visit? Hospitalized since your last visit? No 
 
 
2. Have you seen or consulted any other health care providers outside of the [address] [place] [address] since your last visit? Include any pap smears or colon screening.    No

## 2020-09-01 ENCOUNTER — HOME HEALTH ADMISSION (OUTPATIENT)
Dept: HOME HEALTH SERVICES | Facility: HOME HEALTH | Age: 85
End: 2020-09-01
Payer: MEDICARE

## 2020-09-05 ENCOUNTER — HOME CARE VISIT (OUTPATIENT)
Dept: SCHEDULING | Facility: HOME HEALTH | Age: 85
End: 2020-09-05
Payer: MEDICARE

## 2020-09-05 PROCEDURE — 3331090001 HH PPS REVENUE CREDIT

## 2020-09-05 PROCEDURE — 3331090002 HH PPS REVENUE DEBIT

## 2020-09-05 PROCEDURE — G0299 HHS/HOSPICE OF RN EA 15 MIN: HCPCS

## 2020-09-05 PROCEDURE — 400013 HH SOC

## 2020-09-06 PROCEDURE — 3331090002 HH PPS REVENUE DEBIT

## 2020-09-06 PROCEDURE — 3331090001 HH PPS REVENUE CREDIT

## 2020-09-07 ENCOUNTER — HOSPITAL ENCOUNTER (OUTPATIENT)
Dept: GENERAL RADIOLOGY | Age: 85
Discharge: HOME | End: 2020-09-07
Payer: MEDICARE

## 2020-09-07 DIAGNOSIS — R06.09 OTHER FORM OF DYSPNEA: ICD-10-CM

## 2020-09-07 PROCEDURE — 3331090002 HH PPS REVENUE DEBIT

## 2020-09-07 PROCEDURE — 71046 X-RAY EXAM CHEST 2 VIEWS: CPT

## 2020-09-07 PROCEDURE — 3331090001 HH PPS REVENUE CREDIT

## 2020-09-08 PROCEDURE — 3331090001 HH PPS REVENUE CREDIT

## 2020-09-08 PROCEDURE — 3331090002 HH PPS REVENUE DEBIT

## 2020-09-09 ENCOUNTER — TELEPHONE (OUTPATIENT)
Dept: FAMILY MEDICINE CLINIC | Age: 85
End: 2020-09-09

## 2020-09-09 VITALS
SYSTOLIC BLOOD PRESSURE: 130 MMHG | HEART RATE: 56 BPM | DIASTOLIC BLOOD PRESSURE: 70 MMHG | RESPIRATION RATE: 16 BRPM | TEMPERATURE: 98.1 F

## 2020-09-09 PROCEDURE — 3331090001 HH PPS REVENUE CREDIT

## 2020-09-09 PROCEDURE — 3331090002 HH PPS REVENUE DEBIT

## 2020-09-10 ENCOUNTER — HOME CARE VISIT (OUTPATIENT)
Dept: SCHEDULING | Facility: HOME HEALTH | Age: 85
End: 2020-09-10
Payer: MEDICARE

## 2020-09-10 PROCEDURE — 3331090001 HH PPS REVENUE CREDIT

## 2020-09-10 PROCEDURE — G0299 HHS/HOSPICE OF RN EA 15 MIN: HCPCS

## 2020-09-10 PROCEDURE — 3331090002 HH PPS REVENUE DEBIT

## 2020-09-11 ENCOUNTER — HOME CARE VISIT (OUTPATIENT)
Dept: SCHEDULING | Facility: HOME HEALTH | Age: 85
End: 2020-09-11
Payer: MEDICARE

## 2020-09-11 VITALS
HEART RATE: 61 BPM | OXYGEN SATURATION: 100 % | TEMPERATURE: 97.7 F | RESPIRATION RATE: 16 BRPM | DIASTOLIC BLOOD PRESSURE: 60 MMHG | SYSTOLIC BLOOD PRESSURE: 140 MMHG

## 2020-09-11 PROCEDURE — G0151 HHCP-SERV OF PT,EA 15 MIN: HCPCS

## 2020-09-11 PROCEDURE — 3331090002 HH PPS REVENUE DEBIT

## 2020-09-11 PROCEDURE — 3331090001 HH PPS REVENUE CREDIT

## 2020-09-12 ENCOUNTER — HOME CARE VISIT (OUTPATIENT)
Dept: SCHEDULING | Facility: HOME HEALTH | Age: 85
End: 2020-09-12
Payer: MEDICARE

## 2020-09-12 VITALS
OXYGEN SATURATION: 98 % | TEMPERATURE: 98.8 F | HEART RATE: 76 BPM | DIASTOLIC BLOOD PRESSURE: 60 MMHG | SYSTOLIC BLOOD PRESSURE: 150 MMHG

## 2020-09-12 PROCEDURE — 3331090002 HH PPS REVENUE DEBIT

## 2020-09-12 PROCEDURE — 3331090001 HH PPS REVENUE CREDIT

## 2020-09-12 PROCEDURE — G0300 HHS/HOSPICE OF LPN EA 15 MIN: HCPCS

## 2020-09-13 VITALS
SYSTOLIC BLOOD PRESSURE: 138 MMHG | TEMPERATURE: 97.7 F | DIASTOLIC BLOOD PRESSURE: 56 MMHG | OXYGEN SATURATION: 98 % | HEART RATE: 98 BPM | RESPIRATION RATE: 18 BRPM

## 2020-09-13 PROCEDURE — 3331090001 HH PPS REVENUE CREDIT

## 2020-09-13 PROCEDURE — 3331090002 HH PPS REVENUE DEBIT

## 2020-09-14 ENCOUNTER — HOME CARE VISIT (OUTPATIENT)
Dept: SCHEDULING | Facility: HOME HEALTH | Age: 85
End: 2020-09-14
Payer: MEDICARE

## 2020-09-14 PROCEDURE — 3331090002 HH PPS REVENUE DEBIT

## 2020-09-14 PROCEDURE — G0151 HHCP-SERV OF PT,EA 15 MIN: HCPCS

## 2020-09-14 PROCEDURE — 3331090001 HH PPS REVENUE CREDIT

## 2020-09-15 ENCOUNTER — HOME CARE VISIT (OUTPATIENT)
Dept: SCHEDULING | Facility: HOME HEALTH | Age: 85
End: 2020-09-15
Payer: MEDICARE

## 2020-09-15 ENCOUNTER — TELEPHONE (OUTPATIENT)
Dept: FAMILY MEDICINE CLINIC | Age: 85
End: 2020-09-15

## 2020-09-15 PROCEDURE — 3331090002 HH PPS REVENUE DEBIT

## 2020-09-15 PROCEDURE — 3331090001 HH PPS REVENUE CREDIT

## 2020-09-15 PROCEDURE — G0300 HHS/HOSPICE OF LPN EA 15 MIN: HCPCS

## 2020-09-16 ENCOUNTER — HOME CARE VISIT (OUTPATIENT)
Dept: HOME HEALTH SERVICES | Facility: HOME HEALTH | Age: 85
End: 2020-09-16
Payer: MEDICARE

## 2020-09-16 ENCOUNTER — TELEPHONE (OUTPATIENT)
Dept: FAMILY MEDICINE CLINIC | Age: 85
End: 2020-09-16

## 2020-09-16 ENCOUNTER — HOME CARE VISIT (OUTPATIENT)
Dept: SCHEDULING | Facility: HOME HEALTH | Age: 85
End: 2020-09-16
Payer: MEDICARE

## 2020-09-16 VITALS
TEMPERATURE: 98.2 F | HEART RATE: 77 BPM | DIASTOLIC BLOOD PRESSURE: 70 MMHG | OXYGEN SATURATION: 98 % | SYSTOLIC BLOOD PRESSURE: 160 MMHG

## 2020-09-16 PROCEDURE — G0155 HHCP-SVS OF CSW,EA 15 MIN: HCPCS

## 2020-09-16 PROCEDURE — 3331090002 HH PPS REVENUE DEBIT

## 2020-09-16 PROCEDURE — G0151 HHCP-SERV OF PT,EA 15 MIN: HCPCS

## 2020-09-16 PROCEDURE — 3331090001 HH PPS REVENUE CREDIT

## 2020-09-16 NOTE — TELEPHONE ENCOUNTER
[address] [facility] [place] [phone] Physical therapist states that pt is in a lot of pain since her fall that she was seen in office for. She cant bare weight on her right leg and she would like to know if a mobile xray can be ordered. If mobile cant be ordered they would have to call EMS due to her being unable to walk. Pt is taking Extra strength tylenol every 6 hours as needed for pain. Correspondence will be with Pt.

## 2020-09-16 NOTE — TELEPHONE ENCOUNTER
Pt states that when her home nurse gets in she will call back to update to the company that they would like to use.

## 2020-09-17 ENCOUNTER — HOME CARE VISIT (OUTPATIENT)
Dept: SCHEDULING | Facility: HOME HEALTH | Age: 85
End: 2020-09-17
Payer: MEDICARE

## 2020-09-17 ENCOUNTER — APPOINTMENT (OUTPATIENT)
Dept: CT IMAGING | Age: 85
End: 2020-09-17
Attending: EMERGENCY MEDICINE
Payer: MEDICARE

## 2020-09-17 ENCOUNTER — HOME CARE VISIT (OUTPATIENT)
Dept: HOME HEALTH SERVICES | Facility: HOME HEALTH | Age: 85
End: 2020-09-17
Payer: MEDICARE

## 2020-09-17 ENCOUNTER — APPOINTMENT (OUTPATIENT)
Dept: GENERAL RADIOLOGY | Age: 85
End: 2020-09-17
Attending: EMERGENCY MEDICINE
Payer: MEDICARE

## 2020-09-17 ENCOUNTER — HOSPITAL ENCOUNTER (EMERGENCY)
Age: 85
Discharge: HOME | End: 2020-09-17
Attending: EMERGENCY MEDICINE
Payer: MEDICARE

## 2020-09-17 VITALS
SYSTOLIC BLOOD PRESSURE: 168 MMHG | HEART RATE: 74 BPM | TEMPERATURE: 97.8 F | BODY MASS INDEX: 23.16 KG/M2 | RESPIRATION RATE: 16 BRPM | HEIGHT: 65 IN | OXYGEN SATURATION: 95 % | DIASTOLIC BLOOD PRESSURE: 66 MMHG | WEIGHT: 139 LBS

## 2020-09-17 VITALS
TEMPERATURE: 98.9 F | DIASTOLIC BLOOD PRESSURE: 68 MMHG | OXYGEN SATURATION: 98 % | RESPIRATION RATE: 16 BRPM | HEART RATE: 71 BPM | SYSTOLIC BLOOD PRESSURE: 160 MMHG

## 2020-09-17 DIAGNOSIS — M16.10 HIP ARTHRITIS: Primary | ICD-10-CM

## 2020-09-17 PROCEDURE — G0300 HHS/HOSPICE OF LPN EA 15 MIN: HCPCS

## 2020-09-17 PROCEDURE — 72192 CT PELVIS W/O DYE: CPT

## 2020-09-17 PROCEDURE — 73502 X-RAY EXAM HIP UNI 2-3 VIEWS: CPT

## 2020-09-17 PROCEDURE — 3331090002 HH PPS REVENUE DEBIT

## 2020-09-17 PROCEDURE — 74011250637 HC RX REV CODE- 250/637: Performed by: EMERGENCY MEDICINE

## 2020-09-17 PROCEDURE — 99283 EMERGENCY DEPT VISIT LOW MDM: CPT

## 2020-09-17 PROCEDURE — 3331090001 HH PPS REVENUE CREDIT

## 2020-09-17 RX ORDER — ACETAMINOPHEN 500 MG
1000 TABLET ORAL
Status: COMPLETED | OUTPATIENT
Start: 2020-09-17 | End: 2020-09-17

## 2020-09-17 RX ADMIN — ACETAMINOPHEN 1000 MG: 500 TABLET ORAL at 13:48

## 2020-09-17 NOTE — ED NOTES
Pt able to ambulate to the bathroom with walker gait steady at this time pt states \"pain is a little better. \" Niece with pt.

## 2020-09-17 NOTE — TELEPHONE ENCOUNTER
If she fell and cannot bear weight, she really needs to go to ER for xray.   If she needs an ambulance for that, they should do that  [facility]

## 2020-09-17 NOTE — ED PROVIDER NOTES
80-year-old female presents after a ground-level fall in which she slipped on the tile floor because it was wet 1 week ago. She is complaining of right hip pain. She states that she is not walking well. She did not hit her head or lose consciousness. She is not on any blood thinners. She denies any other injuries. She has been taking Tylenol for pain. She denies any numbness or tingling. Hip Injury Fall Past Medical History:  
Diagnosis Date  Anemia  Breast cancer ([facility]  [facility])   
 left breast  
 Cataract  Chronic renal failure  Colonic polyp  Dementia ([facility]  [facility]  Eczema  Headache  Hyperlipemia  Hypothyroidism  Ill-defined condition   
 high cholesterol, hypothyroid  Osteopenia  Posterior vitreous detachment, left  Type 2 diabetes mellitus [facility]  UTI (urinary tract infection)  Vitamin D deficiency Past Surgical History:  
Procedure Laterality Date  BREAST SURGERY PROCEDURE UNLISTED    
 left breast lumpectomy  HX APPENDECTOMY  HX CATARACT REMOVAL    
 HX COLONOSCOPY  2009  HX GYN    
 hysterectomy [address] Left breast ,edial segmental mastectomy- 1992;  
 HX ORTHOPAEDIC    
 back Family History:  
Problem Relation Age of Onset  No Known Problems Mother  No Known Problems Father Social History Socioeconomic History  Marital status:  Spouse name: Not on file  Number of children: Not on file  Years of education: Not on file  Highest education level: Not on file Occupational History  Not on file Social Needs  Financial resource strain: Not on file  Food insecurity Worry: Not on file Inability: Not on file  Transportation needs Medical: Not on file Non-medical: Not on file Tobacco Use  Smoking status: Never Smoker  Smokeless tobacco: Never Used Substance and Sexual Activity  Alcohol use: No  
 Drug use: No  
 Sexual activity: Not Currently Lifestyle  Physical activity Days per week: Not on file Minutes per session: Not on file  Stress: Not on file Relationships  Social connections Talks on phone: Not on file Gets together: Not on file Attends [employer] witness service: Not on file Active member of club or organization: Not on file Attends meetings of clubs or organizations: Not on file Relationship status: Not on file  Intimate partner violence Fear of current or ex partner: Not on file Emotionally abused: Not on file Physically abused: Not on file Forced sexual activity: Not on file Other Topics Concern  Not on file Social History Narrative ** Merged History Encounter ** ALLERGIES: Patient has no known allergies. Review of Systems Unable to perform ROS: Dementia All other systems reviewed and are negative. There were no vitals filed for this visit. Physical Exam 
Vitals signs and nursing note reviewed. Constitutional:   
   General: She is not in acute distress. Comments: Elderly frail female HENT:  
   Head: Normocephalic and atraumatic. Mouth/Throat:  
   Mouth: Mucous membranes are moist.  
Eyes:  
   General: No scleral icterus. Conjunctiva/sclera: Conjunctivae normal.  
   Pupils: Pupils are equal, round, and reactive to light. Neck: Musculoskeletal: Neck supple. Trachea: No tracheal deviation. Cardiovascular:  
   Rate and Rhythm: Normal rate and regular rhythm. Pulses: Normal pulses. Pulmonary:  
   Effort: Pulmonary effort is normal. No respiratory distress. Breath sounds: No wheezing or rales. Abdominal:  
   General: There is no distension. Palpations: Abdomen is soft. Tenderness: There is no abdominal tenderness. Genitourinary: 
   Comments: deferred Musculoskeletal: General: Tenderness (right greater trochanter) present. No deformity. Skin: 
   General: Skin is warm and dry. Neurological:  
   General: No focal deficit present. Mental Status: She is alert. Mental status is at baseline. Psychiatric:     
   Mood and Affect: Mood normal.  
 
  
 
MDM Number of Diagnoses or Management Options Hip arthritis:  
Diagnosis management comments: 79 yo female presents after GLF one week ago with difficulty ambulating. Initial x-rays negative. CT ordered to rule out occult hip fracture which was negative. Offered admission to patient and caregiver for placement in rehab/ SNF. Declined admission and wished to be discharged immediately. Advised PCP follow-up. Given return precautions. ED Course as of Sep 22 0811 Thu Sep 17, 2020  
1441 Patient unable to ambulate and bear weight. CT of the pelvis was ordered. [TT] ED Course User Index [TT] Lizzeth Fierro MD  
 
 
Procedures

## 2020-09-17 NOTE — ED NOTES
Pt attempted to ambulate in room using a front wheeled walker. Pt having increased pain in right hip and thigh. Pt attempted to take a few steps pt unsteady and right foot rotated out to right. Dr Gustavo Hernandez made aware.

## 2020-09-17 NOTE — ED TRIAGE NOTES
Pt arrived to ER via [employer] from her home. Daughter on the way to ER. Guillermina Trujillo Per report \"pt had a GLF 1 week ago. Pt has been walking with pain in the right hip. \" Pt states \"I slipped. \" Dr Zackary Forrester in room.

## 2020-09-18 ENCOUNTER — PATIENT OUTREACH (OUTPATIENT)
Dept: CASE MANAGEMENT | Age: 85
End: 2020-09-18

## 2020-09-18 LAB
ALBUMIN SERPL-MCNC: 3.6 G/DL (ref 3.5–4.6)
ALBUMIN/GLOB SERPL: 1.5 {RATIO} (ref 1.2–2.2)
ALP SERPL-CCNC: 89 IU/L (ref 39–117)
ALT SERPL-CCNC: 7 IU/L (ref 0–32)
AST SERPL-CCNC: 18 IU/L (ref 0–40)
BASOPHILS # BLD AUTO: 0 X10E3/UL (ref 0–0.2)
BASOPHILS NFR BLD AUTO: 0 %
BILIRUB SERPL-MCNC: 0.3 MG/DL (ref 0–1.2)
BUN SERPL-MCNC: 10 MG/DL (ref 10–36)
BUN/CREAT SERPL: 8 (ref 12–28)
CALCIUM SERPL-MCNC: 8.4 MG/DL (ref 8.7–10.3)
CHLORIDE SERPL-SCNC: 109 MMOL/L (ref 96–106)
CO2 SERPL-SCNC: 23 MMOL/L (ref 20–29)
CREAT SERPL-MCNC: 1.31 MG/DL (ref 0.57–1)
EOSINOPHIL # BLD AUTO: 0.1 X10E3/UL (ref 0–0.4)
EOSINOPHIL NFR BLD AUTO: 1 %
ERYTHROCYTE [DISTWIDTH] IN BLOOD BY AUTOMATED COUNT: 27.3 % (ref 11.7–15.4)
FERRITIN SERPL-MCNC: 22 NG/ML (ref 15–150)
GLOBULIN SER CALC-MCNC: 2.4 G/DL (ref 1.5–4.5)
GLUCOSE SERPL-MCNC: 63 MG/DL (ref 65–99)
HCT VFR BLD AUTO: 24.7 % (ref 34–46.6)
HGB BLD-MCNC: 7.3 G/DL (ref 11.1–15.9)
IRON SATN MFR SERPL: 6 % (ref 15–55)
IRON SERPL-MCNC: 16 UG/DL (ref 27–139)
LYMPHOCYTES # BLD AUTO: 1.7 X10E3/UL (ref 0.7–3.1)
LYMPHOCYTES NFR BLD AUTO: 28 %
MCH RBC QN AUTO: 18.5 PG (ref 26.6–33)
MCHC RBC AUTO-ENTMCNC: 29.6 G/DL (ref 31.5–35.7)
MCV RBC AUTO: 63 FL (ref 79–97)
MONOCYTES # BLD AUTO: 0.6 X10E3/UL (ref 0.1–0.9)
MONOCYTES NFR BLD AUTO: 11 %
NEUTROPHILS # BLD AUTO: 3.7 X10E3/UL (ref 1.4–7)
NEUTROPHILS NFR BLD AUTO: 60 %
PLATELET # BLD AUTO: 377 X10E3/UL (ref 150–450)
POTASSIUM SERPL-SCNC: 3.3 MMOL/L (ref 3.5–5.2)
PROT SERPL-MCNC: 6 G/DL (ref 6–8.5)
RBC # BLD AUTO: 3.94 X10E6/UL (ref 3.77–5.28)
SODIUM SERPL-SCNC: 149 MMOL/L (ref 134–144)
TIBC SERPL-MCNC: 267 UG/DL (ref 250–450)
UIBC SERPL-MCNC: 251 UG/DL (ref 118–369)
WBC # BLD AUTO: 6.1 X10E3/UL (ref 3.4–10.8)

## 2020-09-18 PROCEDURE — 3331090001 HH PPS REVENUE CREDIT

## 2020-09-18 PROCEDURE — 3331090002 HH PPS REVENUE DEBIT

## 2020-09-18 NOTE — TELEPHONE ENCOUNTER
Called niece with labs. Low hemoglobin and iron. On iron already. Some black stools, no blood. Went to ER yesterday for fall, no fractures. To be seen in clinic Monday. Needs GI referral and if heme positive, need to go to hospital again.   [facility]

## 2020-09-19 VITALS
HEART RATE: 73 BPM | RESPIRATION RATE: 17 BRPM | SYSTOLIC BLOOD PRESSURE: 144 MMHG | DIASTOLIC BLOOD PRESSURE: 60 MMHG | OXYGEN SATURATION: 97 % | TEMPERATURE: 97.9 F

## 2020-09-19 PROCEDURE — 3331090001 HH PPS REVENUE CREDIT

## 2020-09-19 PROCEDURE — 3331090002 HH PPS REVENUE DEBIT

## 2020-09-20 VITALS
OXYGEN SATURATION: 97 % | HEART RATE: 70 BPM | DIASTOLIC BLOOD PRESSURE: 66 MMHG | SYSTOLIC BLOOD PRESSURE: 154 MMHG | RESPIRATION RATE: 16 BRPM | TEMPERATURE: 97.6 F

## 2020-09-20 PROCEDURE — 3331090002 HH PPS REVENUE DEBIT

## 2020-09-20 PROCEDURE — 3331090001 HH PPS REVENUE CREDIT

## 2020-09-21 ENCOUNTER — HOSPITAL ENCOUNTER (INPATIENT)
Age: 85
LOS: 2 days | Discharge: HOME HEALTH | DRG: 377 | End: 2020-09-23
Attending: STUDENT IN AN ORGANIZED HEALTH CARE EDUCATION/TRAINING PROGRAM | Admitting: FAMILY MEDICINE
Payer: MEDICARE

## 2020-09-21 ENCOUNTER — APPOINTMENT (OUTPATIENT)
Dept: GENERAL RADIOLOGY | Age: 85
DRG: 377 | End: 2020-09-21
Attending: STUDENT IN AN ORGANIZED HEALTH CARE EDUCATION/TRAINING PROGRAM
Payer: MEDICARE

## 2020-09-21 ENCOUNTER — OFFICE VISIT (OUTPATIENT)
Dept: FAMILY MEDICINE CLINIC | Age: 85
End: 2020-09-21
Payer: MEDICARE

## 2020-09-21 VITALS
HEIGHT: 65 IN | HEART RATE: 68 BPM | TEMPERATURE: 97.7 F | OXYGEN SATURATION: 100 % | DIASTOLIC BLOOD PRESSURE: 66 MMHG | BODY MASS INDEX: 23.16 KG/M2 | WEIGHT: 139 LBS | SYSTOLIC BLOOD PRESSURE: 99 MMHG | RESPIRATION RATE: 16 BRPM

## 2020-09-21 DIAGNOSIS — Z71.89 GOALS OF CARE, COUNSELING/DISCUSSION: ICD-10-CM

## 2020-09-21 DIAGNOSIS — K92.2 GASTROINTESTINAL HEMORRHAGE, UNSPECIFIED GASTROINTESTINAL HEMORRHAGE TYPE: ICD-10-CM

## 2020-09-21 DIAGNOSIS — I50.21 ACUTE SYSTOLIC HEART FAILURE (HCC): ICD-10-CM

## 2020-09-21 DIAGNOSIS — D64.9 SYMPTOMATIC ANEMIA: Primary | ICD-10-CM

## 2020-09-21 DIAGNOSIS — Z71.89 DNR (DO NOT RESUSCITATE) DISCUSSION: ICD-10-CM

## 2020-09-21 DIAGNOSIS — R53.83 FATIGUE, UNSPECIFIED TYPE: ICD-10-CM

## 2020-09-21 DIAGNOSIS — I95.1 ORTHOSTASIS: ICD-10-CM

## 2020-09-21 DIAGNOSIS — Z71.89 ADVANCED CARE PLANNING/COUNSELING DISCUSSION: ICD-10-CM

## 2020-09-21 DIAGNOSIS — E88.09 HYPOALBUMINEMIA: ICD-10-CM

## 2020-09-21 DIAGNOSIS — R19.5 HEME POSITIVE STOOL: Primary | ICD-10-CM

## 2020-09-21 DIAGNOSIS — D50.0 IRON DEFICIENCY ANEMIA DUE TO CHRONIC BLOOD LOSS: ICD-10-CM

## 2020-09-21 DIAGNOSIS — R06.02 SHORTNESS OF BREATH: ICD-10-CM

## 2020-09-21 DIAGNOSIS — I10 ESSENTIAL HYPERTENSION: ICD-10-CM

## 2020-09-21 DIAGNOSIS — E87.6 HYPOKALEMIA: ICD-10-CM

## 2020-09-21 PROBLEM — R82.90 ABNORMAL URINALYSIS: Status: RESOLVED | Noted: 2019-05-15 | Resolved: 2020-09-21

## 2020-09-21 PROBLEM — R51.9 HEADACHE: Status: RESOLVED | Noted: 2019-05-15 | Resolved: 2020-09-21

## 2020-09-21 PROBLEM — N39.0 UTI (URINARY TRACT INFECTION): Status: RESOLVED | Noted: 2019-05-15 | Resolved: 2020-09-21

## 2020-09-21 PROBLEM — Z86.73 HISTORY OF TRANSIENT ISCHEMIC ATTACK (TIA): Status: ACTIVE | Noted: 2019-05-15

## 2020-09-21 LAB
ALBUMIN SERPL-MCNC: 2.6 G/DL (ref 3.5–5)
ALBUMIN/GLOB SERPL: 0.8 {RATIO} (ref 1.1–2.2)
ALP SERPL-CCNC: 80 U/L (ref 45–117)
ALT SERPL-CCNC: 8 U/L (ref 12–78)
ANION GAP SERPL CALC-SCNC: 5 MMOL/L (ref 5–15)
ANION GAP SERPL CALC-SCNC: 8 MMOL/L (ref 5–15)
AST SERPL-CCNC: 10 U/L (ref 15–37)
BASOPHILS # BLD: 0 K/UL (ref 0–0.1)
BASOPHILS NFR BLD: 0 % (ref 0–1)
BILIRUB SERPL-MCNC: 0.3 MG/DL (ref 0.2–1)
BUN SERPL-MCNC: 12 MG/DL (ref 6–20)
BUN SERPL-MCNC: 14 MG/DL (ref 6–20)
BUN/CREAT SERPL: 10 (ref 12–20)
BUN/CREAT SERPL: 7 (ref 12–20)
CALCIUM SERPL-MCNC: 7.6 MG/DL (ref 8.5–10.1)
CALCIUM SERPL-MCNC: 7.8 MG/DL (ref 8.5–10.1)
CHLORIDE SERPL-SCNC: 110 MMOL/L (ref 97–108)
CHLORIDE SERPL-SCNC: 113 MMOL/L (ref 97–108)
CO2 SERPL-SCNC: 28 MMOL/L (ref 21–32)
CO2 SERPL-SCNC: 30 MMOL/L (ref 21–32)
COMMENT, HOLDF: NORMAL
CREAT SERPL-MCNC: 1.47 MG/DL (ref 0.55–1.02)
CREAT SERPL-MCNC: 1.64 MG/DL (ref 0.55–1.02)
DIFFERENTIAL METHOD BLD: ABNORMAL
EOSINOPHIL # BLD: 0 K/UL (ref 0–0.4)
EOSINOPHIL NFR BLD: 0 % (ref 0–7)
ERYTHROCYTE [DISTWIDTH] IN BLOOD BY AUTOMATED COUNT: 26.7 % (ref 11.5–14.5)
ERYTHROCYTE [DISTWIDTH] IN BLOOD BY AUTOMATED COUNT: 27.2 % (ref 11.5–14.5)
GLOBULIN SER CALC-MCNC: 3.4 G/DL (ref 2–4)
GLUCOSE BLD STRIP.AUTO-MCNC: 127 MG/DL (ref 65–100)
GLUCOSE SERPL-MCNC: 219 MG/DL (ref 65–100)
GLUCOSE SERPL-MCNC: 95 MG/DL (ref 65–100)
HCT VFR BLD AUTO: 26.4 % (ref 35–47)
HCT VFR BLD AUTO: 27.2 % (ref 35–47)
HEMOCCULT STL QL: POSITIVE
HGB BLD-MCNC: 7.1 G/DL (ref 11.5–16)
HGB BLD-MCNC: 7.2 G/DL (ref 11.5–16)
IMM GRANULOCYTES # BLD AUTO: 0 K/UL (ref 0–0.04)
IMM GRANULOCYTES NFR BLD AUTO: 0 % (ref 0–0.5)
INR PPP: 1.1 (ref 0.9–1.1)
LYMPHOCYTES # BLD: 0.9 K/UL (ref 0.8–3.5)
LYMPHOCYTES NFR BLD: 18 % (ref 12–49)
MAGNESIUM SERPL-MCNC: 2.1 MG/DL (ref 1.6–2.4)
MCH RBC QN AUTO: 18.7 PG (ref 26–34)
MCH RBC QN AUTO: 18.9 PG (ref 26–34)
MCHC RBC AUTO-ENTMCNC: 26.5 G/DL (ref 30–36.5)
MCHC RBC AUTO-ENTMCNC: 26.9 G/DL (ref 30–36.5)
MCV RBC AUTO: 70.2 FL (ref 80–99)
MCV RBC AUTO: 70.6 FL (ref 80–99)
MONOCYTES # BLD: 0.4 K/UL (ref 0–1)
MONOCYTES NFR BLD: 7 % (ref 5–13)
NEUTS SEG # BLD: 3.7 K/UL (ref 1.8–8)
NEUTS SEG NFR BLD: 75 % (ref 32–75)
NRBC # BLD: 0 K/UL (ref 0–0.01)
NRBC # BLD: 0 K/UL (ref 0–0.01)
NRBC BLD-RTO: 0 PER 100 WBC
NRBC BLD-RTO: 0 PER 100 WBC
PLATELET # BLD AUTO: 352 K/UL (ref 150–400)
PLATELET # BLD AUTO: 368 K/UL (ref 150–400)
PMV BLD AUTO: 9.5 FL (ref 8.9–12.9)
POTASSIUM SERPL-SCNC: 2.8 MMOL/L (ref 3.5–5.1)
POTASSIUM SERPL-SCNC: 3.2 MMOL/L (ref 3.5–5.1)
PROT SERPL-MCNC: 6 G/DL (ref 6.4–8.2)
PROTHROMBIN TIME: 11.6 SEC (ref 9–11.1)
RBC # BLD AUTO: 3.76 M/UL (ref 3.8–5.2)
RBC # BLD AUTO: 3.85 M/UL (ref 3.8–5.2)
RBC MORPH BLD: ABNORMAL
SAMPLES BEING HELD,HOLD: NORMAL
SERVICE CMNT-IMP: ABNORMAL
SODIUM SERPL-SCNC: 146 MMOL/L (ref 136–145)
SODIUM SERPL-SCNC: 148 MMOL/L (ref 136–145)
TROPONIN I SERPL-MCNC: <0.05 NG/ML
TSH SERPL DL<=0.05 MIU/L-ACNC: 2.45 UIU/ML (ref 0.36–3.74)
WBC # BLD AUTO: 5 K/UL (ref 3.6–11)
WBC # BLD AUTO: 6 K/UL (ref 3.6–11)

## 2020-09-21 PROCEDURE — 85025 COMPLETE CBC W/AUTO DIFF WBC: CPT

## 2020-09-21 PROCEDURE — 74011250636 HC RX REV CODE- 250/636: Performed by: STUDENT IN AN ORGANIZED HEALTH CARE EDUCATION/TRAINING PROGRAM

## 2020-09-21 PROCEDURE — 86923 COMPATIBILITY TEST ELECTRIC: CPT

## 2020-09-21 PROCEDURE — 74011250637 HC RX REV CODE- 250/637: Performed by: STUDENT IN AN ORGANIZED HEALTH CARE EDUCATION/TRAINING PROGRAM

## 2020-09-21 PROCEDURE — 83735 ASSAY OF MAGNESIUM: CPT

## 2020-09-21 PROCEDURE — G8427 DOCREV CUR MEDS BY ELIG CLIN: HCPCS | Performed by: FAMILY MEDICINE

## 2020-09-21 PROCEDURE — 65660000000 HC RM CCU STEPDOWN

## 2020-09-21 PROCEDURE — G8510 SCR DEP NEG, NO PLAN REQD: HCPCS | Performed by: FAMILY MEDICINE

## 2020-09-21 PROCEDURE — 82272 OCCULT BLD FECES 1-3 TESTS: CPT

## 2020-09-21 PROCEDURE — 1100F PTFALLS ASSESS-DOCD GE2>/YR: CPT | Performed by: FAMILY MEDICINE

## 2020-09-21 PROCEDURE — 3331090002 HH PPS REVENUE DEBIT

## 2020-09-21 PROCEDURE — 85027 COMPLETE CBC AUTOMATED: CPT

## 2020-09-21 PROCEDURE — 86900 BLOOD TYPING SEROLOGIC ABO: CPT

## 2020-09-21 PROCEDURE — 85610 PROTHROMBIN TIME: CPT

## 2020-09-21 PROCEDURE — 1090F PRES/ABSN URINE INCON ASSESS: CPT | Performed by: FAMILY MEDICINE

## 2020-09-21 PROCEDURE — C9113 INJ PANTOPRAZOLE SODIUM, VIA: HCPCS | Performed by: STUDENT IN AN ORGANIZED HEALTH CARE EDUCATION/TRAINING PROGRAM

## 2020-09-21 PROCEDURE — 71045 X-RAY EXAM CHEST 1 VIEW: CPT

## 2020-09-21 PROCEDURE — G8420 CALC BMI NORM PARAMETERS: HCPCS | Performed by: FAMILY MEDICINE

## 2020-09-21 PROCEDURE — 82962 GLUCOSE BLOOD TEST: CPT

## 2020-09-21 PROCEDURE — 84443 ASSAY THYROID STIM HORMONE: CPT

## 2020-09-21 PROCEDURE — 36415 COLL VENOUS BLD VENIPUNCTURE: CPT

## 2020-09-21 PROCEDURE — 83036 HEMOGLOBIN GLYCOSYLATED A1C: CPT

## 2020-09-21 PROCEDURE — G0444 DEPRESSION SCREEN ANNUAL: HCPCS | Performed by: FAMILY MEDICINE

## 2020-09-21 PROCEDURE — 99283 EMERGENCY DEPT VISIT LOW MDM: CPT

## 2020-09-21 PROCEDURE — 3331090001 HH PPS REVENUE CREDIT

## 2020-09-21 PROCEDURE — 80053 COMPREHEN METABOLIC PANEL: CPT

## 2020-09-21 PROCEDURE — 3288F FALL RISK ASSESSMENT DOCD: CPT | Performed by: FAMILY MEDICINE

## 2020-09-21 PROCEDURE — 84484 ASSAY OF TROPONIN QUANT: CPT

## 2020-09-21 PROCEDURE — 99213 OFFICE O/P EST LOW 20 MIN: CPT | Performed by: FAMILY MEDICINE

## 2020-09-21 PROCEDURE — G8536 NO DOC ELDER MAL SCRN: HCPCS | Performed by: FAMILY MEDICINE

## 2020-09-21 PROCEDURE — 93005 ELECTROCARDIOGRAM TRACING: CPT

## 2020-09-21 PROCEDURE — 99222 1ST HOSP IP/OBS MODERATE 55: CPT | Performed by: FAMILY MEDICINE

## 2020-09-21 RX ORDER — FLUDROCORTISONE ACETATE 0.1 MG/1
0.2 TABLET ORAL DAILY
Status: DISCONTINUED | OUTPATIENT
Start: 2020-09-22 | End: 2020-09-22

## 2020-09-21 RX ORDER — POLYETHYLENE GLYCOL 3350 17 G/17G
17 POWDER, FOR SOLUTION ORAL DAILY PRN
Status: DISCONTINUED | OUTPATIENT
Start: 2020-09-21 | End: 2020-09-23 | Stop reason: HOSPADM

## 2020-09-21 RX ORDER — INSULIN LISPRO 100 [IU]/ML
INJECTION, SOLUTION INTRAVENOUS; SUBCUTANEOUS
Status: DISCONTINUED | OUTPATIENT
Start: 2020-09-21 | End: 2020-09-23 | Stop reason: HOSPADM

## 2020-09-21 RX ORDER — SODIUM CHLORIDE 9 MG/ML
100 INJECTION, SOLUTION INTRAVENOUS CONTINUOUS
Status: DISCONTINUED | OUTPATIENT
Start: 2020-09-21 | End: 2020-09-22

## 2020-09-21 RX ORDER — SODIUM CHLORIDE 0.9 % (FLUSH) 0.9 %
5-40 SYRINGE (ML) INJECTION AS NEEDED
Status: DISCONTINUED | OUTPATIENT
Start: 2020-09-21 | End: 2020-09-23 | Stop reason: HOSPADM

## 2020-09-21 RX ORDER — MAGNESIUM SULFATE 100 %
4 CRYSTALS MISCELLANEOUS AS NEEDED
Status: DISCONTINUED | OUTPATIENT
Start: 2020-09-21 | End: 2020-09-23 | Stop reason: HOSPADM

## 2020-09-21 RX ORDER — LANOLIN ALCOHOL/MO/W.PET/CERES
325 CREAM (GRAM) TOPICAL 2 TIMES DAILY
Status: DISCONTINUED | OUTPATIENT
Start: 2020-09-21 | End: 2020-09-23 | Stop reason: HOSPADM

## 2020-09-21 RX ORDER — POTASSIUM CHLORIDE 750 MG/1
20 TABLET, FILM COATED, EXTENDED RELEASE ORAL
Status: COMPLETED | OUTPATIENT
Start: 2020-09-21 | End: 2020-09-21

## 2020-09-21 RX ORDER — SODIUM CHLORIDE 0.9 % (FLUSH) 0.9 %
5-40 SYRINGE (ML) INJECTION EVERY 8 HOURS
Status: DISCONTINUED | OUTPATIENT
Start: 2020-09-21 | End: 2020-09-23 | Stop reason: HOSPADM

## 2020-09-21 RX ORDER — SODIUM CHLORIDE 9 MG/ML
1000 INJECTION, SOLUTION INTRAVENOUS ONCE
Status: COMPLETED | OUTPATIENT
Start: 2020-09-21 | End: 2020-09-21

## 2020-09-21 RX ORDER — ATORVASTATIN CALCIUM 20 MG/1
40 TABLET, FILM COATED ORAL
Status: DISCONTINUED | OUTPATIENT
Start: 2020-09-21 | End: 2020-09-23 | Stop reason: HOSPADM

## 2020-09-21 RX ORDER — DEXTROSE 50 % IN WATER (D50W) INTRAVENOUS SYRINGE
25-50 AS NEEDED
Status: DISCONTINUED | OUTPATIENT
Start: 2020-09-21 | End: 2020-09-23 | Stop reason: HOSPADM

## 2020-09-21 RX ORDER — DOCUSATE SODIUM 100 MG/1
100 CAPSULE, LIQUID FILLED ORAL 2 TIMES DAILY
Status: DISCONTINUED | OUTPATIENT
Start: 2020-09-21 | End: 2020-09-23 | Stop reason: HOSPADM

## 2020-09-21 RX ORDER — LEVOTHYROXINE SODIUM 50 UG/1
50 TABLET ORAL DAILY
Status: DISCONTINUED | OUTPATIENT
Start: 2020-09-22 | End: 2020-09-23 | Stop reason: HOSPADM

## 2020-09-21 RX ADMIN — POTASSIUM CHLORIDE 20 MEQ: 750 TABLET, FILM COATED, EXTENDED RELEASE ORAL at 20:00

## 2020-09-21 RX ADMIN — SODIUM CHLORIDE 100 ML/HR: 900 INJECTION, SOLUTION INTRAVENOUS at 23:05

## 2020-09-21 RX ADMIN — Medication 10 ML: at 17:48

## 2020-09-21 RX ADMIN — POTASSIUM BICARBONATE 40 MEQ: 782 TABLET, EFFERVESCENT ORAL at 16:45

## 2020-09-21 RX ADMIN — PANTOPRAZOLE SODIUM 40 MG: 40 INJECTION, POWDER, FOR SOLUTION INTRAVENOUS at 21:38

## 2020-09-21 RX ADMIN — SODIUM CHLORIDE 1000 ML: 900 INJECTION, SOLUTION INTRAVENOUS at 16:45

## 2020-09-21 RX ADMIN — Medication 10 ML: at 21:38

## 2020-09-21 NOTE — PROGRESS NOTES
Vivi Donaldson is a 80 y.o. female who presents today with the following: HPI Chief Complaint Patient presents with  Follow-up FOR HER RECENT LABS 1. Heme positive stool Hemoglobin 7.3. Patient is here for lab follow-up and to get stool check. Caregiver reports dark black stools. Advised to hold Plavix until she is seen by GI. Caregiver reports patient has not taken her Plavix today yet. 2. Iron deficiency anemia due to chronic blood loss She is currently on iron supplement. Recently started. Caregiver reports that she has had black stools before starting iron. TIBC  267   250 - 450  ug/dL  Final   
UIBC  251   118 - 369  ug/dL  Final   
Iron  16  Low    27 - 139  ug/dL  Final   
Iron % saturation  6 HGB  7.3 3. Fatigue, unspecified type Caregiver reports worsening fatigue. Review of Systems Constitutional: Positive for malaise/fatigue. HENT: Negative. Eyes: Negative. Respiratory: Negative. Cardiovascular: Negative. Gastrointestinal: Negative. Genitourinary: Negative. Musculoskeletal: Negative. Skin: Negative. Neurological: Negative. Endo/Heme/Allergies: Negative. Psychiatric/Behavioral: Negative. Physical Exam 
Vitals signs and nursing note reviewed. HENT:  
   Head: Normocephalic and atraumatic. Right Ear: External ear normal.  
   Left Ear: External ear normal.  
Eyes:  
   Extraocular Movements: Extraocular movements intact. Conjunctiva/sclera: Conjunctivae normal.  
   Pupils: Pupils are equal, round, and reactive to light. Neck: Musculoskeletal: Normal range of motion. Thyroid: No thyromegaly. Cardiovascular:  
   Rate and Rhythm: Normal rate. Pulmonary:  
   Effort: Pulmonary effort is normal.  
Abdominal:  
   General: Bowel sounds are normal.  
   Palpations: Abdomen is soft. Genitourinary: 
   Rectum: Guaiac result positive. Musculoskeletal: Normal range of motion.   
Skin: 
 General: Skin is warm and dry. Neurological:  
   Mental Status: She is alert and oriented to person, place, and time. Psychiatric:     
   Mood and Affect: Mood and affect normal.  
 
 
BP 99/66 (BP 1 Location: Left arm, BP Patient Position: Sitting)   Pulse 68   Temp 97.7 °F (36.5 °C) (Oral)   Resp 16   Ht 5' 5\" (1.651 m)   Wt 139 lb (63 kg)   LMP  (LMP Unknown)   SpO2 100%   BMI 23.13 kg/m² No Known Allergies Current Outpatient Medications Medication Sig  
 acetaminophen (TYLENOL) 500 mg tablet Take 500 mg by mouth every six (6) hours as needed for Pain.  polyethylene glycol (MIRALAX) 17 gram packet Take 1 Packet by mouth daily.  ferrous sulfate 325 mg (65 mg iron) tablet Take 1 Tab by mouth two (2) times a day.  levothyroxine (SYNTHROID) 50 mcg tablet Take 1 tablet by mouth once daily  fludrocortisone (FLORINEF) 0.1 mg tablet Take 2 Tabs by mouth daily.  atorvastatin (LIPITOR) 40 mg tablet Take 1 tablet by mouth once daily  clopidogreL (PLAVIX) 75 mg tab Take 1 tablet by mouth once daily  glucose blood VI test strips (TRUE METRIX GLUCOSE TEST STRIP) strip USE 1 STRIP TO CHECK GLUCOSE ONCE DAILY, DM2 E11.9  
 docusate sodium (COLACE) 100 mg capsule Take 100 mg by mouth two (2) times a day. No current facility-administered medications for this visit. Past Medical History:  
Diagnosis Date  Anemia  [facility] ([facility]  [facility])   
 left breast  
 Cataract  Chronic renal failure  Colonic polyp  Dementia ([facility]  Diabetes ([facility]  Eczema  Headache  Hyperlipemia  Hypothyroidism  Ill-defined condition   
 high cholesterol, hypothyroid  Osteopenia  Posterior vitreous detachment, left  Type 2 diabetes mellitus ([facility]  UTI (urinary tract infection)  Vitamin D deficiency Past Surgical History:  
Procedure Laterality Date  BREAST SURGERY PROCEDURE UNLISTED    
 left breast lumpectomy  HX APPENDECTOMY  HX CATARACT REMOVAL    
 HX COLONOSCOPY  2009  HX GYN    
 hysterectomy [id number] Left breast ,edial segmental mastectomy- 1992;  
 HX ORTHOPAEDIC    
 back Problem List  Date Reviewed: 8/31/2020 Codes Class Noted TIA (transient ischemic attack) ICD-10-CM: G45.9 ICD-9-CM: 435.9  5/15/2019 Dysphasia ICD-10-CM: R47.02 
ICD-9-CM: 784.59  5/15/2019 Headache ICD-10-CM: R51 ICD-9-CM: 784.0  5/15/2019 Hypokalemia ICD-10-CM: E87.6 ICD-9-CM: 276.8  5/15/2019 Elevated serum creatinine ICD-10-CM: R79.89 ICD-9-CM: 790.99  5/15/2019 Stage 3 chronic kidney disease (HCC) ICD-10-CM: N18.3 ICD-9-CM: 585.3  5/15/2019 Microcytic anemia ICD-10-CM: D50.9 ICD-9-CM: 280.9  5/15/2019 Right facial numbness ICD-10-CM: R20.0 ICD-9-CM: 782.0  5/15/2019 Abnormal urinalysis ICD-10-CM: R82.90 ICD-9-CM: 791.9  5/15/2019 UTI (urinary tract infection) ICD-10-CM: N39.0 ICD-9-CM: 599.0  5/15/2019 Hypertension ICD-10-CM: I10 
ICD-9-CM: 401.9  5/15/2019 History of CVA (cerebrovascular accident) ICD-10-CM: Z86.73 
ICD-9-CM: V12.54  5/15/2019 Type 2 diabetes with nephropathy ([facility] ICD-10-CM: E11.21 
ICD-9-CM: 250.40, 583.81  10/2/2018 Orthostatic hypotension ICD-10-CM: I95.1 ICD-9-CM: 458.0  10/2/2018 Acquired hypothyroidism ICD-10-CM: E03.9 ICD-9-CM: 244.9  9/28/2018 Anemia ICD-10-CM: D64.9 ICD-9-CM: 285.9  9/28/2018 Atopic dermatitis ICD-10-CM: L20.9 ICD-9-CM: 691.8  9/28/2018 Callus ICD-10-CM: L84 
ICD-9-CM: 135  9/28/2018 CKD (chronic kidney disease), stage II ICD-10-CM: N18.2 ICD-9-CM: 585.2  9/28/2018 Dementia ([facility] ICD-10-CM: F03.90 ICD-9-CM: 294.20  9/28/2018 Type II diabetes mellitus ([facility] ICD-10-CM: E11.9 ICD-9-CM: 250.00  9/28/2018  Diabetic neuropathy with neurologic complication (HCC) FXP-[id number]: E11.40, E11.49 
 ICD-9-CM: 250.60, 357.2  9/28/2018 Essential hypertension ICD-10-CM: I10 
ICD-9-CM: 401.9  9/28/2018 History of breast cancer ICD-10-CM: Z85.3 ICD-9-CM: V10.3  9/28/2018 Hypercholesterolemia ICD-10-CM: E78.00 ICD-9-CM: 272.0  9/28/2018 Lymphedema ICD-10-CM: I89.0 ICD-9-CM: 457.1  9/28/2018 Memory loss ICD-10-CM: R41.3 ICD-9-CM: 780.93  9/28/2018 Osteopenia ICD-10-CM: M85.80 ICD-9-CM: 733.90  9/28/2018 No results found for this visit on 09/21/20. 1. Heme positive stool Hemoccult test positive in office on rectal exam.  Patient symptomatic and anemic. I will refer to ER now. 
 
- [address] 2. Iron deficiency anemia due to chronic blood loss 
 
- REFERRAL TO EMERGENCY DEPARTMENT 3. Fatigue, unspecified type 
 
- REFERRAL TO EMERGENCY DEPARTMENT Follow-up and Dispositions · Return if symptoms worsen or fail to improve. I ADVISED PATIENT TO GO TO ER IF SYMPTOMS WORSEN , CHANGE OR FAILS TO IMPROVE. I have discussed the diagnosis with the patient and the intended plan as seen in the above orders. The patient has received an after-visit summary and questions were answered concerning future plans. I have discussed medication side effects and warnings with the patient as well. The patient agrees and understands above plan.   
 
 
 
 
Ping Dutta MD

## 2020-09-21 NOTE — H&P
[address] [facility]  
Admission H&P Date of admission: 9/21/2020 Patient name: Alessandra Jackson MRN: [id number] YOB: 1925 Age: 80 y.o. Primary care provider:  Winston Fierro MD  
 
Source of Information: patient, medical records Chief complaint: Blood in stool, anemia History of Present Illness Alessandra Jackson is a 80 y.o. female with a PMH of Iron deficiency anemia, hypothyroidism, T2D, hyperlipidemia, CKD-3, orthostatic hypotension, osteopenia, h/o breast cancer s/p L mastectomy, h/o TIA, hypokalemia, dementia, chronic left arm lymphedema who presents to the ED after referral from her PCP for blood in stool in setting of anemia. Patient is a poor historian. First noticed a \"speck\" of blood in her stool 2 weeks ago and thinks she has seen it several times since. Describes stools as dark, denies bright red blood. Has chronic constipation and takes Colace and miralax daily, denies N/V/D. She denies h/o GI bleed but ED note mentions a prior transfusion. She does have iron deficieny anemia and reportedly takes iron supplements daily. Patient reports SOB and chest discomfort with exertion for several years that has gradually worsened recently (could not quantify timeframe) as well as nocturnal cough for 2 weeks. Of note, she was recently treated for PNA 9/7/20, L pleural effusion and PNA on CXR 8/31. She has dizziness related to orthostatic hypotension, improved with Florinef. Attempted to clarify history with Niece Buffy Doss) but could not reach her. COVID Questions:  
Experiencing any of the following symptoms: fever, chills, cough, SOB, diarrhea, URI symptoms. SOB, cough Any Sick contacts fever, cough, diarrhea, SOB, URI symptoms. No 
Traveled out of state or out of country. No 
 
In the ED:  
Vitals: 96.9, 119 bpm, 113/75, 16 RR, 100% RA Labs: Hgb 7.2, K+ 2.8, Glucose 219, Cr 1.64 
 Imaging: CXR 9/21 central congestion and left pleural effusion. Treatment: 40mEq Effer-K, NS 1L bolus Review of Systems Review of Systems Constitutional: Negative for fever. Respiratory: Positive for cough and shortness of breath (with exertion). Negative for hemoptysis. Cardiovascular: Positive for chest pain (with exertion) and orthopnea. Negative for palpitations and leg swelling. Gastrointestinal: Positive for abdominal pain (with movement), blood in stool and constipation (chronic). Negative for diarrhea, nausea and vomiting. Genitourinary: Negative for hematuria. Musculoskeletal: Positive for falls. Neurological: Positive for dizziness (orthostatic hypotension) and headaches (mild). Negative for weakness. Home Medications Prior to Admission medications Medication Sig Start Date End Date Taking? Authorizing Provider  
acetaminophen (TYLENOL) 500 mg tablet Take 500 mg by mouth every six (6) hours as needed for Pain. Provider, Historical  
polyethylene glycol (MIRALAX) 17 gram packet Take 1 Packet by mouth daily. 8/31/20   Jacquelyn Mcgill MD  
ferrous sulfate 325 mg (65 mg iron) tablet Take 1 Tab by mouth two (2) times a day. 8/31/20   Jacquelyn Mcgill MD  
levothyroxine (SYNTHROID) 50 mcg tablet Take 1 tablet by mouth once daily 8/25/20   Jacquelyn Mcgill MD  
fludrocortisone (FLORINEF) 0.1 mg tablet Take 2 Tabs by mouth daily. 7/7/20   Adryan Ervin NP  
atorvastatin (LIPITOR) 40 mg tablet Take 1 tablet by mouth once daily 6/25/20   Jacquelyn Mcgill MD  
clopidogreL (PLAVIX) 75 mg tab Take 1 tablet by mouth once daily 6/25/20   Jacquelyn Mcgill MD  
docusate sodium (COLACE) 100 mg capsule Take 100 mg by mouth two (2) times a day. Jacquelyn Mcgill MD  
glucose blood VI test strips (TRUE METRIX GLUCOSE TEST STRIP) strip USE 1 STRIP TO CHECK GLUCOSE ONCE DAILY, DM2 E11.9 9/11/19   Jacquelyn Mcgill MD  
 
 
Allergies No Known Allergies Past Medical History:  
Diagnosis Date  Abnormal urinalysis 5/15/2019  Anemia  Breast cancer ([facility]  Cancer [facility])   
 left breast  
 Cataract  Chronic renal failure  Colonic polyp  Dementia ([facility]  Diabetes ([facility]  Eczema  Headache  Hyperlipemia  Hypothyroidism  Ill-defined condition   
 high cholesterol, hypothyroid  Osteopenia  Posterior vitreous detachment, left  Type 2 diabetes mellitus ([facility]  UTI (urinary tract infection)  Vitamin D deficiency Past Surgical History:  
Procedure Laterality Date  BREAST SURGERY PROCEDURE UNLISTED    
 left breast lumpectomy  HX APPENDECTOMY  HX CATARACT REMOVAL    
 HX COLONOSCOPY  2009  HX GYN    
 hysterectomy [address] Left breast ,edial segmental mastectomy- 1992;  
 HX ORTHOPAEDIC    
 back Family History Problem Relation Age of Onset  No Known Problems Mother  No Known Problems Father Social History Patient resides 
x  Independently (recently fired caregiver?) With family care Assisted living SNF Ambulates Independently With cane    
x  Assisted walker Alcohol history  
x  None Social  
  Chronic Smoking history 
x  None Former smoker Current smoker Social History Tobacco Use Smoking Status Never Smoker Smokeless Tobacco Never Used Drug history 
x  None Former drug user Current drug user Code status Full code  
x  DNR/DNI Partial   
Code status discussed with the patient/caregivers. Physical Exam 
Visit Vitals /61 (BP 1 Location: Right arm, BP Patient Position: At rest) Pulse (!) 109 Temp 98.2 °F (36.8 °C) Resp 16 Ht 5' 5\" (1.651 m) Wt 139 lb (63 kg) LMP  (LMP Unknown) SpO2 95% BMI 23.13 kg/m² Physical Exam 
Constitutional:   
   Appearance: Normal appearance. She is normal weight.   
HENT:  
 Head: Normocephalic and atraumatic. Right Ear: External ear normal.  
   Left Ear: External ear normal.  
   Nose: Nose normal.  
   Mouth/Throat:  
   Mouth: Mucous membranes are moist.  
Eyes:  
   Extraocular Movements: Extraocular movements intact. Conjunctiva/sclera: Conjunctivae normal.  
Neck: Musculoskeletal: Normal range of motion and neck supple. Cardiovascular:  
   Rate and Rhythm: Tachycardia present. Rhythm irregular. Pulses: Normal pulses. Heart sounds: Murmur (1/6) present. Pulmonary:  
   Effort: Pulmonary effort is normal.  
   Breath sounds: Rales (bilateral fine ) present. No wheezing. Abdominal:  
   General: Abdomen is flat. Bowel sounds are normal. There is no distension. Palpations: Abdomen is soft. Tenderness: There is no abdominal tenderness. There is no guarding or rebound. Musculoskeletal: Normal range of motion. General: Swelling (Left arm lymphedema s/p mastectomy) present. Skin: 
   General: Skin is warm and dry. Capillary Refill: Capillary refill takes less than 2 seconds. Neurological:  
   Mental Status: She is oriented to person, place, and time. Psychiatric:  
   Comments: Mild dementia, poor historian Laboratory Data Recent Results (from the past 24 hour(s)) CBC WITH AUTOMATED DIFF Collection Time: 09/21/20  2:38 PM  
Result Value Ref Range WBC 5.0 3.6 - 11.0 K/uL  
 RBC 3.85 3.80 - 5.20 M/uL HGB 7.2 (L) 11.5 - 16.0 g/dL HCT 27.2 (L) 35.0 - 47.0 % MCV 70.6 (L) 80.0 - 99.0 FL  
 MCH 18.7 (L) 26.0 - 34.0 PG  
 MCHC 26.5 (L) 30.0 - 36.5 g/dL RDW 27.2 (H) 11.5 - 14.5 % PLATELET 318 887 - 310 K/uL MPV 9.5 8.9 - 12.9 FL  
 NRBC 0.0 0  WBC ABSOLUTE NRBC 0.00 0.00 - 0.01 K/uL NEUTROPHILS 75 32 - 75 % LYMPHOCYTES 18 12 - 49 % MONOCYTES 7 5 - 13 % EOSINOPHILS 0 0 - 7 % BASOPHILS 0 0 - 1 % IMMATURE GRANULOCYTES 0 0.0 - 0.5 % ABS. NEUTROPHILS 3.7 1.8 - 8.0 K/UL  
 ABS. LYMPHOCYTES 0.9 0.8 - 3.5 K/UL  
 ABS. MONOCYTES 0.4 0.0 - 1.0 K/UL  
 ABS. EOSINOPHILS 0.0 0.0 - 0.4 K/UL  
 ABS. BASOPHILS 0.0 0.0 - 0.1 K/UL  
 ABS. IMM. GRANS. 0.0 0.00 - 0.04 K/UL  
 DF SMEAR SCANNED    
 RBC COMMENTS HYPOCHROMIA 3+ 
    
 RBC COMMENTS MICROCYTOSIS 1+ 
    
 RBC COMMENTS SCHISTOCYTES 1+ TYPE & SCREEN Collection Time: 09/21/20  2:38 PM  
Result Value Ref Range Crossmatch Expiration 09/24/2020 ABO/Rh(D) B POSITIVE Antibody screen NEG PROTHROMBIN TIME + INR Collection Time: 09/21/20  2:38 PM  
Result Value Ref Range INR 1.1 0.9 - 1.1 Prothrombin time 11.6 (H) 9.0 - 11.1 sec METABOLIC PANEL, COMPREHENSIVE Collection Time: 09/21/20  2:38 PM  
Result Value Ref Range Sodium 146 (H) 136 - 145 mmol/L Potassium 2.8 (L) 3.5 - 5.1 mmol/L Chloride 110 (H) 97 - 108 mmol/L  
 CO2 28 21 - 32 mmol/L Anion gap 8 5 - 15 mmol/L Glucose 219 (H) 65 - 100 mg/dL BUN 12 6 - 20 MG/DL Creatinine 1.64 (H) 0.55 - 1.02 MG/DL  
 BUN/Creatinine ratio 7 (L) 12 - 20 GFR est AA 35 (L) >60 ml/min/1.73m2 GFR est non-AA 29 (L) >60 ml/min/1.73m2 Calcium 7.8 (L) 8.5 - 10.1 MG/DL Bilirubin, total 0.3 0.2 - 1.0 MG/DL  
 ALT (SGPT) 8 (L) 12 - 78 U/L  
 AST (SGOT) 10 (L) 15 - 37 U/L Alk. phosphatase 80 45 - 117 U/L Protein, total 6.0 (L) 6.4 - 8.2 g/dL Albumin 2.6 (L) 3.5 - 5.0 g/dL Globulin 3.4 2.0 - 4.0 g/dL A-G Ratio 0.8 (L) 1.1 - 2.2 MAGNESIUM Collection Time: 09/21/20  2:38 PM  
Result Value Ref Range Magnesium 2.1 1.6 - 2.4 mg/dL TROPONIN I Collection Time: 09/21/20  2:38 PM  
Result Value Ref Range Troponin-I, Qt. <0.05 <0.05 ng/mL SAMPLES BEING HELD Collection Time: 09/21/20  2:38 PM  
Result Value Ref Range SAMPLES BEING HELD 1SST,1RED COMMENT   Add-on orders for these samples will be processed based on acceptable specimen integrity and analyte stability, which may vary by analyte. Imaging Xr Chest [facility] Result Date: 9/21/2020 IMPRESSION: Central congestion with persistent left pleural effusion Assessment and Plan Todd Chau is a 80 y.o. female with a PMH of Iron deficiency anemia, hypothyroidism, T2D, hyperlipidemia, CKD-3, orthostatic hypotension, osteopenia, h/o breast cancer s/p L mastectomy, h/o TIA, hypokalemia, dementia, chronic left arm lymphedema who presents to the ED after referral from her PCP for blood in stool in setting of anemia.   
  
GI Bleeding: POA Hgb 7.2 (BL 8-9), Hgb 7.3 on 9/17. FOBT +. Reports no h/o abnormal colonoscopy, known in the last 10yrs. CT abd 9/17 shows colonic diverticulosis, no acute process. - Admit to Remote tele - Vital signs per unit protocol 
- NPO at midnight pending GI recs - Repeat FOBT 
- MIVF with NS at 100 cc/hr 
- Hold Home plavix - IV PPI BID 
- Type and screen, transfusion threshold <7.  
- Hgb/Hct every 6 hours - SCD 
- CBC/CMP daily - GI consulted, appreciate recs. Iron Deficiency Anemia: Most recent iron studies 9/17/2020. Per chart review non compliant and just restarted taking it. POA 7.2, BL Hgb ~9.  Per ED note, pt has received blood transfusion, pt denies. - Continue Iron 325mg po BID  
- Monitor with daily CBC Hypokalemia: POA 2.8. S/p 40mEq in ED. 
- EKG - Replete as needed - Monitor with daily CMP, Mg, PO4 
  
Chronic Constipation: Likely 2/2 to iron supplements. - Home Miralax daily and Colace BID 
 
CKD-3: Not followed by nephrology. POA Cr 1.64, BL Cr 1.6 
- Monitor with daily CMP 
- Avoid nephrotoxic drugs Orthopnea: CXR 8/31 Left  PNA vs atelectasis, no tx. CXR 9/21 L pleural effusion still present but improved. Persistent night time cough. Last echo 2019 LVEF 60-65%. - Echo Type 2 Diabetes: POA glucose 219. Diet controlled. A1c 5.2% in 5/2019 
[facility] - Hypoglycemic precautions ordered - A1c 
 
HTN: .75. Amlodipine d/c due to orthostatic hypotension. No home meds - Monitor BP Hypothyroidism: TSH 1.26 in 5/2019 
- Continue home synthroid 50mcg daily - Repeat TSH Orthostatic Hypotension 
- Continue Florinef 0.1mg po 2 tabs daily - Fall precautions 
  
H/o TIA: 05/2019. Does not take asprin at home, only plavix 
- Hold plavix 75mg po daily Hyperlipidemia:  
- Home atorvastatin 40mg qhs FEN/GI: Diabetic Diet. NPO at midnight.  ml/hr Activity: Ambulate with assistance. Fall precautions DVT prophylaxis: SCDs GI prophylaxis:  IV protonix Disposition: Plan to d/c to TBD. PT/OT/CM consulted POC: Bree Elizabeth ([phone]) CODE STATUS:  DNR Patient discussed with Dr. Anselmo Castillo [facility] Attending) Bernarda Day DO Family Medicine Resident Hospital Problems Hospital Problems  Date Reviewed: 8/31/2020 Codes Class Noted POA * (Principal) GI bleed ICD-10-CM: K92.2 ICD-9-CM: 578.9  9/21/2020 Yes Symptomatic anemia ICD-10-CM: D64.9 ICD-9-CM: 285.9  9/21/2020 Unknown History of transient ischemic attack (TIA) ICD-10-CM: Z21.25 
ICD-9-CM: V12.54  5/15/2019 Yes Hypokalemia ICD-10-CM: E87.6 ICD-9-CM: 276.8  5/15/2019 Yes Elevated serum creatinine ICD-10-CM: R79.89 ICD-9-CM: 790.99  5/15/2019 Yes Stage 3 chronic kidney disease (HCC) ICD-10-CM: N18.3 ICD-9-CM: 585.3  5/15/2019 Yes History of CVA (cerebrovascular accident) ICD-10-CM: Z86.73 
ICD-9-CM: V12.54  5/15/2019 Yes Acquired hypothyroidism ICD-10-CM: E03.9 ICD-9-CM: 244.9  9/28/2018 Yes Anemia ICD-10-CM: D64.9 ICD-9-CM: 285.9  9/28/2018 Yes Dementia ([address] ICD-10-CM: F03.90 ICD-9-CM: 294.20  9/28/2018 Yes Type II diabetes mellitus ([facility] ICD-10-CM: E11.9 ICD-9-CM: 250.00  9/28/2018 Yes  Diabetic neuropathy with neurologic complication (HCC) KHQ-99-UC: E11.40, E11.49 
ICD-9-CM: 250.60, 357.2  9/28/2018 Yes Essential hypertension ICD-10-CM: I10 
ICD-9-CM: 401.9  9/28/2018 Yes History of breast cancer ICD-10-CM: Z85.3 ICD-9-CM: V10.3  9/28/2018 Yes Hypercholesterolemia ICD-10-CM: E78.00 ICD-9-CM: 272.0  9/28/2018 Yes Osteopenia ICD-10-CM: M85.80 ICD-9-CM: 733.90  9/28/2018 Yes

## 2020-09-21 NOTE — ED PROVIDER NOTES
The patient is a 70-year-old female history of breast cancer, chronic renal failure, diabetes and dementia presenting today with abnormal lab values. She saw her PCP recently for fatigue and was noted to be anemic at 7.3. She is on iron chronically and has black stools at baseline however Hemoccult positive today in the office. She complains of some shortness of breath, dizziness with standing and generalized fatigue. She is on Plavix but no other blood thinners. No chest pain at this time but does have vague abdominal discomfort. She was sent today for further evaluation. She has received a blood transfusion in the past. 
 
 
  
 
Past Medical History:  
Diagnosis Date  Anemia  Breast cancer ([facility]  Cancer [facility])   
 left breast  
 Cataract  Chronic renal failure  Colonic polyp  Dementia ([facility]  Diabetes ([facility]  Eczema  Headache  Hyperlipemia  Hypothyroidism  Ill-defined condition   
 high cholesterol, hypothyroid  Osteopenia  Posterior vitreous detachment, left  Type 2 diabetes mellitus ([facility]  UTI (urinary tract infection)  Vitamin D deficiency Past Surgical History:  
Procedure Laterality Date  BREAST SURGERY PROCEDURE UNLISTED    
 left breast lumpectomy  HX APPENDECTOMY  HX CATARACT REMOVAL    
 HX COLONOSCOPY  2009  HX GYN    
 hysterectomy [address] Left breast ,edial segmental mastectomy- 1992;  
 HX ORTHOPAEDIC    
 back Family History:  
Problem Relation Age of Onset  No Known Problems Mother  No Known Problems Father Social History Socioeconomic History  Marital status:  Spouse name: Not on file  Number of children: Not on file  Years of education: Not on file  Highest education level: Not on file Occupational History  Not on file Social Needs  Financial resource strain: Not on file  Food insecurity Worry: Not on file Inability: Not on file  Transportation needs Medical: Not on file Non-medical: Not on file Tobacco Use  Smoking status: Never Smoker  Smokeless tobacco: Never Used Substance and Sexual Activity  Alcohol use: No  
 Drug use: No  
 Sexual activity: Not Currently Lifestyle  Physical activity Days per week: Not on file Minutes per session: Not on file  Stress: Not on file Relationships  Social connections Talks on phone: Not on file Gets together: Not on file Attends Mosque service: Not on file Active member of club or organization: Not on file Attends meetings of clubs or organizations: Not on file Relationship status: Not on file  Intimate partner violence Fear of current or ex partner: Not on file Emotionally abused: Not on file Physically abused: Not on file Forced sexual activity: Not on file Other Topics Concern  Not on file Social History Narrative ** Merged History Encounter ** ALLERGIES: Patient has no known allergies. Review of Systems Constitutional: Positive for fatigue. Negative for chills and fever. HENT: Negative for congestion and rhinorrhea. Eyes: Negative for redness and visual disturbance. Respiratory: Positive for shortness of breath. Negative for cough. Cardiovascular: Negative for chest pain and leg swelling. Gastrointestinal: Positive for abdominal pain. Negative for diarrhea, nausea and vomiting. +Black stools Genitourinary: Negative for dysuria, flank pain, frequency, hematuria and urgency. Musculoskeletal: Negative for arthralgias, back pain, myalgias and neck pain. Skin: Negative for rash and wound. Allergic/Immunologic: Negative for immunocompromised state. Neurological: Negative for dizziness and headaches. Vitals:  
 09/21/20 1334 BP: 113/75 Pulse: (!) 119 Resp: 16 Temp: 96.9 °F (36.1 °C) SpO2: 100% Weight: 63 kg (139 lb) Height: 5' 5\" (1.651 m) Physical Exam 
Vitals signs and nursing note reviewed. Constitutional:   
   General: She is not in acute distress. Appearance: She is well-developed. She is not diaphoretic. HENT:  
   Head: Normocephalic. Mouth/Throat:  
   Pharynx: No oropharyngeal exudate. Eyes:  
   General:     
   Right eye: No discharge. Left eye: No discharge. Pupils: Pupils are equal, round, and reactive to light. Comments: Conjunctival pallor Neck: Musculoskeletal: Normal range of motion and neck supple. Cardiovascular:  
   Rate and Rhythm: Regular rhythm. Tachycardia present. Heart sounds: Normal heart sounds. No murmur. No friction rub. No gallop. Pulmonary:  
   Effort: Pulmonary effort is normal. No respiratory distress. Breath sounds: Normal breath sounds. No stridor. No wheezing or rales. Abdominal:  
   General: Bowel sounds are normal. There is no distension. Palpations: Abdomen is soft. Tenderness: There is no abdominal tenderness. There is no guarding or rebound. Musculoskeletal: Normal range of motion. General: No deformity. Skin: 
   General: Skin is warm and dry. Capillary Refill: Capillary refill takes less than 2 seconds. Coloration: Skin is pale. Findings: No rash. Neurological:  
   Mental Status: She is alert and oriented to person, place, and time. Psychiatric:     
   Behavior: Behavior normal.  
 
  
Labs Reviewed: Anemic at 7.2 No leukocytosis Hypokalemic Baseline renal function Hyperglycemia 219 Troponin negative Imaging Reviewed:  
Chest x-ray shows central congestion with 
 
 
Course: 
1 L of IV fluids given Oral potassium given Perfect Serve Consult for Admission 3:25 PM 
 
ED Room Number: ZA48/74 Patient Name and age: Alejandra Buddhist 80 y.o.  female Working Diagnosis: 1. Symptomatic anemia 2. Gastrointestinal hemorrhage, unspecified gastrointestinal hemorrhage type 3. Hypokalemia COVID-19 Suspicion:  no 
Sepsis present:  no  Reassessment needed: no 
Code Status:  Full Code Readmission: no 
Isolation Requirements:  no 
Recommended Level of Care:  med/surg Department:[address] ED - [phone] Other:  80 y.o. female sent from Dr. Darron Bear office with symptomatic anemia, +heme stool, on plavix. MDM: 
72-year-old female presented today with referral of PCP for symptomatic anemia and blood in stools. She is on Plavix but no other anticoagulation. She is mildly tachycardic but otherwise hemodynamically stable. Abdomen is soft and nontender. Lab work shows hypokalemia as well as anemia. Hemoglobin over 7 therefore will hold on blood transfusion at this time. Will need evaluation by GI and family practice team.  Patient to be admitted for further management. Clinical Impression: ICD-10-CM ICD-9-CM 1. Symptomatic anemia  D64.9 285.9 2. Gastrointestinal hemorrhage, unspecified gastrointestinal hemorrhage type  K92.2 578.9 3. Hypokalemia  E87.6 276.8 Disposition: Admit Jose Rafael Phillips DO

## 2020-09-21 NOTE — PROGRESS NOTES
3:57 PM 
CM reviewed EMR and met with pt in room with masks on. Confirmed charted demographics. Of note, pt was seen at [facility] ED on 9/17 for hip pain following a GLF and was discharged home Reason for Admission:   GI bleed RUR Score:   51% PCP: First and Last name: Cecelia Santizo Name of Practice: 
 Are you a current patient: Yes/No: Yes Approximate date of last visit: two weeks ago Can you do a virtual visit with your PCP:  
          
Resources/supports as identified by patient/family:   Pt lives alone but has two caregivers (shifts) every day Top Challenges facing patient (as identified by patient/family and CM): Finances/Medication cost?    Has rx coverge Transportation? Family transports Support system or lack thereof? Has a niece who lives across the yard Living arrangements? One story home with no steps to enter Self-care/ADLs/Cognition? Requires assistance for ADL's Current Advanced Directive/Advance Care Plan:  Has ACP documents, Pt's nieceMelody Sender can be reached at [phone] Plan for utilizing home health:  Has caregivers 2 shifts per day; open with [facility] Transition of Care Plan:    Pt owns a RW, WC, toilet seat riser and a cane. She uses [facility] 1). Pt admitted for medical management 2). Family will transport at dc 
3). Resume [facility]- [facility] 4). CM will follow for dc needs Care Management Interventions PCP Verified by CM: Yes(last visit two weeks ago) Mode of Transport at Discharge: Other (see comment)(Family will be able to transport home) Transition of Care Consult (CM Consult): Discharge Planning Discharge Durable Medical Equipment: No 
Physical Therapy Consult: No 
Occupational Therapy Consult: No 
Speech Therapy Consult: No 
 Current Support Network: Has Personal Caregivers, Lives Alone Discharge Location Discharge Placement: Unable to determine at this time

## 2020-09-21 NOTE — ED TRIAGE NOTES
Patient presents with her niece who reports the patient has chronic black stools due to taking Iron- The patient had routine lab work at the end of last week and it showed a low Hgb. The patient had a stool test today which was Heme positive so she was referred to the ED. Patient denies and pain- reports she urinated 5 times this morning which had her concerned.

## 2020-09-21 NOTE — PROGRESS NOTES
Identified pt with two pt identifiers(name and ). Reviewed record in preparation for visit and have obtained necessary documentation. Chief Complaint Patient presents with  Follow-up FOR HER RECENT LABS Health Maintenance Due Topic  Foot Exam Q1   
 Eye Exam Retinal or Dilated  Bone Densitometry (Dexa) Screening  MICROALBUMIN Q1   
 Shingrix Vaccine Age 50> (2 of 2)  Medicare Yearly Exam   
 Lipid Screen  Flu Vaccine (1) Visit Vitals Blood Pressure 99/66 (BP 1 Location: Left arm, BP Patient Position: Sitting) Pulse 68 Temperature 97.7 °F (36.5 °C) (Oral) Respiration 16 Height 5' 5\" (1.651 m) Weight 139 lb (63 kg) Oxygen Saturation 100% Body Mass Index 23.13 kg/m² Coordination of Care Questionnaire: 
:  
1) Have you been to an emergency room, urgent care, or hospitalized since your last visit? YES  
 
 
2. Have seen or consulted any other health care provider since your last visit? [address] [facility] Patient is accompanied by  I have received verbal consent from Dennie Nanas to discuss any/all medical information while they are present in the room.

## 2020-09-21 NOTE — ED NOTES
TRANSFER - OUT REPORT: 
 
Verbal report given to Oliva Tripp RN(name) on Lesvia Ball  being transferred to 5th floor(unit) for routine progression of care Report consisted of patients Situation, Background, Assessment and  
Recommendations(SBAR). Information from the following report(s) SBAR, ED Summary, Intake/Output, MAR, Recent Results and Cardiac Rhythm Sinus tach was reviewed with the receiving nurse. Lines:  
Peripheral IV 09/21/20 Right Antecubital (Active) Opportunity for questions and clarification was provided. Patient transported with: 
 Monitor Registered Nurse

## 2020-09-21 NOTE — PROGRESS NOTES
Patient into a Fib RVR. Transfer orders in. EKG completed. TRANSFER - OUT REPORT: 
 
Verbal report given to PCC(name) on [facility]  being transferred to [facility](unit) for change in patient condition(AFIB with RVR) Report consisted of patients Situation, Background, Assessment and  
Recommendations(SBAR). Information from the following report(s) SBAR, Kardex, Procedure Summary, Intake/Output, MAR and Recent Results was reviewed with the receiving nurse. Lines:  
Peripheral IV 09/21/20 Right Antecubital (Active) Site Assessment Clean, dry, & intact 09/21/20 1759 Phlebitis Assessment 0 09/21/20 1759 Infiltration Assessment 0 09/21/20 1759 Dressing Status Clean, dry, & intact 09/21/20 1759 Dressing Type Transparent 09/21/20 1759 Hub Color/Line Status End cap changed 09/21/20 1759 Action Taken Open ports on tubing capped 09/21/20 1759 Alcohol Cap Used Yes 09/21/20 1759 Opportunity for questions and clarification was provided. Patient transported with: 
 Registered Nurse

## 2020-09-22 ENCOUNTER — HOME CARE VISIT (OUTPATIENT)
Dept: SCHEDULING | Facility: HOME HEALTH | Age: 85
End: 2020-09-22
Payer: MEDICARE

## 2020-09-22 ENCOUNTER — APPOINTMENT (OUTPATIENT)
Dept: NON INVASIVE DIAGNOSTICS | Age: 85
DRG: 377 | End: 2020-09-22
Attending: STUDENT IN AN ORGANIZED HEALTH CARE EDUCATION/TRAINING PROGRAM
Payer: MEDICARE

## 2020-09-22 ENCOUNTER — HOME CARE VISIT (OUTPATIENT)
Dept: HOME HEALTH SERVICES | Facility: HOME HEALTH | Age: 85
End: 2020-09-22
Payer: MEDICARE

## 2020-09-22 PROBLEM — I50.9 CHF (CONGESTIVE HEART FAILURE) (HCC): Status: ACTIVE | Noted: 2020-09-22

## 2020-09-22 LAB
ALBUMIN SERPL-MCNC: 2.4 G/DL (ref 3.5–5)
ALBUMIN/GLOB SERPL: 0.8 {RATIO} (ref 1.1–2.2)
ALP SERPL-CCNC: 72 U/L (ref 45–117)
ALT SERPL-CCNC: 8 U/L (ref 12–78)
ANION GAP SERPL CALC-SCNC: 7 MMOL/L (ref 5–15)
APPEARANCE UR: CLEAR
AST SERPL-CCNC: 12 U/L (ref 15–37)
ATRIAL RATE: 234 BPM
ATRIAL RATE: 87 BPM
BACTERIA URNS QL MICRO: NEGATIVE /HPF
BASOPHILS # BLD: 0.1 K/UL (ref 0–0.1)
BASOPHILS NFR BLD: 1 % (ref 0–1)
BILIRUB SERPL-MCNC: 0.3 MG/DL (ref 0.2–1)
BILIRUB UR QL: NEGATIVE
BNP SERPL-MCNC: ABNORMAL PG/ML
BUN SERPL-MCNC: 14 MG/DL (ref 6–20)
BUN/CREAT SERPL: 9 (ref 12–20)
CALCIUM SERPL-MCNC: 7.8 MG/DL (ref 8.5–10.1)
CALCULATED P AXIS, ECG09: 91 DEGREES
CALCULATED R AXIS, ECG10: 45 DEGREES
CALCULATED R AXIS, ECG10: 49 DEGREES
CALCULATED T AXIS, ECG11: -108 DEGREES
CALCULATED T AXIS, ECG11: 37 DEGREES
CHLORIDE SERPL-SCNC: 113 MMOL/L (ref 97–108)
CO2 SERPL-SCNC: 27 MMOL/L (ref 21–32)
COLOR UR: ABNORMAL
CREAT SERPL-MCNC: 1.5 MG/DL (ref 0.55–1.02)
DIAGNOSIS, 93000: NORMAL
DIAGNOSIS, 93000: NORMAL
DIFFERENTIAL METHOD BLD: ABNORMAL
ECHO AO ROOT DIAM: 2.78 CM
ECHO AV AREA PEAK VELOCITY: 0.9 CM2
ECHO AV AREA/BSA PEAK VELOCITY: 0.5 CM2/M2
ECHO AV PEAK GRADIENT: 9.33 MMHG
ECHO AV PEAK VELOCITY: 152.7 CM/S
ECHO EST RA PRESSURE: 15 MMHG
ECHO LA AREA 4C: 23.15 CM2
ECHO LA MAJOR AXIS: 3.63 CM
ECHO LA MINOR AXIS: 2.14 CM
ECHO LA VOL 2C: 102.58 ML (ref 22–52)
ECHO LA VOL 4C: 75.51 ML (ref 22–52)
ECHO LA VOL BP: 95.06 ML (ref 22–52)
ECHO LA VOL/BSA BIPLANE: 56.09 ML/M2 (ref 16–28)
ECHO LA VOLUME INDEX A2C: 60.53 ML/M2 (ref 16–28)
ECHO LA VOLUME INDEX A4C: 44.56 ML/M2 (ref 16–28)
ECHO LV E' LATERAL VELOCITY: 6.29 CENTIMETER/SECOND
ECHO LV E' SEPTAL VELOCITY: 4.79 CENTIMETER/SECOND
ECHO LV INTERNAL DIMENSION DIASTOLIC: 4.9 CM (ref 3.9–5.3)
ECHO LV INTERNAL DIMENSION SYSTOLIC: 4.08 CM
ECHO LV IVSD: 0.82 CM (ref 0.6–0.9)
ECHO LV MASS 2D: 167.8 G (ref 67–162)
ECHO LV MASS INDEX 2D: 99 G/M2 (ref 43–95)
ECHO LV POSTERIOR WALL DIASTOLIC: 1.11 CM (ref 0.6–0.9)
ECHO LVOT DIAM: 1.75 CM
ECHO LVOT PEAK GRADIENT: 1.31 MMHG
ECHO LVOT PEAK VELOCITY: 57.15 CM/S
ECHO MV A VELOCITY: 85.28 CENTIMETER/SECOND
ECHO MV E DECELERATION TIME (DT): 0.17 S
ECHO MV E VELOCITY: 107.64 CENTIMETER/SECOND
ECHO PV PEAK INSTANTANEOUS GRADIENT SYSTOLIC: 2.57 MMHG
ECHO PV REGURGITANT MAX VELOCITY: 80.02 CM/S
ECHO RIGHT VENTRICULAR SYSTOLIC PRESSURE (RVSP): 44.75 MMHG
ECHO RIGHT VENTRICULAR SYSTOLIC PRESSURE (RVSP): 45.4 MMHG
ECHO RIGHT VENTRICULAR SYSTOLIC PRESSURE (RVSP): 48.06 MMHG
ECHO RIGHT VENTRICULAR SYSTOLIC PRESSURE (RVSP): 50.92 MMHG
ECHO RIGHT VENTRICULAR SYSTOLIC PRESSURE (RVSP): 51.07 MMHG
ECHO RIGHT VENTRICULAR SYSTOLIC PRESSURE (RVSP): 51.39 MMHG
ECHO RIGHT VENTRICULAR SYSTOLIC PRESSURE (RVSP): 53.76 MMHG
ECHO RIGHT VENTRICULAR SYSTOLIC PRESSURE (RVSP): 53.8 MMHG
ECHO RIGHT VENTRICULAR SYSTOLIC PRESSURE (RVSP): 56.76 MMHG
ECHO RIGHT VENTRICULAR SYSTOLIC PRESSURE (RVSP): 58.49 MMHG
ECHO RIGHT VENTRICULAR SYSTOLIC PRESSURE (RVSP): 65.56 MMHG
ECHO RIGHT VENTRICULAR SYSTOLIC PRESSURE (RVSP): 65.56 MMHG
ECHO RV INTERNAL DIMENSION: 3.17 CM
ECHO RV TAPSE: 1.5 CM (ref 1.5–2)
ECHO TV REGURGITANT MAX VELOCITY: 355.53 CM/S
ECHO TV REGURGITANT PEAK GRADIENT: 29.75 MMHG
ECHO TV REGURGITANT PEAK GRADIENT: 30.4 MMHG
ECHO TV REGURGITANT PEAK GRADIENT: 33.06 MMHG
ECHO TV REGURGITANT PEAK GRADIENT: 35.92 MMHG
ECHO TV REGURGITANT PEAK GRADIENT: 36.07 MMHG
ECHO TV REGURGITANT PEAK GRADIENT: 36.39 MMHG
ECHO TV REGURGITANT PEAK GRADIENT: 38.76 MMHG
ECHO TV REGURGITANT PEAK GRADIENT: 38.8 MMHG
ECHO TV REGURGITANT PEAK GRADIENT: 41.76 MMHG
ECHO TV REGURGITANT PEAK GRADIENT: 43.49 MMHG
ECHO TV REGURGITANT PEAK GRADIENT: 50.56 MMHG
ECHO TV REGURGITANT PEAK GRADIENT: 50.56 MMHG
EOSINOPHIL # BLD: 0.1 K/UL (ref 0–0.4)
EOSINOPHIL NFR BLD: 2 % (ref 0–7)
EPITH CASTS URNS QL MICRO: ABNORMAL /LPF
ERYTHROCYTE [DISTWIDTH] IN BLOOD BY AUTOMATED COUNT: 26.3 % (ref 11.5–14.5)
ERYTHROCYTE [DISTWIDTH] IN BLOOD BY AUTOMATED COUNT: 26.7 % (ref 11.5–14.5)
ERYTHROCYTE [DISTWIDTH] IN BLOOD BY AUTOMATED COUNT: 26.7 % (ref 11.5–14.5)
EST. AVERAGE GLUCOSE BLD GHB EST-MCNC: ABNORMAL MG/DL
GLOBULIN SER CALC-MCNC: 3.1 G/DL (ref 2–4)
GLUCOSE BLD STRIP.AUTO-MCNC: 132 MG/DL (ref 65–100)
GLUCOSE BLD STRIP.AUTO-MCNC: 154 MG/DL (ref 65–100)
GLUCOSE BLD STRIP.AUTO-MCNC: 86 MG/DL (ref 65–100)
GLUCOSE BLD STRIP.AUTO-MCNC: 91 MG/DL (ref 65–100)
GLUCOSE SERPL-MCNC: 86 MG/DL (ref 65–100)
GLUCOSE UR STRIP.AUTO-MCNC: NEGATIVE MG/DL
HBA1C MFR BLD: <3.8 % (ref 4–5.6)
HCT VFR BLD AUTO: 25.5 % (ref 35–47)
HCT VFR BLD AUTO: 25.6 % (ref 35–47)
HCT VFR BLD AUTO: 26.9 % (ref 35–47)
HGB BLD-MCNC: 6.9 G/DL (ref 11.5–16)
HGB BLD-MCNC: 7 G/DL (ref 11.5–16)
HGB BLD-MCNC: 7.4 G/DL (ref 11.5–16)
HGB UR QL STRIP: NEGATIVE
HISTORY CHECKED?,CKHIST: NORMAL
HYALINE CASTS URNS QL MICRO: ABNORMAL /LPF (ref 0–5)
IMM GRANULOCYTES # BLD AUTO: 0 K/UL (ref 0–0.04)
IMM GRANULOCYTES NFR BLD AUTO: 0 % (ref 0–0.5)
KETONES UR QL STRIP.AUTO: NEGATIVE MG/DL
LA VOL DISK BP: 89.21 ML (ref 22–52)
LEUKOCYTE ESTERASE UR QL STRIP.AUTO: NEGATIVE
LYMPHOCYTES # BLD: 1.4 K/UL (ref 0.8–3.5)
LYMPHOCYTES NFR BLD: 26 % (ref 12–49)
MAGNESIUM SERPL-MCNC: 1.8 MG/DL (ref 1.6–2.4)
MCH RBC QN AUTO: 18.7 PG (ref 26–34)
MCH RBC QN AUTO: 19.2 PG (ref 26–34)
MCH RBC QN AUTO: 19.4 PG (ref 26–34)
MCHC RBC AUTO-ENTMCNC: 27 G/DL (ref 30–36.5)
MCHC RBC AUTO-ENTMCNC: 27.5 G/DL (ref 30–36.5)
MCHC RBC AUTO-ENTMCNC: 27.5 G/DL (ref 30–36.5)
MCV RBC AUTO: 69.4 FL (ref 80–99)
MCV RBC AUTO: 69.9 FL (ref 80–99)
MCV RBC AUTO: 70.6 FL (ref 80–99)
MONOCYTES # BLD: 0.7 K/UL (ref 0–1)
MONOCYTES NFR BLD: 12 % (ref 5–13)
NEUTS SEG # BLD: 3.2 K/UL (ref 1.8–8)
NEUTS SEG NFR BLD: 59 % (ref 32–75)
NITRITE UR QL STRIP.AUTO: NEGATIVE
NRBC # BLD: 0 K/UL (ref 0–0.01)
NRBC BLD-RTO: 0 PER 100 WBC
P-R INTERVAL, ECG05: 142 MS
PH UR STRIP: 6 [PH] (ref 5–8)
PHOSPHATE SERPL-MCNC: 2.8 MG/DL (ref 2.6–4.7)
PLATELET # BLD AUTO: 308 K/UL (ref 150–400)
PLATELET # BLD AUTO: 319 K/UL (ref 150–400)
PLATELET # BLD AUTO: 320 K/UL (ref 150–400)
PMV BLD AUTO: 9.5 FL (ref 8.9–12.9)
POTASSIUM SERPL-SCNC: 3.2 MMOL/L (ref 3.5–5.1)
PROT SERPL-MCNC: 5.5 G/DL (ref 6.4–8.2)
PROT UR STRIP-MCNC: 100 MG/DL
Q-T INTERVAL, ECG07: 320 MS
Q-T INTERVAL, ECG07: 354 MS
QRS DURATION, ECG06: 66 MS
QRS DURATION, ECG06: 68 MS
QTC CALCULATION (BEZET), ECG08: 425 MS
QTC CALCULATION (BEZET), ECG08: 425 MS
RBC # BLD AUTO: 3.65 M/UL (ref 3.8–5.2)
RBC # BLD AUTO: 3.69 M/UL (ref 3.8–5.2)
RBC # BLD AUTO: 3.81 M/UL (ref 3.8–5.2)
RBC #/AREA URNS HPF: ABNORMAL /HPF (ref 0–5)
RBC MORPH BLD: ABNORMAL
RETICS # AUTO: 0.05 M/UL (ref 0.02–0.08)
RETICS/RBC NFR AUTO: 1.3 % (ref 0.7–2.1)
SERVICE CMNT-IMP: ABNORMAL
SERVICE CMNT-IMP: ABNORMAL
SERVICE CMNT-IMP: NORMAL
SERVICE CMNT-IMP: NORMAL
SODIUM SERPL-SCNC: 147 MMOL/L (ref 136–145)
SP GR UR REFRACTOMETRY: 1.01 (ref 1–1.03)
UROBILINOGEN UR QL STRIP.AUTO: 0.2 EU/DL (ref 0.2–1)
VENTRICULAR RATE, ECG03: 106 BPM
VENTRICULAR RATE, ECG03: 87 BPM
WBC # BLD AUTO: 5.5 K/UL (ref 3.6–11)
WBC # BLD AUTO: 5.6 K/UL (ref 3.6–11)
WBC # BLD AUTO: 6.4 K/UL (ref 3.6–11)
WBC URNS QL MICRO: ABNORMAL /HPF (ref 0–4)

## 2020-09-22 PROCEDURE — 74011250636 HC RX REV CODE- 250/636: Performed by: STUDENT IN AN ORGANIZED HEALTH CARE EDUCATION/TRAINING PROGRAM

## 2020-09-22 PROCEDURE — 80053 COMPREHEN METABOLIC PANEL: CPT

## 2020-09-22 PROCEDURE — 3331090002 HH PPS REVENUE DEBIT

## 2020-09-22 PROCEDURE — 36415 COLL VENOUS BLD VENIPUNCTURE: CPT

## 2020-09-22 PROCEDURE — 99232 SBSQ HOSP IP/OBS MODERATE 35: CPT | Performed by: FAMILY MEDICINE

## 2020-09-22 PROCEDURE — 74011250637 HC RX REV CODE- 250/637: Performed by: STUDENT IN AN ORGANIZED HEALTH CARE EDUCATION/TRAINING PROGRAM

## 2020-09-22 PROCEDURE — 65660000000 HC RM CCU STEPDOWN

## 2020-09-22 PROCEDURE — 99223 1ST HOSP IP/OBS HIGH 75: CPT | Performed by: INTERNAL MEDICINE

## 2020-09-22 PROCEDURE — 82962 GLUCOSE BLOOD TEST: CPT

## 2020-09-22 PROCEDURE — 85045 AUTOMATED RETICULOCYTE COUNT: CPT

## 2020-09-22 PROCEDURE — 81001 URINALYSIS AUTO W/SCOPE: CPT

## 2020-09-22 PROCEDURE — 94760 N-INVAS EAR/PLS OXIMETRY 1: CPT

## 2020-09-22 PROCEDURE — 85025 COMPLETE CBC W/AUTO DIFF WBC: CPT

## 2020-09-22 PROCEDURE — C9113 INJ PANTOPRAZOLE SODIUM, VIA: HCPCS | Performed by: STUDENT IN AN ORGANIZED HEALTH CARE EDUCATION/TRAINING PROGRAM

## 2020-09-22 PROCEDURE — 83880 ASSAY OF NATRIURETIC PEPTIDE: CPT

## 2020-09-22 PROCEDURE — 93306 TTE W/DOPPLER COMPLETE: CPT

## 2020-09-22 PROCEDURE — 3331090001 HH PPS REVENUE CREDIT

## 2020-09-22 PROCEDURE — 85027 COMPLETE CBC AUTOMATED: CPT

## 2020-09-22 PROCEDURE — 2709999900 HC NON-CHARGEABLE SUPPLY

## 2020-09-22 PROCEDURE — 93005 ELECTROCARDIOGRAM TRACING: CPT

## 2020-09-22 PROCEDURE — 83735 ASSAY OF MAGNESIUM: CPT

## 2020-09-22 PROCEDURE — 84100 ASSAY OF PHOSPHORUS: CPT

## 2020-09-22 RX ORDER — SODIUM CHLORIDE 9 MG/ML
250 INJECTION, SOLUTION INTRAVENOUS AS NEEDED
Status: DISCONTINUED | OUTPATIENT
Start: 2020-09-22 | End: 2020-09-23 | Stop reason: HOSPADM

## 2020-09-22 RX ORDER — MIDODRINE HYDROCHLORIDE 5 MG/1
2.5 TABLET ORAL
Status: DISCONTINUED | OUTPATIENT
Start: 2020-09-23 | End: 2020-09-23

## 2020-09-22 RX ORDER — FLUDROCORTISONE ACETATE 0.1 MG/1
0.1 TABLET ORAL DAILY
Status: DISCONTINUED | OUTPATIENT
Start: 2020-09-23 | End: 2020-09-23

## 2020-09-22 RX ADMIN — Medication 10 ML: at 12:09

## 2020-09-22 RX ADMIN — POTASSIUM BICARBONATE 40 MEQ: 782 TABLET, EFFERVESCENT ORAL at 03:55

## 2020-09-22 RX ADMIN — FERROUS SULFATE TAB 325 MG (65 MG ELEMENTAL FE) 325 MG: 325 (65 FE) TAB at 10:19

## 2020-09-22 RX ADMIN — PANTOPRAZOLE SODIUM 40 MG: 40 INJECTION, POWDER, FOR SOLUTION INTRAVENOUS at 09:34

## 2020-09-22 RX ADMIN — DOCUSATE SODIUM 100 MG: 100 CAPSULE, LIQUID FILLED ORAL at 09:35

## 2020-09-22 RX ADMIN — ATORVASTATIN CALCIUM 40 MG: 20 TABLET, FILM COATED ORAL at 21:36

## 2020-09-22 RX ADMIN — SODIUM CHLORIDE 100 ML/HR: 900 INJECTION, SOLUTION INTRAVENOUS at 12:08

## 2020-09-22 RX ADMIN — LEVOTHYROXINE SODIUM 50 MCG: 0.05 TABLET ORAL at 10:19

## 2020-09-22 RX ADMIN — FERROUS SULFATE TAB 325 MG (65 MG ELEMENTAL FE) 325 MG: 325 (65 FE) TAB at 21:36

## 2020-09-22 NOTE — PROGRESS NOTES
1945 TRANSFER - IN REPORT: 
 
Verbal report received from Deshaun Hunter on Radha Burnette  being received from 5th floor for change in patient condition(at fib) Report consisted of patients Situation, Background, Assessment and  
Recommendations(SBAR). Information from the following report(s) SBAR, Kardex, ED Summary, Intake/Output, MAR, Accordion, Recent Results, Med Rec Status and Cardiac Rhythm at fib was reviewed with the receiving nurse. Opportunity for questions and clarification was provided. Assessment completed upon patients arrival to unit and care assumed. 3020 Primary Nurse Sergio Enriquez RN and Luc Leos RN performed a dual skin assessment on this patient No impairment noted Camden score is 17. 
0355 K 3.2 , EFFER-K  40 meq given. 9485 Bedside shift change report given to TANISHA [facility] .  Report included the following information SBAR, Kardex, ED Summary, Intake/Output, MAR, Accordion, Recent Results, Med Rec Status and Cardiac Rhythm SR.

## 2020-09-22 NOTE — PROGRESS NOTES
[address] [facility] PROGRAM 
PROGRESS NOTE  
 
9/22/2020 PCP: Abdiel Lott MD  
 
Assessment/Plan:  
 
Hemanth Kim is a 80 y.o. female with a PMH of Iron deficiency anemia, hypothyroidism, T2D, hyperlipidemia, CKD-3, orthostatic hypotension, osteopenia, h/o breast cancer s/p L mastectomy, h/o TIA, hypokalemia, dementia, chronic left arm lymphedema who presents to the ED after referral from her PCP for blood in stool in setting of anemia. 24 hour events: New onset a fib w/o RVR. Hgb 6.9 
 
  
GI Bleeding: POA Hgb 7.2 (BL 8-9), Hgb 7.3 on 9/17. FOBT +. Reports no h/o abnormal colonoscopy, known in the last 10yrs. CT abd 9/17 shows colonic diverticulosis, no acute process. - GI consulted, appreciate recs. - NPO pending GI recs - MIVF with NS at 100 cc/hr 
- Hold Home plavix  
- IV PPI BID 
- Type and screen, transfusion threshold <7. Transfuse one unit today with CBC 2hr post. 
- Hgb/Hct every 6 hours - CBC/CMP daily - Reticulocyte count ordered New-onset Afib: No h/o afib per chart review and patient. Noted on tele w/ spontaneous conversion. Irregular rhythm noted on PE at admission. EKG shows sinus rhythm with PVCs. - Cardiac monitoring - No anticoagulation at this time - Cardiology c/s, appreciate recs 
- Consider rate control with dilt vs labetalol if HR sustained > 120 Iron Deficiency Anemia: Most recent iron studies 9/17/2020. Per chart review non compliant and just restarted taking it. POA 7.2, BL Hgb ~9.  Per ED note, pt has received blood transfusion, pt denies. - Continue Iron 325mg po BID  
- Monitor with daily CBC 
  
Hypokalemia: POA 2.8 
- Replete as needed - Monitor with daily CMP, Mg, PO4 
  
Chronic Constipation: Likely 2/2 to iron supplements. - Home Miralax daily and Colace BID 
  
CKD-3: Not followed by nephrology.   POA Cr 1.64, BL Cr 1.6 
- Monitor with daily CMP 
- Avoid nephrotoxic drugs 
  
 Orthopnea: CXR 8/31 Left  PNA vs atelectasis, no tx. CXR 9/21 L pleural effusion still present but improved. Persistent night time cough. Last echo 2019 LVEF 60-65%, NRWMA. - Echo pending 
  
Type 2 Diabetes: POA glucose 219. Diet controlled. A1c 5.2% in 5/2019 
- SSI with ACHS glucose checks - Hypoglycemic precautions ordered - A1c 
  
HTN: .75. Amlodipine d/c due to orthostatic hypotension. No home meds - Monitor BP Hypothyroidism: TSH 2.45 
- Continue home synthroid 50mcg daily 
  
Orthostatic Hypotension 
- Continue Florinef 0.1mg po 2 tabs daily - Fall precautions 
  
H/o TIA: 05/2019. Does not take asprin at home, only plavix 
- Hold plavix 75mg po daily 
  
Hyperlipidemia:  
- Home atorvastatin 40mg qhs 
  
  
  
FEN/GI: NPO.  ml/hr Activity: Ambulate with assistance. Fall precautions DVT prophylaxis: SCDs GI prophylaxis:  IV protonix Disposition: Plan to d/c to TBD. PT/OT/CM consulted POC: Nitin Walker ([phone]) 
  
CODE STATUS:  DNR Kelly Galvez discussed with Dr. Seth Chang. Subjective: Pt was seen and examined at bedside. Afebrile and hemodynamically stable. Concerns overnight include new onset afib. Reports episodic chest discomfort with palpitations. Denies SOB, nausea, vomiting, abdominal pain, dizziness. Objective:  
Physical examination Patient Vitals for the past 24 hrs: 
 Temp Pulse Resp BP SpO2  
09/22/20 0804 97.9 °F (36.6 °C) 81 15 (!) 149/78 100 % 09/22/20 0701  78     
09/22/20 0700  78 17 (!) 162/73 100 % 09/22/20 0600  74 14 (!) 147/65 100 % 09/22/20 0541  76 14  97 % 09/22/20 0540  (!) 120 15  97 % 09/22/20 0500 98.5 °F (36.9 °C) (!) 110 13 (!) 141/91 94 % 09/22/20 0413  (!) 109 16 (!) 127/103   
09/22/20 0300  (!) 106 12 (!) 153/86 99 % 09/22/20 0200  (!) 102 13 (!) 145/104 99 % 09/22/20 0100  (!) 101 15 (!) 149/93 99 % 09/22/20 0002  (!) 101 19  97 % 20 2341  (!) 108 14 (!) 140/84 98 %  
20 2300  (!) 119 10  98 %  
20 2200  (!) 111 15 132/82 98 %  
20 2145  (!) 112 15 125/73 100 % 20 2130  (!) 109 13 (!) 144/92 99 % 20  (!) 111 14 (!) 147/77 97 % 20  (!) 112 15 (!) 141/96 97 % 20  (!) 106 14 (!) 136/91 100 % 20  (!) 117 15 (!) 150/83 100 % 20  (!) 109 16 (!) 150/98 100 % 20 1950 97.9 °F (36.6 °C) (!) 114 16 (!) 148/67 98 %  
20  (!) 111 10 (!) 106/91   
20 194  (!) 110 15    
20 1728 97.3 °F (36.3 °C) (!) 111 16 139/84 99 % 20 1652  (!) 109 16    
20 1645    136/63   
20 1630 98.2 °F (36.8 °C) (!) 109 16 128/61 95 % 20 1628     100 % 20 1623     100 % 20 1621    130/61   
20 1334 96.9 °F (36.1 °C) (!) 119 16 113/75 100 % Temp (24hrs), Av.8 °F (36.6 °C), Min:96.9 °F (36.1 °C), Max:98.5 °F (36.9 °C) O2 Device: Room air Date 20 - 20 1569 20 - 20 5044 Shift [id number] 1559-1320 24 Hour Total 4172-4463 3779-8969 24 Hour Total  
INTAKE  
P.O. 300  300     
  P. O. 300  300     
I. V.(mL/kg/hr)  691.7(0.9) 691.7(0.5) 100  100 Volume (0.9% sodium chloride infusion)  691.7 691.7 100  100 Shift Total(mL/kg) 300(4.8) 691. 7(10.9) 991. 7(15.6) 100(1.6)  100(1.6) OUTPUT Urine(mL/kg/hr)  200(0.3) 200(0.1) Urine Voided  200 200 Shift Total(mL/kg)  200(3.2) 200(3.2)  491.7 791.7 100  100 Weight (kg) 63 63.4 63.4 63.4 63.4 63.4 General: No acute distress. Alert. Cooperative. Mildly demented. Head: Normocephalic. Atraumatic. Eyes: Conjunctiva pink. Sclera white. PERRL. Neck: Supple. Normal ROM. No stiffness. Respiratory: CTAB. No w/r/r/c. 
Cardiovascular: Irregularly irregular. Normal H4,E4. 1/6 systolic murmur. GI: + bowel sounds. Nontender. No rebound tenderness or guarding. Nondistended. Extremities: TraceLE edema. Distal pulses present. Skin: Warm, dry. No rashes. Data Review: CBC: 
Recent Labs  
  09/22/20 
0355 09/21/20 
2304 09/21/20 
1438 WBC 5.5 6.0 5.0 HGB 6.9* 7.1* 7.2* HCT 25.6* 26.4* 27.2*  
 352 368 Metabolic Panel: 
Recent Labs  
  09/22/20 
0355 09/21/20 
2304 09/21/20 
1438 * 148* 146*  
K 3.2* 3.2* 2.8*  
* 113* 110* CO2 27 30 28 BUN 14 14 12 CREA 1.50* 1.47* 1.64* GLU 86 95 219* CA 7.8* 7.6* 7.8*  
MG 1.8  --  2.1 PHOS 2.8  --   --   
ALB 2.4*  --  2.6* TBILI 0.3  --  0.3 ALT 8*  --  8* INR  --   --  1.1 Micro: 
Results ** No results found for the last 336 hours. ** Imaging: Xr Chest Pa Lat Result Date: 9/7/2020 EXAM: XR CHEST PA LAT INDICATION: Shortness of breath. Left breast carcinoma, diabetes, and dimension. COMPARISON: None TECHNIQUE: PA and lateral chest views FINDINGS: Cardiac silhouette is large. Aortic arch is atherosclerotic but not enlarged. The pulmonary vasculature is within normal limits. Left lung base opacity is heterogeneous and includes a left pleural effusion. No pneumothorax. Left hilar bronchi have mural thickening. Possible right perihilar airspace opacity in the lower lobe. The visualized bones and upper abdomen are age-appropriate. IMPRESSION: Left lower lobe pneumonia in the proper clinical setting. Small left pleural effusion. Possible right lower lobe pneumonia versus atelectasis. Left bronchitis is age indeterminate. No pneumothorax. Recommend comparison with previous chest imaging. If unavailable or if findings are new, recommend followup PA and lateral chest views in 8-10 weeks to ensure resolution. Xr Hip Rt W Or Wo Pelv 2-3 Vws Result Date: 9/17/2020 EXAM: XR HIP RT W OR WO PELV 2-3 VWS INDICATION: fall. COMPARISON: None. FINDINGS: AP view of the pelvis and a frogleg lateral view of the right hip demonstrate no fracture, dislocation or other acute abnormality. Mild degenerative disease of the hips. Advanced degenerative changes of the lower lumbar spine. IMPRESSION: No acute abnormality. Ct Pelv Wo Cont Result Date: 9/17/2020 EXAM: CT PELV WO CONT INDICATION: rule out occult hip fracture COMPARISON: None. CONTRAST: None. TECHNIQUE: CT pelvis without contrast. Multislice helical CT of the pelvis was performed from the iliac crest to the symphysis pubis without intravenous contrast administration. Oral contrast was not administered. Coronal and sagittal reformations were generated. CT dose reduction was achieved through use of a standardized protocol tailored for this examination and automatic exposure control for dose modulation. Adaptive statistical iterative reconstruction (ASIR) was utilized. Lack of intravenous contrast limits evaluation of the vasculature and solid organs. FINDINGS: Bowel: No dilation or wall thickening identified. Colonic diverticula noted. Reproductive organs: Within normal limits. Soft tissues: No pelvic mass or lymphadenopathy. Diffuse soft tissue edema. Fluid: No ascites or drainable fluid collection Bones: No fracture or aggressive bone lesion. Diffuse osteopenia P Mild osteoarthritic change of the hips and lower lumbar degenerative spine change. Miscellaneous: N/A IMPRESSION: No acute fracture identified. Osteopenia, osteoarthritis of the hips, lower lumbar degenerative line change, and no aggressive lesion. Broad soft tissue edema. Colonic diverticulosis. Xr Chest [place] Result Date: 9/21/2020 EXAM: XR CHEST PORT INDICATION: Shortness of breath COMPARISON: 9/7/2020 FINDINGS: A portable AP radiograph of the chest was obtained at 1528 hours. The cardiomediastinal silhouette is normal. A left pleural effusion persist. There is mild central congestion.  Bilateral shoulder osteoarthritis is present. IMPRESSION: Central congestion with persistent left pleural effusion [facility] Medications reviewed Current Facility-Administered Medications Medication Dose Route Frequency  0.9% sodium chloride infusion 250 mL  250 mL IntraVENous PRN  
 sodium chloride (NS) flush 5-40 mL  5-40 mL IntraVENous Q8H  
 sodium chloride (NS) flush 5-40 mL  5-40 mL IntraVENous PRN  polyethylene glycol (MIRALAX) packet 17 g  17 g Oral DAILY PRN  
 docusate sodium (COLACE) capsule 100 mg  100 mg Oral BID  ferrous sulfate tablet 325 mg  325 mg Oral BID  levothyroxine (SYNTHROID) tablet 50 mcg  50 mcg Oral DAILY  glucose chewable tablet 16 g  4 Tab Oral PRN  
 dextrose (D50W) injection syrg 12.5-25 g  25-50 mL IntraVENous PRN  
 glucagon (GLUCAGEN) injection 1 mg  1 mg IntraMUSCular PRN  
 insulin lispro (HUMALOG) injection   SubCUTAneous AC&HS  fludrocortisone (FLORINEF) tablet 0.2 mg  0.2 mg Oral DAILY  pantoprazole (PROTONIX) 40 mg in 0.9% sodium chloride 10 mL injection  40 mg IntraVENous Q12H  
 atorvastatin (LIPITOR) tablet 40 mg  40 mg Oral QHS  
 0.9% sodium chloride infusion  100 mL/hr IntraVENous CONTINUOUS Signed: 
 Dipika Davidson DO Resident, Family Medicine

## 2020-09-22 NOTE — HOME CARE
Please note that this patient was open to [address] [place] [address] services at the time of hospital admission. If services will be needed at discharge, please order to resume them. Thank you, Leighann Madrigal RN, [phone]

## 2020-09-22 NOTE — PROGRESS NOTES
1915-Bedside and Verbal shift change report given to Too Godoy RN (oncoming nurse) by Bre Shrestha RN  (offgoing nurse). Report included the following information SBAR, Kardex, ED Summary, OR Summary, Procedure Summary, Intake/Output, Med Rec Status and Cardiac Rhythm Sinus [place]. 2300-Bedside and Verbal shift change report given to Nory Maldonado RN (oncoming nurse) by Too Godoy RN (offgoing nurse). Report included the following information SBAR, Kardex, ED Summary, OR Summary, Procedure Summary, Intake/Output, MAR and Med Rec Status.

## 2020-09-22 NOTE — PROGRESS NOTES
6:39 AM 
 
Patient with new Afib seen on telemetry. HR in the 100s. Ordered EKG. Patient spontaneously converted back to sinus rhythm. Will continue to monitor Richie Morales MD

## 2020-09-22 NOTE — PROGRESS NOTES
Physical Therapy Consult received, chart reviewed and RN consulted. PT attempted to see patient earlier today but at that time she was attempting to leave AMA. RN suggested trying again this afternoon, accompanied this therapist into the room. Upon arrival, patient sitting in chair fully dressed. She immediately declined once PT was introduced and she was informed the purpose of the visit. She reports she does not want to walk (although RN notes she has been trying to get up and leave) and says she has not eaten since yesterday. RN reminded patient the MD cleared her to eat earlier today but she declined food when offered. Visitor arrived at this time and patient then completely ignored PT and RN. Will defer evaluation at this time, return tomorrow to complete as appropriate.  
 
Artemio Hassan MS, PT

## 2020-09-22 NOTE — PROGRESS NOTES
[address] [facility] PROGRAM 
PROGRESS NOTE  
 
9/23/2020 PCP: Sejal López MD  
 
Assessment/Plan:  
 
Sathish Oh is a 80 y.o. female with a PMH of Iron deficiency anemia, hypothyroidism, T2D, hyperlipidemia, CKD-3, orthostatic hypotension, osteopenia, h/o breast cancer s/p L mastectomy, h/o TIA, hypokalemia, dementia, chronic left arm lymphedema who presents to the ED after referral from her PCP for blood in stool in setting of anemia. 0730: Patient would like to leave without further intervention. I spoke to her niece (stephanie) this morning about her wishes in regards to further testing given suspected amyloidosis per below. Niece understands this disease if identified would not be curable and requests symptomatic management only. Palliative has not seen patient yet. The niece confirms that she does live walking distance from the patient and that home health is already arranged to come 3d/wk for 4 hours. Patient does not want additional assistance arranged at discharge. 24 hour events: None GI Bleeding: POA Hgb 7.2 (BL 8.5), Hgb 7.3 on 9/17. FOBT +. Reports no h/o abnormal colonoscopy, none in the last 10yrs. CT abd 9/17 shows colonic diverticulosis, no acute process. Reticulocyte count normal. 
- GI consulted, appreciate recs: DDx AVM, malignancy, diverticular bleed, other - Consider EGD and colonoscopy pending palliative discussion of care goals given cardiac findings below - Continue IV PPI BID 
    - Hold home plavix - Diet as tolerated - MIVF  
- Hgb/Hct every 6 hours, transfusion threshold <7.  
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 Multiple sx (new onset afib, GI bleed, orthostasis, proteinuria, LV dysfunction). - Cardiology following 
     - Next steps include free light chains, urine protein immunofixation, cardiac MRI 
- Palliative c/s Acute Systolic CHF: Echo 5/76 LVEF 35-40%, wall thickness, G3DD. BNP > 10,000. Echo 5/2019 LVEF 60%, NRWMA. -Cardiology following, appreciate recs - IV bumex 1mg once today, d/c on oral bumex if tolerated - Consider starting BB, ACEI or ARB outpatient. New-onset Paroxysmal Afib: Noted on tele w/ spontaneous conversion. EKG SR with PVCs. [id number] 15.2% stroke/TIA/embolism, HAS-BLED >10% risk. - Cardiology following, appreciate recs - Cardiac monitoring - No anticoagulation at this time Orthostatic Hypotension: Associated with frequent falls. Hoem Florinef 0.2mg po daily. Per cardiology, Florinef contraindicated with LV dysfunction. 
- Taper Florinef to 0.1mg for 9/23-9/30 before discontinuing  
- start Midodrine 2.5mg po TID  
- Fall precautions Iron Deficiency Anemia: Most recent iron studies 9/17/2020. Per chart review non compliant and just restarted taking it. POA 7.2, BL Hgb ~9.  Per ED note, pt has received blood transfusion, pt denies. - Continue Iron 325mg po BID  
- Monitor with daily CBC 
  
Hypokalemia: POA 2.8 
- Replete as needed - Monitor with daily CMP, Mg, PO4 
  
Chronic Constipation: Likely 2/2 to iron supplements. - Home Miralax daily and Colace BID 
  
CKD-3: Not followed by nephrology. POA Cr 1.64, BL Cr 1.6 
- Monitor with daily CMP 
- Avoid nephrotoxic drugs 
  
Orthopnea: 2/2 to new onset HF. Echo 9/22 LVEF 35-40%. CXR 9/21 L pleural effusion still present but improved from 8/31.  
- Diurese per plan above  
  
Type 2 Diabetes: POA glucose 219. Diet controlled. A1c 5.2% in 5/2019 
- SSI with ACHS glucose checks - Hypoglycemic precautions ordered - A1c unable to calculate due to anemia 
  
HTN: .75. Amlodipine d/c due to orthostatic hypotension. No home meds - Monitor BP Hypothyroidism: TSH 2.45 
- Continue home synthroid 50mcg daily 
  
H/o TIA: 05/2019.   Does not take asprin at home, only plavix 
- Hold plavix 75mg po daily 
  
 Hyperlipidemia:  
- Home atorvastatin 40mg qhs 
  
  
FEN/GI: Cardiac diet Activity: Ambulate with assistance. Fall precautions DVT prophylaxis: SCDs GI prophylaxis:  IV protonix Disposition: Plan to d/c to TBD. PT/OT/CM consulted POC: Bronwyn Cunningham ([phone]) 
  
CODE STATUS:  DNR Rosario Johnson discussed with Dr. Patricia Rivera. Subjective: Pt was seen and examined at bedside. Afebrile and hemodynamically stable. Patient would like to leave now, does not want further testing. Reports that she has a caregiver at home and her niece lives beside her, does not want home health. Tolerating oral diet. Denies CP, SOB, nausea, vomiting, abdominal pain, dizziness. Objective:  
Physical examination Patient Vitals for the past 24 hrs: 
 Temp Pulse Resp BP SpO2  
20 0024 98.1 °F (36.7 °C) 79 14 (!) 164/77 96 %  
20 2027 98 °F (36.7 °C) 78 16 (!) 148/69 97 % 20 1626 97.8 °F (36.6 °C) 80 16 (!) 161/71 100 % 20 1147 98 °F (36.7 °C) 80 15 (!) 160/86 100 % 20 0941    (!) 149/78   
20 0900  75 14 (!) 171/92 100 % 20 0804 97.9 °F (36.6 °C) 81 15 (!) 149/78 100 % 20 0701  78     
20 0700  78 17 (!) 162/73 100 % 20 0600  74 14 (!) 147/65 100 % Temp (24hrs), Av °F (36.7 °C), Min:97.8 °F (36.6 °C), Max:98.1 °F (36.7 °C) O2 Device: Room air Date 20 - 20 5396 20 - 20 4423 Shift [id number] 24 Hour Total 5078-0221 2527-4702 24 Hour Total  
INTAKE  
P.O. 360 100 460     
  P. O. 360 100 460     
I. V.(mL/kg/hr) 100(0.1)  100 Volume (0.9% sodium chloride infusion) 100  100 Shift Total(mL/kg) 460(7.3) 100(1.6) 560(8.9) OUTPUT Shift Total(mL/kg)  100 560 Weight (kg) 63 63 63 63 63 63 General: No acute distress. Alert. Mildly demented at baseline. Frustrated. Head: Normocephalic. Atraumatic. Eyes: Conjunctiva pink. Sclera white. PERRL. Neck: Supple. Normal ROM. No stiffness. Respiratory: CTAB. No w/r/r/c. 
Cardiovascular: Irregularly irregular. Normal L7,M3. 1/6 systolic murmur. GI: + bowel sounds. Nontender. No rebound tenderness or guarding. Nondistended. Extremities: 2+ LE edema. Distal pulses present but diminished 1+. Skin: Warm, dry. No rashes. Data Review: CBC: 
Recent Labs  
  09/22/20 2229 09/22/20 
0941 09/22/20 
0355 WBC 6.4 5.6 5.5 HGB 7.4* 7.0* 6.9*  
HCT 26.9* 25.5* 25.6*  
 308 320 Metabolic Panel: 
Recent Labs  
  09/22/20 
0355 09/21/20 
2304 09/21/20 
1438 * 148* 146*  
K 3.2* 3.2* 2.8*  
* 113* 110* CO2 27 30 28 BUN 14 14 12 CREA 1.50* 1.47* 1.64* GLU 86 95 219* CA 7.8* 7.6* 7.8*  
MG 1.8  --  2.1 PHOS 2.8  --   --   
ALB 2.4*  --  2.6* TBILI 0.3  --  0.3 ALT 8*  --  8* INR  --   --  1.1 Micro: 
Results ** No results found for the last 336 hours. ** Imaging: Xr Chest Pa Lat Result Date: 9/7/2020 EXAM: XR CHEST PA LAT INDICATION: Shortness of breath. Left breast carcinoma, diabetes, and dimension. COMPARISON: None TECHNIQUE: PA and lateral chest views FINDINGS: Cardiac silhouette is large. Aortic arch is atherosclerotic but not enlarged. The pulmonary vasculature is within normal limits. Left lung base opacity is heterogeneous and includes a left pleural effusion. No pneumothorax. Left hilar bronchi have mural thickening. Possible right perihilar airspace opacity in the lower lobe. The visualized bones and upper abdomen are age-appropriate. IMPRESSION: Left lower lobe pneumonia in the proper clinical setting. Small left pleural effusion. Possible right lower lobe pneumonia versus atelectasis. Left bronchitis is age indeterminate. No pneumothorax. Recommend comparison with previous chest imaging.  If unavailable or if findings are new, recommend followup PA and lateral chest views in 8-10 weeks to ensure resolution. Xr Hip Rt W Or Wo Pelv 2-3 Vws Result Date: 9/17/2020 EXAM: XR HIP RT W OR WO PELV 2-3 VWS INDICATION: fall. COMPARISON: None. FINDINGS: AP view of the pelvis and a frogleg lateral view of the right hip demonstrate no fracture, dislocation or other acute abnormality. Mild degenerative disease of the hips. Advanced degenerative changes of the lower lumbar spine. IMPRESSION: No acute abnormality. Ct Pelv Wo Cont Result Date: 9/17/2020 EXAM: CT PELV WO CONT INDICATION: rule out occult hip fracture COMPARISON: None. CONTRAST: None. TECHNIQUE: CT pelvis without contrast. Multislice helical CT of the pelvis was performed from the iliac crest to the symphysis pubis without intravenous contrast administration. Oral contrast was not administered. Coronal and sagittal reformations were generated. CT dose reduction was achieved through use of a standardized protocol tailored for this examination and automatic exposure control for dose modulation. Adaptive statistical iterative reconstruction (ASIR) was utilized. Lack of intravenous contrast limits evaluation of the vasculature and solid organs. FINDINGS: Bowel: No dilation or wall thickening identified. Colonic diverticula noted. Reproductive organs: Within normal limits. Soft tissues: No pelvic mass or lymphadenopathy. Diffuse soft tissue edema. Fluid: No ascites or drainable fluid collection Bones: No fracture or aggressive bone lesion. Diffuse osteopenia P Mild osteoarthritic change of the hips and lower lumbar degenerative spine change. Miscellaneous: N/A IMPRESSION: No acute fracture identified. Osteopenia, osteoarthritis of the hips, lower lumbar degenerative line change, and no aggressive lesion. Broad soft tissue edema. Colonic diverticulosis. Xr Chest [facility] Result Date: 9/21/2020 EXAM: XR CHEST PORT INDICATION: Shortness of breath COMPARISON: 9/7/2020 FINDINGS: A portable AP radiograph of the chest was obtained at 1528 hours. The cardiomediastinal silhouette is normal. A left pleural effusion persist. There is mild central congestion. Bilateral shoulder osteoarthritis is present. IMPRESSION: Central congestion with persistent left pleural effusion Procedures Echo 9/22: 
Result status: Final result · LV: Estimated LVEF is 35 - 40%. Visually measured ejection fraction. Normal cavity size and systolic function (ejection fraction normal). Upper normal wall thickness. Severe (grade 3) left ventricular diastolic dysfunction. · LA: Mildly dilated left atrium. · RA: Mildly dilated right atrium. · MV: Mitral valve thickening. Mitral valve leaflet calcification. Moderate mitral valve regurgitation is present. · TV: Moderate to severe tricuspid valve regurgitation is present. · PA: Pulmonary hypertension. · IVC: Severely elevated central venous pressure (15+ mmHg); IVC diameter is larger than 21 mm and collapses less than 50% with respiration. · Pericardium: Small pericardial effusion adjacent to right atrium. There is left pleural effusion. · Findings concerning for cardiac amyloidosis Medications reviewed Current Facility-Administered Medications Medication Dose Route Frequency  0.9% sodium chloride infusion 250 mL  250 mL IntraVENous PRN  
 fludrocortisone (FLORINEF) tablet 0.1 mg  0.1 mg Oral DAILY  midodrine (PROAMATINE) tablet 2.5 mg  2.5 mg Oral TID WITH MEALS  sodium chloride (NS) flush 5-40 mL  5-40 mL IntraVENous Q8H  
 sodium chloride (NS) flush 5-40 mL  5-40 mL IntraVENous PRN  polyethylene glycol (MIRALAX) packet 17 g  17 g Oral DAILY PRN  
 docusate sodium (COLACE) capsule 100 mg  100 mg Oral BID  ferrous sulfate tablet 325 mg  325 mg Oral BID  levothyroxine (SYNTHROID) tablet 50 mcg  50 mcg Oral DAILY  glucose chewable tablet 16 g  4 Tab Oral PRN  
  dextrose (D50W) injection syrg 12.5-25 g  25-50 mL IntraVENous PRN  
 glucagon (GLUCAGEN) injection 1 mg  1 mg IntraMUSCular PRN  
 insulin lispro (HUMALOG) injection   SubCUTAneous AC&HS  pantoprazole (PROTONIX) 40 mg in 0.9% sodium chloride 10 mL injection  40 mg IntraVENous Q12H  
 atorvastatin (LIPITOR) tablet 40 mg  40 mg Oral QHS Signed: 
 Priscila Garg DO Resident, Family Medicine

## 2020-09-22 NOTE — CONSULTS
[facility]. [facility] Bonnielee Boas NP 
[phone] GASTROENTEROLOGY CONSULTATION NOTE 
   
 
 
 
 
NAME:  Todd Chau :   1925 MRN:   [id number] Referring Physician:  
Cr Haas Consult Date:  
2020 4:55 PM 
 
Chief Complaint:   
Anemia, Heme positive stools History of Present Illness:   
Patient is a 80 y. o. who we have been asked to see in consultation for the above complaint. The patient presented from home with complaints of intermittent rectal bleeding described as spots of blood in her stools. Associated symptoms include constipation. There has been no abdominal pain. She reports having a colonoscopy in the past though is unsure of when it was done. She has history of CKD and DM. Has been on iron supplements as outpatient. PMH: 
Past Medical History:  
Diagnosis Date  Abnormal urinalysis 5/15/2019  Anemia  Breast cancer ([address]  Cancer [facility])   
 left breast  
 Cataract  Chronic renal failure  Colonic polyp  Dementia ([address]  Diabetes ([address]  Eczema  Headache  Hyperlipemia  Hypothyroidism  Ill-defined condition   
 high cholesterol, hypothyroid  Osteopenia  Posterior vitreous detachment, left  Type 2 diabetes mellitus ([address]  UTI (urinary tract infection)  Vitamin D deficiency PSH: 
Past Surgical History:  
Procedure Laterality Date  BREAST SURGERY PROCEDURE UNLISTED    
 left breast lumpectomy  HX APPENDECTOMY  HX CATARACT REMOVAL    
 HX COLONOSCOPY    HX GYN    
 hysterectomy [address] [facility] Left breast ,edial segmental mastectomy- ;  
 HX ORTHOPAEDIC    
 back Allergies: 
No Known Allergies Home Medications: 
Prior to Admission Medications Prescriptions Last Dose Informant Patient Reported? Taking?  
acetaminophen (TYLENOL) 500 mg tablet   Yes Yes Sig: Take 500 mg by mouth every six (6) hours as needed for Pain. atorvastatin (LIPITOR) 40 mg tablet   No Yes Sig: Take 1 tablet by mouth once daily  
clopidogreL (PLAVIX) 75 mg tab   No Yes Sig: Take 1 tablet by mouth once daily  
docusate sodium (COLACE) 100 mg capsule   Yes Yes Sig: Take 100 mg by mouth two (2) times a day. ferrous sulfate 325 mg (65 mg iron) tablet   No Yes Sig: Take 1 Tab by mouth two (2) times a day. fludrocortisone (FLORINEF) 0.1 mg tablet   No Yes Sig: Take 2 Tabs by mouth daily. glucose blood VI test strips (TRUE METRIX GLUCOSE TEST STRIP) strip   No Yes Sig: USE 1 STRIP TO CHECK GLUCOSE ONCE DAILY, DM2 E11.9  
levothyroxine (SYNTHROID) 50 mcg tablet   No Yes Sig: Take 1 tablet by mouth once daily  
polyethylene glycol (MIRALAX) 17 gram packet   No Yes Sig: Take 1 Packet by mouth daily. Patient taking differently: Take 17 g by mouth two (2) times daily as needed. Facility-Administered Medications: None Hospital Medications: 
Current Facility-Administered Medications Medication Dose Route Frequency  0.9% sodium chloride infusion 250 mL  250 mL IntraVENous PRN  
 sodium chloride (NS) flush 5-40 mL  5-40 mL IntraVENous Q8H  
 sodium chloride (NS) flush 5-40 mL  5-40 mL IntraVENous PRN  polyethylene glycol (MIRALAX) packet 17 g  17 g Oral DAILY PRN  
 docusate sodium (COLACE) capsule 100 mg  100 mg Oral BID  ferrous sulfate tablet 325 mg  325 mg Oral BID  levothyroxine (SYNTHROID) tablet 50 mcg  50 mcg Oral DAILY  glucose chewable tablet 16 g  4 Tab Oral PRN  
 dextrose (D50W) injection syrg 12.5-25 g  25-50 mL IntraVENous PRN  
 glucagon (GLUCAGEN) injection 1 mg  1 mg IntraMUSCular PRN  
 insulin lispro (HUMALOG) injection   SubCUTAneous AC&HS  fludrocortisone (FLORINEF) tablet 0.2 mg  0.2 mg Oral DAILY  pantoprazole (PROTONIX) 40 mg in 0.9% sodium chloride 10 mL injection  40 mg IntraVENous Q12H  atorvastatin (LIPITOR) tablet 40 mg  40 mg Oral QHS Social History: 
Social History Tobacco Use  Smoking status: Never Smoker  Smokeless tobacco: Never Used Substance Use Topics  Alcohol use: No  
 
 
Family History: 
Family History Problem Relation Age of Onset  No Known Problems Mother  No Known Problems Father Review of Systems: 
Constitutional: negative fever, negative chills, negative weight loss Eyes:   negative visual changes ENT:   negative sore throat, tongue or lip swelling Respiratory:  negative cough, negative dyspnea Cards:  negative for chest pain, palpitations, lower extremity edema GI:   See HPI 
:  negative for frequency, dysuria Integument:  negative for rash and pruritus Heme:  negative for easy bruising and gum/nose bleeding Musculoskel: negative for myalgias,  back pain and muscle weakness Neuro: negative for headaches, dizziness, vertigo Psych:  negative for feelings of anxiety, depression Objective:  
 
Patient Vitals for the past 8 hrs: 
 BP Temp Pulse Resp SpO2 Height Weight  
09/22/20 1626 (!) 161/71 97.8 °F (36.6 °C) 80 16 100 %    
09/22/20 1147 (!) 160/86 98 °F (36.7 °C) 80 15 100 %    
09/22/20 0941 (!) 149/78     5' 5\" (1.651 m) 63 kg (139 lb)  
09/22/20 0900 (!) 171/92  75 14 100 %    
 
09/22 0701 - 09/22 1900 In: 360 [P.O.:360] Out: -  
09/20 1901 - 09/22 0700 In: 1091.7 [P.O.:300; I.V.:791.7] Out: 200 [Urine:200] EXAM:   
 NEURO-alert, oriented x3, affect appropriate HEENT-Head: Normocephalic, no lesions, without obvious abnormality. LUNGS-clear to auscultation bilaterally COR-regular rate and rhythym ABD- soft, non-tender. Bowel sounds normal. No masses,  no organomegaly EXT-no edema Skin - No rash Data Review Recent Labs  
  09/22/20 
0941 09/22/20 
0355 WBC 5.6 5.5 HGB 7.0* 6.9*  
HCT 25.5* 25.6*  
 320 Recent Labs  
  09/22/20 
0355 09/21/20 
2304 * 148* K 3.2* 3.2*  
* 113* CO2 27 30 BUN 14 14 CREA 1.50* 1.47* GLU 86 95 PHOS 2.8  --   
CA 7.8* 7.6* Recent Labs  
  09/22/20 
0355 09/21/20 
1438 AP 72 80  
TP 5.5* 6.0* ALB 2.4* 2.6*  
GLOB 3.1 3.4 Recent Labs  
  09/21/20 
1438 INR 1.1 PTP 11.6* Patient Active Problem List  
Diagnosis Code  History of transient ischemic attack (TIA) Z86.73  
 Dysphasia R47.02  
 Hypokalemia E87.6  Elevated serum creatinine R79.89  Stage 3 chronic kidney disease (HCC) N18.3  Right facial numbness R20.0  History of CVA (cerebrovascular accident) Z80.78  
 Acquired hypothyroidism E03.9  Anemia D64.9  Atopic dermatitis L20.9  Callus L84  Dementia ([facility] F03.90  Type II diabetes mellitus ([facility] E11.9  Diabetic neuropathy with neurologic complication ([facility]) D50.29, E11.49  
 Essential hypertension I10  
 History of breast cancer Z85.3  Hypercholesterolemia E78.00  Lymphedema I89.0  Memory loss R41.3  Osteopenia M85.80  Orthostatic hypotension I95.1  GI bleed K92.2  Symptomatic anemia D64.9  
 CHF (congestive heart failure) ([facility]) I50.9 Assessment and Plan: Anemia with Heme Positive Stools - Likely multifactorial but also concerning for AVM, Malignancy, Diverticular bleeding - Diet as tolerated today - Monitor hemoglobin and transfuse to goal of 7. 
- No anticoagulants 
- Continue IV PPI BID 
- Consider EGD and colonoscopy pending resolution of cardiac issues and discussion with palliative care regarding goals of care discussion. Following Thanks for allowing me to participate in the care of this patient.  
Signed By: Damon Lopez NP   
 9/22/2020  4:55 PM

## 2020-09-22 NOTE — PROGRESS NOTES
Bedside and Verbal shift change report given to Eugenia Sher (oncoming nurse) by Christopher Nolasco RN(offgoing nurse). Report included the following information SBAR, Kardex, Intake/Output, Accordion and Recent Results. SHIFT REPORT: 
 
5307: pt ready to leave. Pulled out IV. On side of bed attempting to get dressed. MD notified. Pt pulled out IV 
 
1400: MD at bedside 1615: daughter x2 at bedside. Explained events of the day and plan for tomorrow. Educated on visitor policy 1700: received call about CBC. No IV access at this time. Will obtain blood work and IV when pt allows 
 
1900: unable to obtain IV access and lab draw. MD notified. Night shift to attempt access and blood draw Bedside and Verbal shift change report given to Alex Marmolejo (oncoming nurse) by Drew Galdamez RN  (offgoing nurse). Report included the following information SBAR, Kardex, Intake/Output, Accordion and Recent Results.

## 2020-09-22 NOTE — PROGRESS NOTES
2:52 PM 
09/22/20 Transition of Care Plan:   
  Patient was going to sign out AMA called to Bronwyn Cunningham her Emergency Contact . Ms Brunilda Walker talked with the patient and convinced her to stay in the hospital . The patient did report that she is leaving in the morning . The patient does have  Her own  a RW, WC, toilet seat riser and a cane. She uses [facility] for her medications. 
  
1). Pateint admitted for medical management 2). Family will transport at  discharge 3). Resume [facility] 4). CM will follow for discharge needs 5). Patient will need to follow up with PCP and specialities as needed .  Lubna Barcenas RN CCM

## 2020-09-22 NOTE — CONSULTS
Seferino Almendarez MD [facility] [address] Office [phone] Date of  Admission: 9/21/2020  1:35 PM 
  
 
Assessment/Plan:  
· Apparent new onset paroxysmal a fib: if HR persists above 100 would start low dose BB. 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 Next steps in evaluation would include further lab testing: free light chains,  urine protein immunofixation and CMRI. Given her dementia and advanced age I would recommend palliative medicine address her multiple co morbidities first  as she also needs a GI work up, blood transfusion. · Acute Systolic CHF: would diuresis with low dose bumex IV given cough and effusions on chest xray, pBNP > 10,000. Start GDMT as hemodynamics allow. BB, ace-i or arb. · New LV dysfunction on ECHO new from 5/2019 ( EF 60% no WMA) · Anemia with heme + stools: GI to see. Doubt she will allow work up. · orthostatic hypotension: given LV dysfunction would consider stopping florinef and starting midodrine, Pt personally seen and examined. Chart reviewed. Agree with advanced NP's history, exam and  A/P with changes/additons. With advanced age/multiple co-morbidities/Dementia - palliative care consult to address goals of care. GDMT probably best avoiding invasive cardiac procedures Thank you for allowing us to participate in RAFA TUTTLE care. We will be happy to follow along. Please call for any questions. Consult requested by Kyleigh Rodriguez MD for *GI bleed [K92.2] Subjective: 
RAFA TUTTLE is a 80 y.o. AA  female  with PMH of HLD, CKD stage III, orhtostasis, TIA, dementia, breast ca s/p L mastectomy with LUE lymphedema who presented to the ED after referral from her PCP for blood in stool in setting of anemia. Hx obtained from chart as pt has underlying dementia and is currently refusing examination or ROS. Per chart family noticed a \"speck\" of blood in her stool 2 weeks ago and thinks she has seen it several times since. Describes stools as dark, denies bright red blood. Has chronic constipation and takes Colace and miralax daily. She denies h/o GI bleed but ED note mentions a prior transfusion. She does have iron deficieny anemia and reportedly takes iron supplements daily. Patient reported SOB and chest discomfort with exertion for several years that has gradually worsened recently (could not quantify timeframe) as well as nocturnal cough for 2 weeks. Of note, she was recently treated for PNA 9/7/20, L pleural effusion and PNA on CXR 8/31. She has dizziness related to orthostatic hypotension, improved with Florinef. Cardiology was consulted for a fib and ECHO findings concerning for amyloidosis. Unclear if she has seen a cardiologist previously and no family available. The patient refuses ROS. Cardiac risk factors   
Post menopausal female HLD 
 
 
LABS:   Troponin:  0.05      ProBNP:     > 10,000 XR Results (most recent): 
Results from [facility] Encounter encounter on 09/21/20 XR CHEST PORT Narrative EXAM: XR CHEST PORT INDICATION: Shortness of breath COMPARISON: 9/7/2020 FINDINGS: A portable AP radiograph of the chest was obtained at 1528 hours. The 
cardiomediastinal silhouette is normal. A left pleural effusion persist. There 
is mild central congestion. Bilateral shoulder osteoarthritis is present. Impression IMPRESSION: Central congestion with persistent left pleural effusion  
' 
Cardiographics:   Telemetry: PAF, now refusing. ECG: a fib Cardiac Testing/ Procedures: A.Cardiac Cath/PCI:  
B.ECHO/MICKEY:  
09/21/20 ECHO ADULT COMPLETE 09/22/2020 9/22/2020 Narrative · LV: Estimated LVEF is 35 - 40%. Visually measured ejection fraction. Normal cavity size and systolic function (ejection fraction normal). Upper  
normal wall thickness. Severe (grade 3) left ventricular diastolic  
dysfunction. · LA: Mildly dilated left atrium. · RA: Mildly dilated right atrium. · MV: Mitral valve thickening. Mitral valve leaflet calcification. Moderate mitral valve regurgitation is present. · TV: Moderate to severe tricuspid valve regurgitation is present. · PA: Pulmonary hypertension. · IVC: Severely elevated central venous pressure (15+ mmHg); IVC diameter  
is larger than 21 mm and collapses less than 50% with respiration. · Pericardium: Small pericardial effusion adjacent to right atrium. There  
is left pleural effusion. 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  
E. EP:  
Shivam Aleman SOCIAL HX: lives alone, has home health, niece lives across the street FAMILY HX:  
  
Patient Active Problem List  
 Diagnosis Date Noted  CHF (congestive heart failure) ([facility] [address] 09/22/2020  GI bleed 09/21/2020  Symptomatic anemia 09/21/2020  
 History of transient ischemic attack (TIA) 05/15/2019  Dysphasia 05/15/2019  Hypokalemia 05/15/2019  Elevated serum creatinine 05/15/2019  Stage 3 chronic kidney disease ([address] 05/15/2019  Right facial numbness 05/15/2019  
 History of CVA (cerebrovascular accident) 05/15/2019  Orthostatic hypotension 10/02/2018  Acquired hypothyroidism 09/28/2018  Anemia 09/28/2018  Atopic dermatitis 09/28/2018  Callus 09/28/2018  Dementia ([address] 09/28/2018  Type II diabetes mellitus ([address] 09/28/2018  Diabetic neuropathy with neurologic complication ([address] 28/16/5235  Essential hypertension 09/28/2018  History of breast cancer 09/28/2018  Hypercholesterolemia 09/28/2018  Lymphedema 09/28/2018  Memory loss 09/28/2018  Osteopenia 09/28/2018 Past Medical History:  
Diagnosis Date  Abnormal urinalysis 5/15/2019  Anemia  Breast cancer ([facility]  Cancer [facility])   
 left breast  
 Cataract  Chronic renal failure  Colonic polyp  Dementia ([facility] [address]  Diabetes ([facility]  Eczema  Headache  Hyperlipemia  Hypothyroidism  Ill-defined condition   
 high cholesterol, hypothyroid  Osteopenia  Posterior vitreous detachment, left  Type 2 diabetes mellitus ([facility] [address]  UTI (urinary tract infection)  Vitamin D deficiency Past Surgical History:  
Procedure Laterality Date  BREAST SURGERY PROCEDURE UNLISTED    
 left breast lumpectomy  HX APPENDECTOMY  HX CATARACT REMOVAL    
 HX COLONOSCOPY  2009  HX GYN    
 hysterectomy [id number] [facility] Left breast ,edial segmental mastectomy- 1992;  
 HX ORTHOPAEDIC    
 back No Known Allergies Current Facility-Administered Medications Medication Dose Route Frequency  0.9% sodium chloride infusion 250 mL  250 mL IntraVENous PRN  
 sodium chloride (NS) flush 5-40 mL  5-40 mL IntraVENous Q8H  
 sodium chloride (NS) flush 5-40 mL  5-40 mL IntraVENous PRN  polyethylene glycol (MIRALAX) packet 17 g  17 g Oral DAILY PRN  
 docusate sodium (COLACE) capsule 100 mg  100 mg Oral BID  ferrous sulfate tablet 325 mg  325 mg Oral BID  levothyroxine (SYNTHROID) tablet 50 mcg  50 mcg Oral DAILY  glucose chewable tablet 16 g  4 Tab Oral PRN  
 dextrose (D50W) injection syrg 12.5-25 g  25-50 mL IntraVENous PRN  
 glucagon (GLUCAGEN) injection 1 mg  1 mg IntraMUSCular PRN  
 insulin lispro (HUMALOG) injection   SubCUTAneous AC&HS  fludrocortisone (FLORINEF) tablet 0.2 mg  0.2 mg Oral DAILY  pantoprazole (PROTONIX) 40 mg in 0.9% sodium chloride 10 mL injection 40 mg IntraVENous Q12H  
 atorvastatin (LIPITOR) tablet 40 mg  40 mg Oral QHS Review of Symptoms: refuses Constitutional:  
ENT: negative Respiratory:  
Gastrointestinal:  
Genitourinary: Musculoskeletal: 
Neurological:  
 
 
Social History Socioeconomic History  Marital status:  Spouse name: Not on file  Number of children: Not on file  Years of education: Not on file  Highest education level: Not on file Tobacco Use  Smoking status: Never Smoker  Smokeless tobacco: Never Used Substance and Sexual Activity  Alcohol use: No  
 Drug use: No  
 Sexual activity: Not Currently Social History Narrative ** Merged History Encounter ** Family History Problem Relation Age of Onset  No Known Problems Mother  No Known Problems Father Physical Exam 
 
Visit Vitals BP (!) 160/86 Pulse 80 Temp 98 °F (36.7 °C) Resp 15 Ht 5' 5\" (1.651 m) Wt 63 kg (139 lb) LMP  (LMP Unknown) SpO2 100% BMI 23.13 kg/m² Exam limited at pt refuses to be examined. General: awake, alert, and oriented to self. MAEx4. No acute distress HEENT Exam:   
 Normocephalic, atraumatic. Sclera anicteric . Neck: supple Lung Exam: Not dyspneic at rest . Respirations unlabored. O2 @ 100 % room air  Sat: . Lungs:  Deferred 2/2 refusal   
 
Heart Exam: deferred   
  tele reviewed PAF, SR, occ PVC Abdomen Exam:   
  deferred : voiding Extremities Exam:   
  Atraumatic. 3 + LE edema. Vascular Exam:   
  deferred Neuro exam:  No focal deficits Psy Exam: agitated and confused. Recent Results (from the past 12 hour(s)) METABOLIC PANEL, COMPREHENSIVE Collection Time: 09/22/20  3:55 AM  
Result Value Ref Range Sodium 147 (H) 136 - 145 mmol/L Potassium 3.2 (L) 3.5 - 5.1 mmol/L Chloride 113 (H) 97 - 108 mmol/L  
 CO2 27 21 - 32 mmol/L Anion gap 7 5 - 15 mmol/L Glucose 86 65 - 100 mg/dL BUN 14 6 - 20 MG/DL Creatinine 1.50 (H) 0.55 - 1.02 MG/DL  
 BUN/Creatinine ratio 9 (L) 12 - 20 GFR est AA 39 (L) >60 ml/min/1.73m2 GFR est non-AA 32 (L) >60 ml/min/1.73m2 Calcium 7.8 (L) 8.5 - 10.1 MG/DL Bilirubin, total 0.3 0.2 - 1.0 MG/DL  
 ALT (SGPT) 8 (L) 12 - 78 U/L  
 AST (SGOT) 12 (L) 15 - 37 U/L Alk. phosphatase 72 45 - 117 U/L Protein, total 5.5 (L) 6.4 - 8.2 g/dL Albumin 2.4 (L) 3.5 - 5.0 g/dL Globulin 3.1 2.0 - 4.0 g/dL A-G Ratio 0.8 (L) 1.1 - 2.2    
CBC WITH AUTOMATED DIFF Collection Time: 09/22/20  3:55 AM  
Result Value Ref Range WBC 5.5 3.6 - 11.0 K/uL  
 RBC 3.69 (L) 3.80 - 5.20 M/uL HGB 6.9 (L) 11.5 - 16.0 g/dL HCT 25.6 (L) 35.0 - 47.0 % MCV 69.4 (L) 80.0 - 99.0 FL  
 MCH 18.7 (L) 26.0 - 34.0 PG  
 MCHC 27.0 (L) 30.0 - 36.5 g/dL RDW 26.7 (H) 11.5 - 14.5 % PLATELET 981 945 - 635 K/uL NRBC 0.0 0  WBC ABSOLUTE NRBC 0.00 0.00 - 0.01 K/uL NEUTROPHILS 59 32 - 75 % LYMPHOCYTES 26 12 - 49 % MONOCYTES 12 5 - 13 % EOSINOPHILS 2 0 - 7 % BASOPHILS 1 0 - 1 % IMMATURE GRANULOCYTES 0 0.0 - 0.5 % ABS. NEUTROPHILS 3.2 1.8 - 8.0 K/UL  
 ABS. LYMPHOCYTES 1.4 0.8 - 3.5 K/UL  
 ABS. MONOCYTES 0.7 0.0 - 1.0 K/UL  
 ABS. EOSINOPHILS 0.1 0.0 - 0.4 K/UL  
 ABS. BASOPHILS 0.1 0.0 - 0.1 K/UL  
 ABS. IMM. GRANS. 0.0 0.00 - 0.04 K/UL  
 DF SMEAR SCANNED    
 RBC COMMENTS ANISOCYTOSIS 2+ 
    
 RBC COMMENTS HYPOCHROMIA 2+ 
    
 RBC COMMENTS MICROCYTOSIS 2+ 
    
 RBC COMMENTS SCHISTOCYTES 1+ MAGNESIUM Collection Time: 09/22/20  3:55 AM  
Result Value Ref Range Magnesium 1.8 1.6 - 2.4 mg/dL PHOSPHORUS Collection Time: 09/22/20  3:55 AM  
Result Value Ref Range Phosphorus 2.8 2.6 - 4.7 MG/DL  
NT-PRO BNP Collection Time: 09/22/20  3:55 AM  
Result Value Ref Range NT pro-BNP 10,353 (H) <450 PG/ML  
GLUCOSE, POC Collection Time: 09/22/20  8:15 AM  
Result Value Ref Range Glucose (POC) 86 65 - 100 mg/dL Performed by Pawan Blackman (PCT) RBC, ALLOCATE Collection Time: 09/22/20  9:15 AM  
Result Value Ref Range HISTORY CHECKED? Historical check performed CBC W/O DIFF Collection Time: 09/22/20  9:41 AM  
Result Value Ref Range WBC 5.6 3.6 - 11.0 K/uL  
 RBC 3.65 (L) 3.80 - 5.20 M/uL HGB 7.0 (L) 11.5 - 16.0 g/dL HCT 25.5 (L) 35.0 - 47.0 % MCV 69.9 (L) 80.0 - 99.0 FL  
 MCH 19.2 (L) 26.0 - 34.0 PG  
 MCHC 27.5 (L) 30.0 - 36.5 g/dL RDW 26.7 (H) 11.5 - 14.5 % PLATELET 951 131 - 945 K/uL MPV 9.5 8.9 - 12.9 FL  
 NRBC 0.0 0  WBC ABSOLUTE NRBC 0.00 0.00 - 0.01 K/uL RETICULOCYTE COUNT Collection Time: 09/22/20  9:41 AM  
Result Value Ref Range Reticulocyte count 1.3 0.7 - 2.1 % Absolute Retic Cnt. 0.0485 0.0164 - 0.0776 M/ul ECHO ADULT COMPLETE Collection Time: 09/22/20  9:41 AM  
Result Value Ref Range IVSd 0.82 0.6 - 0.9 cm LVIDd 4.90 3.9 - 5.3 cm LVIDs 4.08 cm  
 LVOT d 1.75 cm  
 LVPWd 1.11 (A) 0.6 - 0.9 cm  
 LVOT Peak Gradient 1.31 mmHg LVOT Peak Velocity 57.15 cm/s RVIDd 3.17 cm RVSP 65.56 mmHg RVSP 53.80 mmHg RVSP 50.92 mmHg RVSP 65.56 mmHg RVSP 58.49 mmHg RVSP 45.40 mmHg RVSP 44.75 mmHg RVSP 51.07 mmHg RVSP 48.06 mmHg RVSP 56.76 mmHg RVSP 51.39 mmHg RVSP 53.76 mmHg Left Atrium Major Axis 3.63 cm  
 LA Volume 95.06 22 - 52 mL  
 LA Area 4C 23.15 cm2 LA Vol 2C 102.58 (A) 22 - 52 mL  
 LA Vol 4C 75.51 (A) 22 - 52 mL  
 LA Volume DISK BP 89.21 22 - 52 mL Est. RA Pressure 15.00 mmHg Aortic Valve Area by Continuity of Peak Velocity 0.90 cm2 AoV PG 9.33 mmHg Aortic Valve Systolic Peak Velocity 862.11 cm/s  
 MV A Alberto 85.28 centimeter/second Mitral Valve E Wave Deceleration Time 0.17 s MV E Alberto 107.64 centimeter/second LV E' Lateral Velocity 6.29 centimeter/second LV E' Septal Velocity 4.79 centimeter/second Pulmonic Valve Systolic Peak Instantaneous Gradient 2.57 mmHg Pulmonic Regurgitant End Max Velocity 80.02 cm/s Tapse 1.50 1.5 - 2.0 cm Triscuspid Valve Regurgitation Peak Gradient 50.56 mmHg Triscuspid Valve Regurgitation Peak Gradient 38.80 mmHg Triscuspid Valve Regurgitation Peak Gradient 35.92 mmHg Triscuspid Valve Regurgitation Peak Gradient 50.56 mmHg Triscuspid Valve Regurgitation Peak Gradient 43.49 mmHg Triscuspid Valve Regurgitation Peak Gradient 30.40 mmHg Triscuspid Valve Regurgitation Peak Gradient 29.75 mmHg Triscuspid Valve Regurgitation Peak Gradient 36.07 mmHg Triscuspid Valve Regurgitation Peak Gradient 33.06 mmHg Triscuspid Valve Regurgitation Peak Gradient 41.76 mmHg Triscuspid Valve Regurgitation Peak Gradient 36.39 mmHg Triscuspid Valve Regurgitation Peak Gradient 38.76 mmHg  
 TR Max Velocity 355.53 cm/s Ao Root D 2.78 cm  
 LV Mass .8 67 - 162 g  
 LV Mass AL Index 99.0 43 - 95 g/m2 Left Atrium Minor Axis 2.14 cm  
 LA Vol Index 56.09 16 - 28 ml/m2 LA Vol Index 60.53 16 - 28 ml/m2 LA Vol Index 44.56 16 - 28 ml/m2 EYAL/BSA Pk Alberto 0.5 cm2/m2 GLUCOSE, POC Collection Time: 09/22/20 11:15 AM  
Result Value Ref Range Glucose (POC) 91 65 - 100 mg/dL Performed by Mica Chilel (PCT) Santino Oakes [facility]

## 2020-09-23 VITALS
RESPIRATION RATE: 16 BRPM | HEART RATE: 85 BPM | TEMPERATURE: 97.5 F | WEIGHT: 142.1 LBS | SYSTOLIC BLOOD PRESSURE: 119 MMHG | DIASTOLIC BLOOD PRESSURE: 56 MMHG | HEIGHT: 65 IN | BODY MASS INDEX: 23.68 KG/M2 | OXYGEN SATURATION: 98 %

## 2020-09-23 PROBLEM — I48.0 PAROXYSMAL ATRIAL FIBRILLATION (HCC): Status: ACTIVE | Noted: 2020-09-23

## 2020-09-23 PROBLEM — I48.91 ATRIAL FIBRILLATION (HCC): Status: ACTIVE | Noted: 2020-09-23

## 2020-09-23 LAB
GLUCOSE BLD STRIP.AUTO-MCNC: 188 MG/DL (ref 65–100)
GLUCOSE BLD STRIP.AUTO-MCNC: 82 MG/DL (ref 65–100)
SERVICE CMNT-IMP: ABNORMAL
SERVICE CMNT-IMP: NORMAL

## 2020-09-23 PROCEDURE — 99238 HOSP IP/OBS DSCHRG MGMT 30/<: CPT | Performed by: FAMILY MEDICINE

## 2020-09-23 PROCEDURE — 82962 GLUCOSE BLOOD TEST: CPT

## 2020-09-23 PROCEDURE — 74011000250 HC RX REV CODE- 250: Performed by: STUDENT IN AN ORGANIZED HEALTH CARE EDUCATION/TRAINING PROGRAM

## 2020-09-23 PROCEDURE — 97165 OT EVAL LOW COMPLEX 30 MIN: CPT

## 2020-09-23 PROCEDURE — 74011250637 HC RX REV CODE- 250/637: Performed by: STUDENT IN AN ORGANIZED HEALTH CARE EDUCATION/TRAINING PROGRAM

## 2020-09-23 PROCEDURE — 99223 1ST HOSP IP/OBS HIGH 75: CPT | Performed by: NURSE PRACTITIONER

## 2020-09-23 PROCEDURE — 97530 THERAPEUTIC ACTIVITIES: CPT

## 2020-09-23 PROCEDURE — 97162 PT EVAL MOD COMPLEX 30 MIN: CPT

## 2020-09-23 PROCEDURE — 3331090002 HH PPS REVENUE DEBIT

## 2020-09-23 PROCEDURE — C9113 INJ PANTOPRAZOLE SODIUM, VIA: HCPCS | Performed by: STUDENT IN AN ORGANIZED HEALTH CARE EDUCATION/TRAINING PROGRAM

## 2020-09-23 PROCEDURE — 74011636637 HC RX REV CODE- 636/637: Performed by: STUDENT IN AN ORGANIZED HEALTH CARE EDUCATION/TRAINING PROGRAM

## 2020-09-23 PROCEDURE — 3331090001 HH PPS REVENUE CREDIT

## 2020-09-23 PROCEDURE — 97535 SELF CARE MNGMENT TRAINING: CPT

## 2020-09-23 PROCEDURE — 74011250636 HC RX REV CODE- 250/636: Performed by: STUDENT IN AN ORGANIZED HEALTH CARE EDUCATION/TRAINING PROGRAM

## 2020-09-23 PROCEDURE — 99231 SBSQ HOSP IP/OBS SF/LOW 25: CPT | Performed by: INTERNAL MEDICINE

## 2020-09-23 PROCEDURE — 97116 GAIT TRAINING THERAPY: CPT

## 2020-09-23 PROCEDURE — 74011250637 HC RX REV CODE- 250/637: Performed by: NURSE PRACTITIONER

## 2020-09-23 RX ORDER — MIDODRINE HYDROCHLORIDE 2.5 MG/1
2.5 TABLET ORAL 2 TIMES DAILY WITH MEALS
Qty: 60 TAB | Refills: 0 | Status: SHIPPED | OUTPATIENT
Start: 2020-09-23 | End: 2020-10-15 | Stop reason: SDUPTHER

## 2020-09-23 RX ORDER — POTASSIUM CHLORIDE 750 MG/1
20 TABLET, FILM COATED, EXTENDED RELEASE ORAL
Status: CANCELLED | OUTPATIENT
Start: 2020-09-23 | End: 2020-09-23

## 2020-09-23 RX ORDER — PHENOL/SODIUM PHENOLATE
20 AEROSOL, SPRAY (ML) MUCOUS MEMBRANE DAILY
Qty: 30 TAB | Refills: 0 | Status: SHIPPED | OUTPATIENT
Start: 2020-09-23 | End: 2020-10-15 | Stop reason: SDUPTHER

## 2020-09-23 RX ORDER — BUMETANIDE 0.25 MG/ML
1 INJECTION INTRAMUSCULAR; INTRAVENOUS ONCE
Status: COMPLETED | OUTPATIENT
Start: 2020-09-23 | End: 2020-09-23

## 2020-09-23 RX ORDER — BUMETANIDE 1 MG/1
1 TABLET ORAL AS NEEDED
Qty: 30 TAB | Refills: 0 | Status: SHIPPED | OUTPATIENT
Start: 2020-09-23 | End: 2020-10-15 | Stop reason: SDUPTHER

## 2020-09-23 RX ORDER — MIDODRINE HYDROCHLORIDE 5 MG/1
2.5 TABLET ORAL 2 TIMES DAILY WITH MEALS
Status: DISCONTINUED | OUTPATIENT
Start: 2020-09-23 | End: 2020-09-23 | Stop reason: HOSPADM

## 2020-09-23 RX ADMIN — FERROUS SULFATE TAB 325 MG (65 MG ELEMENTAL FE) 325 MG: 325 (65 FE) TAB at 08:59

## 2020-09-23 RX ADMIN — PANTOPRAZOLE SODIUM 40 MG: 40 INJECTION, POWDER, FOR SOLUTION INTRAVENOUS at 08:57

## 2020-09-23 RX ADMIN — LEVOTHYROXINE SODIUM 50 MCG: 0.05 TABLET ORAL at 08:59

## 2020-09-23 RX ADMIN — BUMETANIDE 1 MG: 0.25 INJECTION, SOLUTION INTRAMUSCULAR; INTRAVENOUS at 00:00

## 2020-09-23 RX ADMIN — MIDODRINE HYDROCHLORIDE 2.5 MG: 5 TABLET ORAL at 12:05

## 2020-09-23 RX ADMIN — INSULIN LISPRO 2 UNITS: 100 INJECTION, SOLUTION INTRAVENOUS; SUBCUTANEOUS at 12:09

## 2020-09-23 NOTE — PROGRESS NOTES
Discharge instructions and prescriptions reviewed with niece and patient. Opportunity provided for questions. Patient and niece verbalized understanding. Signed copy of discharge placed in the front of patient's chart. IV and tele removed.

## 2020-09-23 NOTE — NURSE NAVIGATOR
Chart reviewed by Heart Failure Nurse Navigator. Heart Failure database completed. Patient admitted with GI bleed with anemia. Patient also with new onset atrial fib with  Acute HFrEF and findings consistent with cardiac amyloid by Echo. Patient with advanced age and multiple co morbidities. Palliative care evaluation is pending to assist with GOC. 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 ACEi/ARB/ARNi: Unable to tolerate due to orthostatic hypotension. BB: Unable to tolerate due to orthostatic hypotension. Aldosterone Antagonist: ** 
 
CRT Not indicated. NYHA Functional Class **. Heart Failure Teach Back in Patient Education. Heart Failure Avoiding Triggers on Discharge Instructions. Cardiologist: Dr. July Bar

## 2020-09-23 NOTE — DISCHARGE INSTRUCTIONS
HOME DISCHARGE INSTRUCTIONS    Chelsea Wellington / [id number] : 1925    Admission date: 2020 Discharge date: 2020     Please bring this form with you to show your care provider at your follow-up appointment. Primary care provider:  Hema Bravo MD    Discharging provider:  Fabby Rogers MD  - Family Medicine Resident  Dr. 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 . . . . . . . . . . . . . . . . . . . . . . . . . . . . . . . . . . . . . . . . . . . . . . . . . . . . . . . . . . . . . . . . . . . . . . Viky Benson FOLLOW-UP CARE RECOMMENDATIONS:  You are well enough to be discharged from the hospital. However, because you were staying in a hospital, you are at greater risk of having been exposed to the coronavirus. PLEASE stay inside and quarantine for 14 days to prevent further spread of the coronavirus.     Appointments  Follow-up Information     Follow up With Specialties Details Why Contact Info    Hema Bravo MD Family Medicine Go on 2020 For follow up of visit at 8:20am [address] [facility]  [phone]      Harmony Cotton MD Cardiology Schedule an appointment as soon as possible for a visit  [facility]   [address] [facility]      Carlo Henning MD Gastroenterology Schedule an appointment as soon as possible for a visit  [address] [facility]  [phone]      [address]   [address] [address] [id number] [phone]    Darby Calero MD [facility], Palliative Medicine Schedule an appointment as soon as possible for a visit  [address] [place] [id number] [phone]             Please follow up with your PCP regarding:  - GI bleed  - Anemia  - 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 Recommended further investigative testing includes colonoscopy, EGD, cardiac MRI,  free light chains, urine protein immunofixation. Pending test results: At the time of your discharge the following test results are still pending: None. Please make sure you review these results with your outpatient follow-up provider(s). Specific symptoms to watch for: chest pain, shortness of breath, fever, chills, nausea, vomiting, diarrhea, change in mentation, falling, weakness, bleeding. DIET/what to eat:  Cardiac Diet    ACTIVITY:  Activity as tolerated with assistance    Wound care: N/A    Equipment needed:  None    What to do if new or unexpected symptoms occur? If you experience any of the above symptoms (or should other concerns or questions arise after discharge) please call your primary care physician. Return to the emergency room if you cannot get hold of your doctor. · It is very important that you keep your follow-up appointment(s). · Please bring discharge papers, medication list (and/or medication bottles) to your follow-up appointments for review by your outpatient provider(s). · Please check the list of medications and be sure it includes every medication (even non-prescription medications) that your provider wants you to take. · It is important that you take the medication exactly as they are prescribed. · Keep your medication in the bottles provided by the pharmacist and keep a list of the medication names, dosages, and times to be taken in your wallet. · Do not take other medications without consulting your doctor.    · If you have any questions about your medications or other instructions, please talk to your nurse or care provider before you leave the hospital.     Information obtained by:     I understand that if any problems occur once I am at home I am to contact my physician. These instructions were explained to me and I had the opportunity to ask questions. I understand and acknowledge receipt of the instructions indicated above. Physician's or R.N.'s Signature                                                                  Date/Time                                                                                                                                              Patient or Representative Signature                                                          Date/Time    Patient Education        Anemia: Care Instructions  Your Care Instructions     Anemia is a low level of red blood cells, which carry oxygen throughout your body. Many things can cause anemia. Lack of iron is one of the most common causes. Your body needs iron to make hemoglobin, a substance in red blood cells that carries oxygen from the lungs to your body's cells. Without enough iron, the body produces fewer and smaller red blood cells. As a result, your body's cells do not get enough oxygen, and you feel tired and weak. And you may have trouble concentrating. Bleeding is the most common cause of a lack of iron. You may have heavy menstrual bleeding or bleeding caused by conditions such as ulcers, hemorrhoids, or cancer. Regular use of aspirin or other anti-inflammatory medicines (such as ibuprofen) also can cause bleeding in some people. A lack of iron in your diet also can cause anemia, especially at times when the body needs more iron, such as during pregnancy, infancy, and the teen years. Your doctor may have prescribed iron pills.  It may take several months of treatment for your iron levels to return to normal. Your doctor also may suggest that you eat foods that are rich in iron, such as meat and beans. There are many other causes of anemia. It is not always due to a lack of iron. Finding the specific cause of your anemia will help your doctor find the right treatment for you. Follow-up care is a key part of your treatment and safety. Be sure to make and go to all appointments, and call your doctor if you are having problems. It's also a good idea to know your test results and keep a list of the medicines you take. How can you care for yourself at home? · Take your medicines exactly as prescribed. Call your doctor if you think you are having a problem with your medicine. · If your doctor recommends iron pills, take them as directed:  ? Try to take the pills on an empty stomach about 1 hour before or 2 hours after meals. But you may need to take iron with food to avoid an upset stomach. ? Do not take antacids or drink milk or caffeine drinks (such as coffee, tea, or cola) at the same time or within 2 hours of the time that you take your iron. They can make it hard for your body to absorb the iron. ? Vitamin C (from food or supplements) helps your body absorb iron. Try taking iron pills with a glass of orange juice or some other food that is high in vitamin C, such as citrus fruits. ? Iron pills may cause stomach problems, such as heartburn, nausea, diarrhea, constipation, and cramps. Be sure to drink plenty of fluids, and include fruits, vegetables, and fiber in your diet each day. Iron pills often make your bowel movements dark or green. ? If you forget to take an iron pill, do not take a double dose of iron the next time you take a pill. ? Keep iron pills out of the reach of small children. An overdose of iron can be very dangerous. · Follow your doctor's advice about eating iron-rich foods. These include red meat, shellfish, poultry, eggs, beans, raisins, whole-grain bread, and leafy green vegetables. · Steam vegetables to help them keep their iron content.   When should you call for help?   Call 911 anytime you think you may need emergency care. For example, call if:    · You have symptoms of a heart attack. These may include:  ? Chest pain or pressure, or a strange feeling in the chest.  ? Sweating. ? Shortness of breath. ? Nausea or vomiting. ? Pain, pressure, or a strange feeling in the back, neck, jaw, or upper belly or in one or both shoulders or arms. ? Lightheadedness or sudden weakness. ? A fast or irregular heartbeat. After you call 911, the  may tell you to chew 1 adult-strength or 2 to 4 low-dose aspirin. Wait for an ambulance. Do not try to drive yourself.     · You passed out (lost consciousness). Call your doctor now or seek immediate medical care if:    · You have new or increased shortness of breath.     · You are dizzy or lightheaded, or you feel like you may faint.     · Your fatigue and weakness continue or get worse.     · You have any abnormal bleeding, such as:  ? Nosebleeds. ? Vaginal bleeding that is different (heavier, more frequent, at a different time of the month) than what you are used to.  ? Bloody or black stools, or rectal bleeding. ? Bloody or pink urine. Watch closely for changes in your health, and be sure to contact your doctor if:    · You do not get better as expected. Where can you learn more? Go to http://nelly-mellissa.info/  Enter R301 in the search box to learn more about \"Anemia: Care Instructions. \"  Current as of: November 8, 2019               Content Version: 12.6  © 3350-0967 [employer]. Care instructions adapted under license by [employer] (which disclaims liability or warranty for this information). If you have questions about a medical condition or this instruction, always ask your healthcare professional. [address] any warranty or liability for your use of this information.        Patient Education        Gastrointestinal Bleeding: Care Instructions  Your Care Instructions     The digestive or gastrointestinal tract goes from the mouth to the anus. It is often called the GI tract. Bleeding can happen anywhere in the GI tract. It may be caused by an ulcer, an infection, or cancer. It may also be caused by medicines such as aspirin or ibuprofen. Light bleeding may not cause any symptoms at first. But if you continue to bleed for a while, you may feel very weak or tired. Sudden, heavy bleeding means you need to see a doctor right away. This kind of bleeding can be very dangerous. But it can usually be cured or controlled. The doctor may do some tests to find the cause of your bleeding. Follow-up care is a key part of your treatment and safety. Be sure to make and go to all appointments, and call your doctor if you are having problems. It's also a good idea to know your test results and keep a list of the medicines you take. How can you care for yourself at home? · Be safe with medicines. Take your medicines exactly as prescribed. Call your doctor if you think you are having a problem with your medicine. You will get more details on the specific medicines your doctor prescribes. · Do not take aspirin or other anti-inflammatory medicines, such as naproxen (Aleve) or ibuprofen (Advil, Motrin), without talking to your doctor first. Ask your doctor if it is okay to use acetaminophen (Tylenol). · Do not drink alcohol. · The bleeding may make you lose iron. So it's important to eat foods that have a lot of iron. These include red meat, shellfish, poultry, and eggs. They also include beans, raisins, whole-grain breads, and leafy green vegetables. If you want help planning meals, you can make an appointment with a dietitian. When should you call for help? Call 911 anytime you think you may need emergency care.  For example, call if:    · You have sudden, severe belly pain.     · You vomit blood or what looks like coffee grounds.     · You passed out (lost consciousness).     · Your stools are maroon or very bloody. Call your doctor now or seek immediate medical care if:    · You are dizzy or lightheaded, or you feel like you may faint.     · Your stools are black and look like tar, or they have streaks of blood.     · You have belly pain.     · You vomit or have nausea.     · You have trouble swallowing, or it hurts when you swallow. Watch closely for changes in your health, and be sure to contact your doctor if:    · You do not get better as expected. Where can you learn more? Go to http://www.gray.com/  Enter F981 in the search box to learn more about \"Gastrointestinal Bleeding: Care Instructions. \"  Current as of: June 26, 2019               Content Version: 12.6  © 2809-4314 [employer]. Care instructions adapted under license by [employer] (which disclaims liability or warranty for this information). If you have questions about a medical condition or this instruction, always ask your healthcare professional. [address] any warranty or liability for your use of this information.

## 2020-09-23 NOTE — PROGRESS NOTES
Problem: Falls - Risk of 
Goal: *Absence of Falls Description: Document Mercedes Fry Fall Risk and appropriate interventions in the flowsheet. Outcome: Progressing Towards Goal 
Note: Fall Risk Interventions: 
Mobility Interventions: Bed/chair exit alarm, Patient to call before getting OOB Mentation Interventions: Adequate sleep, hydration, pain control, Bed/chair exit alarm, Door open when patient unattended Medication Interventions: Bed/chair exit alarm, Patient to call before getting OOB, Teach patient to arise slowly Elimination Interventions: Bed/chair exit alarm, Call light in reach, Patient to call for help with toileting needs

## 2020-09-23 NOTE — DISCHARGE SUMMARY
Clay Paynenatalie Sampson Santiago Blair Dowling  Office [phone] Fax [phone] Discharge / Transfer / Off-Service Note Name: Dianne Paniagua MRN: [id number]  Sex: Female YOB: 1925  Age: 80 y.o. PCP: Romulo Toledo MD  
 
Date of admission: 9/21/2020 Date of discharge/transfer: 9/23/2020 Attending physician at admission: Van Bowling MD  
 
Attending physician at discharge/transfer: Dr. Luciano Knapp Resident physician at discharge/transfer: Vesta Harris DO Consultants during hospitalization IP CONSULT TO GASTROENTEROLOGY 
IP CONSULT TO PALLIATIVE CARE - PROVIDER 
IP CONSULT TO CARDIOLOGY Admission diagnoses GI bleed [K92.2] Recommended follow-up after discharge 1. PCP: Romulo Toledo MD 
 
Things to follow up on with PCP: 
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  
 
Dianne Paniagua is a 80 y.o. female with a PMH of Iron deficiency anemia, hypothyroidism, T2D, hyperlipidemia, CKD-3, orthostatic hypotension, osteopenia, h/o breast cancer s/p L mastectomy, h/o TIA, hypokalemia, dementia, chronic left arm lymphedema who presents to the ED after referral from her PCP for blood in stool in setting of anemia. Patient is a poor historian. First noticed a \"speck\" of blood in her stool 2 weeks ago and thinks she has seen it several times since. Describes stools as dark, denies bright red blood. Has chronic constipation and takes Colace and miralax daily, denies N/V/D. She denies h/o GI bleed but ED note mentions a prior transfusion. She does have iron deficieny anemia and reportedly takes iron supplements daily.   Patient reports SOB and chest discomfort with exertion for several years that has gradually worsened recently (could not quantify timeframe) as well as nocturnal cough for 2 weeks.  Of note, she was recently treated for PNA 9/7/20, L pleural effusion and PNA on CXR 8/31. She has dizziness related to orthostatic hypotension, improved with Florinef.   
  
Attempted to clarify history with Niece Payam Ulloa) but could not reach her.  
Minerva Mann a 80 y. o. female with a PMH of Iron deficiency anemia, hypothyroidism, T2D, hyperlipidemia, CKD-3, orthostatic hypotension, osteopenia, h/o breast cancer s/p L mastectomy, h/o TIA, hypokalemia, dementia, chronic left arm lymphedema who presents to the ED after referral from her PCP for blood in stool in setting of anemia.   
  
Patient would like to leave without further intervention. I spoke to her niece (stephanie) this morning about her wishes in regards to further testing given suspected amyloidosis per below. Niece understands this disease if identified would not be curable and requests symptomatic management only. The niece confirms that she does live walking distance from the patient and that [facility] has already been arranged and comes 3d/wk for 4 hours. Patient does not want additional assistance at discharge. 
  
GI Bleeding: Stable. POA Hgb 7.2 (BL 8.5). FOBT +. DDx AVM, malignancy, diverticular bleed, amyloidosis, other. Reports no h/o abnormal colonoscopy, most recent over 10yrs ago. CT abd 9/17 shows colonic diverticulosis, no acute process. Reticulocyte count normal. GI consulted, appreciate rec - Omeprazole 20mg daily 
- Hold plavix, defer to PCP to restart if Hgb stable - Consider EGD and colonoscopy pending palliative discussion of care goals given cardiac findings below - Follow up with PCP for CBC 
  
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 Multiple sx (new onset afib, GI bleed, orthostasis, proteinuria, LV dysfunction). Cardiology followed during stay.  
- Next steps include free light chains, urine protein immunofixation, cardiac MRI if patient wishes to pursue outpatient. Does not care to at this point. 
- Palliative c/s - Recommend cardiology f/u 
  
Acute Systolic CHF: Echo 7/55 LVEF 35-40%, wall thickness, G3DD. BNP > 10,000. Echo 5/2019 LVEF 60%, NRWMA. - Bumex 1mg po prn for swelling or SOB 
- F/u cardiology Orthopnea: 2/2 to new onset HF. Echo 9/22 LVEF 35-40%. CXR 9/21 L pleural effusion still present but improved from 8/31.  
- Diurese per plan above New-onset Paroxysmal Afib: Noted on tele w/ spontaneous conversion. EKG SR with PVCs. [id number] 15.2% stroke/TIA/embolism, HAS-BLED >10% risk. 
- No anticoagulation at this time considering GI bleed - F/u with cardiology and PCP  
  
Orthostatic Hypotension: Associated with frequent falls. Hoem Florinef 0.2mg po daily. Per cardiology, Florinef contraindicated with LV dysfunction. 
- Taper Florinef to 0.1mg for 9/23-9/30 before discontinuing on 10/1 
- Start Midodrine 2.5mg po TID  
- Recommended that PCP check BP at f/u to assess for HTN on midodrine 
  
Iron Deficiency Anemia: Most recent iron studies 9/17/2020. Per chart review non compliant in past and just restarted taking it. POA 7.2, BL Hgb ~9.   
- Continue Iron 325mg po BID  
- Monitor at PCP f/u with CBC 
  
Hypokalemia: Improved. POA 2.8 
- Consider oral potassium if remains low on added Bumex - Recheck at PCP f/u 
  
Chronic Constipation: Likely 2/2 to iron supplements. - Home Miralax daily and Colace BID 
  
CKD-3: POA Cr 1.64, BL Cr 1.6. Possibly 2/2 to suspected amyloidosis. Not followed by nephrology.   
- Monitor outpatient with added Bumex 
  
Type 2 Diabetes: POA glucose 219.  Diet controlled.  A1c 5.2% in 5/2019 
- A1c unable to calculate due to anemia 
- Diabetic diet 
  
HTN: .75. Amlodipine d/c due to orthostatic hypotension.  No home meds currently.  
- Monitor outpatient with added Bumex and Midodrine 
  
Hypothyroidism: TSH 2.45 
- Home synthroid 50mcg po daily 
  
 H/o TIA: 05/2019.  Does not take asprin at home, only plavix 
- Hold plavix 75mg po daily - Defer to PCP to restart with stable Hgb 
  
Hyperlipidemia 
- Home atorvastatin 40mg qhs 
  
 
Physical exam at discharge: 
 
Vitals Reviewed. Visit Vitals BP (!) 148/57 (BP 1 Location: Right arm, BP Patient Position: Sitting;Post activity) Pulse 84 Temp 98.2 °F (36.8 °C) Resp 15 Ht 5' 5\" (1.651 m) Wt 142 lb 1.6 oz (64.5 kg) SpO2 99% BMI 23.65 kg/m² Condition at discharge: Stable Labs Recent Labs  
  09/22/20 
2229 09/22/20 
0941 09/22/20 
0355 WBC 6.4 5.6 5.5 HGB 7.4* 7.0* 6.9*  
HCT 26.9* 25.5* 25.6*  
 308 320 Recent Labs  
  09/22/20 
0355 09/21/20 
2304 09/21/20 
1438 * 148* 146*  
K 3.2* 3.2* 2.8*  
* 113* 110* CO2 27 30 28 BUN 14 14 12 CREA 1.50* 1.47* 1.64* GLU 86 95 219* CA 7.8* 7.6* 7.8*  
MG 1.8  --  2.1 PHOS 2.8  --   --   
 
Recent Labs  
  09/22/20 
0355 09/21/20 
1438 ALT 8* 8* AP 72 80 TBILI 0.3 0.3 TP 5.5* 6.0* ALB 2.4* 2.6*  
GLOB 3.1 3.4 Recent Labs  
  09/23/20 
0808 09/22/20 
2134 09/22/20 
1613 09/22/20 
1115 09/22/20 
0815  09/21/20 
1438 TROIQ  --   --   --   --   --   --  <0.05 INR  --   --   --   --   --   --  1.1 PTP  --   --   --   --   --   --  11.6*  
GLUCPOC 82 132* 154* 91 86   < >  --   
 < > = values in this interval not displayed. Procedures / Diagnostic Studies Echo 9/22: 
Result status: Final result · LV: Estimated LVEF is 35 - 40%. Visually measured ejection fraction. Normal cavity size and systolic function (ejection fraction normal). Upper normal wall thickness. Severe (grade 3) left ventricular diastolic dysfunction. · LA: Mildly dilated left atrium. · RA: Mildly dilated right atrium. · MV: Mitral valve thickening. Mitral valve leaflet calcification. Moderate mitral valve regurgitation is present. · TV: Moderate to severe tricuspid valve regurgitation is present. · PA: Pulmonary hypertension. · IVC: Severely elevated central venous pressure (15+ mmHg); IVC diameter is larger than 21 mm and collapses less than 50% with respiration. · Pericardium: Small pericardial effusion adjacent to right atrium. There is left pleural effusion. · Findings concerning for cardiac amyloidosis Imaging Xr Chest [facility] Result Date: 9/21/2020 IMPRESSION: Central congestion with persistent left pleural effusion Chronic diagnoses Problem List as of 9/23/2020 Date Reviewed: 8/31/2020 Codes Class Noted - Resolved Paroxysmal atrial fibrillation (HCC) ICD-10-CM: I48.0 ICD-9-CM: 427.31  9/23/2020 - Present CHF (congestive heart failure) (HCC) ICD-10-CM: I50.9 ICD-9-CM: 428.0  9/22/2020 - Present * (Principal) GI bleed ICD-10-CM: K92.2 ICD-9-CM: 578.9  9/21/2020 - Present Symptomatic anemia ICD-10-CM: D64.9 ICD-9-CM: 285.9  9/21/2020 - Present History of transient ischemic attack (TIA) ICD-10-CM: N09.72 
ICD-9-CM: V12.54  5/15/2019 - Present Dysphasia ICD-10-CM: R47.02 
ICD-9-CM: 784.59  5/15/2019 - Present Hypokalemia ICD-10-CM: E87.6 ICD-9-CM: 276.8  5/15/2019 - Present Elevated serum creatinine ICD-10-CM: R79.89 ICD-9-CM: 790.99  5/15/2019 - Present Stage 3 chronic kidney disease (HCC) ICD-10-CM: N18.3 ICD-9-CM: 585.3  5/15/2019 - Present Right facial numbness ICD-10-CM: R20.0 ICD-9-CM: 782.0  5/15/2019 - Present History of CVA (cerebrovascular accident) ICD-10-CM: Z86.73 
ICD-9-CM: V12.54  5/15/2019 - Present Orthostatic hypotension ICD-10-CM: I95.1 ICD-9-CM: 458.0  10/2/2018 - Present Acquired hypothyroidism ICD-10-CM: E03.9 ICD-9-CM: 244.9  9/28/2018 - Present Anemia ICD-10-CM: D64.9 ICD-9-CM: 285.9  9/28/2018 - Present Atopic dermatitis ICD-10-CM: L20.9 ICD-9-CM: 691.8  9/28/2018 - Present Callus ICD-10-CM: L84 
ICD-9-CM: 813  9/28/2018 - Present Dementia ([facility].) ICD-10-CM: F03.90 ICD-9-CM: 294.20  9/28/2018 - Present Type II diabetes mellitus ([facility] 75.) ICD-10-CM: E11.9 ICD-9-CM: 250.00  9/28/2018 - Present Diabetic neuropathy with neurologic complication [facility]) PRL-08-MK: E11.40, E11.49 
ICD-9-CM: 250.60, 357.2  9/28/2018 - Present Essential hypertension ICD-10-CM: I10 
ICD-9-CM: 401.9  9/28/2018 - Present History of breast cancer ICD-10-CM: Z85.3 ICD-9-CM: V10.3  9/28/2018 - Present Hypercholesterolemia ICD-10-CM: E78.00 ICD-9-CM: 272.0  9/28/2018 - Present Lymphedema ICD-10-CM: I89.0 ICD-9-CM: 457.1  9/28/2018 - Present Memory loss ICD-10-CM: R41.3 ICD-9-CM: 780.93  9/28/2018 - Present Osteopenia ICD-10-CM: M85.80 ICD-9-CM: 733.90  9/28/2018 - Present RESOLVED: Headache ICD-10-CM: K74 ICD-9-CM: 784.0  5/15/2019 - 9/21/2020 RESOLVED: Abnormal urinalysis ICD-10-CM: R82.90 ICD-9-CM: 791.9  5/15/2019 - 9/21/2020 RESOLVED: UTI (urinary tract infection) ICD-10-CM: N39.0 ICD-9-CM: 599.0  5/15/2019 - 9/21/2020 Discharge/Transfer Medications Current Discharge Medication List  
  
START taking these medications Details  
midodrine (PROAMATINE) 2.5 mg tablet Take 1 Tab by mouth two (2) times daily (with meals) for 30 days. Qty: 60 Tab, Refills: 0 Omeprazole delayed release (PRILOSEC D/R) 20 mg tablet Take 1 Tab by mouth daily. Qty: 30 Tab, Refills: 0  
  
bumetanide (BUMEX) 1 mg tablet Take 1 Tab by mouth as needed (swelling or shortness of breath). Qty: 30 Tab, Refills: 0 CONTINUE these medications which have NOT CHANGED Details  
acetaminophen (TYLENOL) 500 mg tablet Take 500 mg by mouth every six (6) hours as needed for Pain.  
  
polyethylene glycol (MIRALAX) 17 gram packet Take 1 Packet by mouth daily. Qty: 30 Packet, Refills: 2 Associated Diagnoses: Constipation, unspecified constipation type ferrous sulfate 325 mg (65 mg iron) tablet Take 1 Tab by mouth two (2) times a day. Qty: 180 Tab, Refills: 1 Associated Diagnoses: Anemia, unspecified type  
  
levothyroxine (SYNTHROID) 50 mcg tablet Take 1 tablet by mouth once daily 
Qty: 90 Tab, Refills: 1 Associated Diagnoses: Acquired hypothyroidism  
  
atorvastatin (LIPITOR) 40 mg tablet Take 1 tablet by mouth once daily 
Qty: 90 Tab, Refills: 1 Associated Diagnoses: TIA (transient ischemic attack) docusate sodium (COLACE) 100 mg capsule Take 100 mg by mouth two (2) times a day. STOP taking these medications  
  
 fludrocortisone (FLORINEF) 0.1 mg tablet Comments:  
Reason for Stopping:   
   
 clopidogreL (PLAVIX) 75 mg tab Comments:  
Reason for Stopping:   
   
 glucose blood VI test strips (TRUE METRIX GLUCOSE TEST STRIP) strip Comments:  
Reason for Stopping:   
   
  
 
  
Diet:  Cardiac/Diabetic diet. Activity:  As tolerated Disposition: Home with existing home health 3d/wk Discharge instructions to patient/family Please seek medical attention for any new or worsening symptoms particularly fever, chest pain, shortness of breath, abdominal pain, nausea, vomiting Follow up plans/appointments Follow-up Information Follow up With Specialties Details Why Contact Info Angelo Mcgraw MD Family Medicine Go on 9/28/2020 For follow up of visit at 8:20am [address] [facility] [facility] [address] 
[phone] Aida Rubalcava MD Cardiology Schedule an appointment as soon as possible for a visit  [address] [facility] 
[phone] Valeria Castanon MD Gastroenterology Schedule an appointment as soon as possible for a visit  [address] [facility] [address] [facility] 
[phone] Hema Light DO Family Medicine Resident For Billing Chief Complaint Patient presents with  Abnormal Lab Results Hospital Problems  Date Reviewed: 8/31/2020 Codes Class Noted POA Paroxysmal atrial fibrillation (HCC) ICD-10-CM: I48.0 ICD-9-CM: 427.31  9/23/2020 Unknown CHF (congestive heart failure) (HCC) ICD-10-CM: I50.9 ICD-9-CM: 428.0  9/22/2020 Unknown * (Principal) GI bleed ICD-10-CM: K92.2 ICD-9-CM: 578.9  9/21/2020 Yes Symptomatic anemia ICD-10-CM: D64.9 ICD-9-CM: 285.9  9/21/2020 Unknown History of transient ischemic attack (TIA) ICD-10-CM: A32.76 
ICD-9-CM: V12.54  5/15/2019 Yes Hypokalemia ICD-10-CM: E87.6 ICD-9-CM: 276.8  5/15/2019 Yes Elevated serum creatinine ICD-10-CM: R79.89 ICD-9-CM: 790.99  5/15/2019 Yes Stage 3 chronic kidney disease (HCC) ICD-10-CM: N18.3 ICD-9-CM: 585.3  5/15/2019 Yes History of CVA (cerebrovascular accident) ICD-10-CM: Z86.73 
ICD-9-CM: V12.54  5/15/2019 Yes Orthostatic hypotension ICD-10-CM: I95.1 ICD-9-CM: 458.0  10/2/2018 Yes Acquired hypothyroidism ICD-10-CM: E03.9 ICD-9-CM: 244.9  9/28/2018 Yes Anemia ICD-10-CM: D64.9 ICD-9-CM: 285.9  9/28/2018 Yes Dementia ([email or web address] ICD-10-CM: F03.90 ICD-9-CM: 294.20  9/28/2018 Yes Type II diabetes mellitus ([facility] ICD-10-CM: E11.9 ICD-9-CM: 250.00  9/28/2018 Yes Diabetic neuropathy with neurologic complication [facility]) BQD-31-OJ: E11.40, E11.49 
ICD-9-CM: 250.60, 357.2  9/28/2018 Yes Essential hypertension ICD-10-CM: I10 
ICD-9-CM: 401.9  9/28/2018 Yes History of breast cancer ICD-10-CM: Z85.3 ICD-9-CM: V10.3  9/28/2018 Yes Hypercholesterolemia ICD-10-CM: E78.00 ICD-9-CM: 272.0  9/28/2018 Yes Osteopenia ICD-10-CM: M85.80 ICD-9-CM: 733.90  9/28/2018 Yes

## 2020-09-23 NOTE — PROGRESS NOTES
Problem: Falls - Risk of 
Goal: *Absence of Falls Description: Document Nathalie Jimenez Fall Risk and appropriate interventions in the flowsheet. Outcome: Resolved/Met Note: Fall Risk Interventions: 
Mobility Interventions: Communicate number of staff needed for ambulation/transfer, OT consult for ADLs, Patient to call before getting OOB, PT Consult for mobility concerns, PT Consult for assist device competence, Utilize walker, cane, or other assistive device, Utilize gait belt for transfers/ambulation, Bed/chair exit alarm Mentation Interventions: Adequate sleep, hydration, pain control, Door open when patient unattended, Gait belt with transfers/ambulation, Increase mobility, More frequent rounding, Reorient patient, Room close to nurse's station, Toileting rounds, Update white board, Bed/chair exit alarm Medication Interventions: Assess postural VS orthostatic hypotension, Patient to call before getting OOB, Teach patient to arise slowly, Utilize gait belt for transfers/ambulation, Bed/chair exit alarm Elimination Interventions: Call light in reach, Patient to call for help with toileting needs, Stay With Me (per policy), Toileting schedule/hourly rounds, Bed/chair exit alarm Problem: Patient Education: Go to Patient Education Activity Goal: Patient/Family Education Outcome: Resolved/Met Problem: Pressure Injury - Risk of 
Goal: *Prevention of pressure injury Description: Document [place] Scale and appropriate interventions in the flowsheet. Outcome: Resolved/Met Note: Pressure Injury Interventions: 
Sensory Interventions: Assess changes in LOC, Discuss PT/OT consult with provider, Check visual cues for pain, Keep linens dry and wrinkle-free, Maintain/enhance activity level, Minimize linen layers, Monitor skin under medical devices, Turn and reposition approx. every two hours (pillows and wedges if needed) Moisture Interventions: Absorbent underpads, Check for incontinence Q2 hours and as needed, Internal/External urinary devices, Limit adult briefs, Minimize layers, Offer toileting Q_hr Activity Interventions: Increase time out of bed, Pressure redistribution bed/mattress(bed type), PT/OT evaluation Mobility Interventions: HOB 30 degrees or less, Pressure redistribution bed/mattress (bed type), PT/OT evaluation, Turn and reposition approx. every two hours(pillow and wedges) Nutrition Interventions: Document food/fluid/supplement intake, Offer support with meals,snacks and hydration Friction and Shear Interventions: HOB 30 degrees or less, Lift sheet, Minimize layers Problem: Patient Education: Go to Patient Education Activity Goal: Patient/Family Education Outcome: Resolved/Met Problem: Patient Education: Go to Patient Education Activity Goal: Patient/Family Education Outcome: Resolved/Met

## 2020-09-23 NOTE — CONSULTS
Palliative Medicine Consult Thomas: [phone] ([id number]) Patient Name: Prosper Elizalde YOB: 1925 Date of Initial Consult: 9/23/2020 Reason for Consult: [address] Discussion Requesting Provider:  Dr. Geovanny Sofia Primary Care Physician: Gwen Burnette MD 
 
 SUMMARY:  
Prosper Elizalde is a 80 y. o. with a past history of left breast cancer s/p left mastectomy, left arm lymphedema, dementia?,  DM 2, CKD, anemia, HLD, hypothyroidism, falls and UTIs who was admitted on 9/21/2020 from PCPs office with a diagnosis of GI bleed. Current medical issues leading to Palliative Medicine involvement include: Goals of care discussion and setting of 80year-old woman with multiple co morbidities. Patient saw her PCP,+ black stools, found to be heme positive and sent patient to the ER In ER globin 7.9, + hypokalemic CXR+ central congestion. Patient admitted for GI work-up Course of hospitalization: Intermittent A. fib cardiology following, 9/22 echo with EF 35-40%, moderate to severe TV regurgitation, moderate MV regurg, + pulmonary HTN and severely elevated CVP. Results concerning for cardiac amyloidosis. GI also consulted, patient insisting on going home, team was able to divert her from leaving [facility] on 9/22 PALLIATIVE DIAGNOSES:  
1. Advanced Care planning Discussion 2. DNR Discussion 3. Goals of care discussion 4. Shortness of breath 5. Cardiomyopathy 6. Presumably amyloidosis 7. GI bleed 8. Hypoalbuminemia PLAN:  
1. Prior to visit completed extensive chart review including documentation and results of labs and other diagnostics, as well as documentation from 2020 PCP visits. 2. Patient was seen and evaluated, no family at bedside. Patient was sitting in bedside chair, awake, alert. She had her personal clothes on, though did not seem to have her jacket on correctly.  
3. Patient was alert and fairly oriented, possibly some forgetfulness, she was able to tell me why he came to the hospital, she also told me she is ready to go home, waiting for family to pick her up 4. Advance care planning discussion-patient is , no children. Patient indicated that she had completed an AMD in the past, copies of which are probably at home. Patient stated that her niece Zackary Ramirez, [phone], would serve as her healthcare decision-maker, but right now she is able to make her own decisions. 5. ACP portion of EMR updated 6. DNR discussion-we discussed CPR, patient is very clear that she does not want CPR. She stated that her  had been a undertaker and had always told her that she does not want to undergo CPR. 7. I showed the patient a durable DNR, she does not think that she has ever completed one in the past, however she agreed to complete one today. 8. A copy of the durable DNR placed in the paper chart to be scanned to the EMR and the original +2 copies given to the p 2 other family members I encouraged patient to hang her refrigerator or another area that seen by paramedics/EMS. 9. Goals of care discussion-attempted to discuss other services, possible hospice services, however patient insists she does not need any extra help, just wants to get home soon as possible waiting for her niece to pick her up. Patient wants very limited treatments and interventions, clear she does not want to keep returning to the hospital, but is not interested in having a discussion today. Will attempt to reach out to the niece prior to discharge. shortness of breath 10.  
11. GI bleed-9/22 hemoglobin 6.9, received 1 unit PRBCs 12. Shortness of breath-resolved, likely multifactorial etiology, including: Cardiomyopathy, possible cardiac amyloidosis pulmonary hypertension, pleural effusion and anemia. 13. Hypoalbuminemia-Albumin 2.4 14. See palliative ESAS 15. Initial consult note routed to primary continuity provider and/or primary health care team members 16. Communicated plan of care with: Palliative [facility] Team, unit RN  
 
 GOALS OF CARE / TREATMENT PREFERENCES:  
 
GOALS OF CARE: 
Patient/Health Care Proxy Stated Goals: Prolong life TREATMENT PREFERENCES:  
Code Status: DNR Advance Care Planning: 
[x] The [facility] Interdisciplinary Team has updated the ACP Navigator with [place] and Patient Capacity Advance Care Planning 9/23/2020 Patient's Healthcare Decision Maker is: Verbal statement (Legal Next of Kin remains as decision maker) Confirm Advance Directive Yes, not on file Does the patient have other document types Do Not Resuscitate Medical Interventions: Limited additional interventions Other Instructions Artificially Administered Nutrition: No feeding tube Other: As far as possible, the palliative care team has discussed with patient / health care proxy about goals of care / treatment preferences for patient. HISTORY:  
 
History obtained from: medical records/patient/ nurse CHIEF COMPLAINT:Im ready to go home HPI/SUBJECTIVE: The patient is:  
[x] Verbal and participatory [] Non-participatory due to:  
Patient denied pain, reported breathing better, denied shortness of breath, stated that she is ready to go home, denies other complaints or concerns Clinical Pain Assessment (nonverbal scale for severity on nonverbal patients):  
Clinical Pain Assessment Severity: 0 Duration: for how long has pt been experiencing pain (e.g., 2 days, 1 month, years) Frequency: how often pain is an issue (e.g., several times per day, once every few days, constant) FUNCTIONAL ASSESSMENT:  
 
Palliative Performance Scale (PPS): PPS: 40 PSYCHOSOCIAL/SPIRITUAL SCREENING:  
 
Palliative IDT has assessed this patient for cultural preferences / practices and a referral made as appropriate to needs (Cultural Services, Patient Advocacy, Ethics, etc.) Any spiritual / Episcopal concerns: 
[] Yes /  [x] No 
 
Caregiver Burnout: 
[] Yes /  [] No /  [x] No Caregiver Present Anticipatory grief assessment:  
[x] Normal  / [] Maladaptive ESAS Anxiety: Anxiety: 0 
 
ESAS Depression: Depression: 0 REVIEW OF SYSTEMS:  
 
Positive and pertinent negative findings in ROS are noted above in HPI. The following systems were [x] reviewed / [] unable to be reviewed as noted in HPI Other findings are noted below. Systems: constitutional, ears/nose/mouth/throat, respiratory, gastrointestinal, genitourinary, musculoskeletal, integumentary, neurologic, psychiatric, endocrine. Positive findings noted below. Modified ESAS Completed by: provider Fatigue: 5 Drowsiness: 0 Depression: 0 Pain: 0 Anxiety: 0 Nausea: 0 Anorexia: 0 Dyspnea: 1 Constipation: No  
  Stool Occurrence(s): 1 PHYSICAL EXAM:  
 
From RN flowsheet: 
Wt Readings from Last 3 Encounters:  
09/23/20 64.5 kg (142 lb 1.6 oz)  
09/21/20 63 kg (139 lb)  
09/17/20 63 kg (139 lb) Blood pressure (!) 119/56, pulse 85, temperature 97.5 °F (36.4 °C), resp. rate 16, height 5' 5\" (1.651 m), weight 64.5 kg (142 lb 1.6 oz), SpO2 98 %. Pain Scale 1: Numeric (0 - 10) Pain Intensity 1: 0 Last bowel movement, if known:  
 
Constitutional: Appears younger than stated age, adequately nourished, fatigued, somewhat irritable, NAD Eyes: pupils equal, anicteric ENMT: no nasal discharge, moist mucous membranes Cardiovascular: regular rhythm, distal pulses intact Respiratory: breathing not labored, symmetric Gastrointestinal: soft non-tender, +bowel sounds Musculoskeletal: no deformity, no tenderness to palpation Skin: warm, dry, left arm with edema Neurologic: following commands, moving all extremities Psychiatric: Restricted affect, no hallucinations Other: 
 
 
 HISTORY:  
 
Principal Problem: 
  GI bleed (9/21/2020) Active Problems: Acquired hypothyroidism (9/28/2018) Anemia (9/28/2018) Dementia ([address] (9/28/2018) Type II diabetes mellitus ([address] (9/28/2018) Diabetic neuropathy with neurologic complication ([address] (6/29/3082) Essential hypertension (9/28/2018) History of breast cancer (9/28/2018) Hypercholesterolemia (9/28/2018) Osteopenia (9/28/2018) Orthostatic hypotension (10/2/2018) History of transient ischemic attack (TIA) (5/15/2019) Hypokalemia (5/15/2019) Elevated serum creatinine (5/15/2019) Stage 3 chronic kidney disease ([address] (5/15/2019) History of CVA (cerebrovascular accident) (5/15/2019) Symptomatic anemia (9/21/2020) CHF (congestive heart failure) ([address] (9/22/2020) Paroxysmal atrial fibrillation ([address] (9/23/2020) Atrial fibrillation ([address] (9/23/2020) Past Medical History:  
Diagnosis Date  Abnormal urinalysis 5/15/2019  Anemia  Breast cancer ([address]  Cancer [facility])   
 left breast  
 Cataract  Chronic renal failure  Colonic polyp  Dementia ([address]  Diabetes ([address]  Eczema  Headache  Hyperlipemia  Hypothyroidism  Ill-defined condition   
 high cholesterol, hypothyroid  Osteopenia  Posterior vitreous detachment, left  Type 2 diabetes mellitus ([address]  UTI (urinary tract infection)  Vitamin D deficiency Past Surgical History:  
Procedure Laterality Date  BREAST SURGERY PROCEDURE UNLISTED    
 left breast lumpectomy  HX APPENDECTOMY  HX CATARACT REMOVAL    
 HX COLONOSCOPY  2009  HX GYN    
 hysterectomy [address] Left breast ,edial segmental mastectomy- 1992;  
 HX ORTHOPAEDIC    
 back Family History Problem Relation Age of Onset  No Known Problems Mother  No Known Problems Father History reviewed, no pertinent family history. Social History Tobacco Use  Smoking status: Never Smoker  Smokeless tobacco: Never Used Substance Use Topics  Alcohol use: No  
 
No Known Allergies Current Facility-Administered Medications Medication Dose Route Frequency  midodrine (PROAMATINE) tablet 2.5 mg  2.5 mg Oral BID WITH MEALS  
 0.9% sodium chloride infusion 250 mL  250 mL IntraVENous PRN  
 sodium chloride (NS) flush 5-40 mL  5-40 mL IntraVENous Q8H  
 sodium chloride (NS) flush 5-40 mL  5-40 mL IntraVENous PRN  polyethylene glycol (MIRALAX) packet 17 g  17 g Oral DAILY PRN  
 docusate sodium (COLACE) capsule 100 mg  100 mg Oral BID  ferrous sulfate tablet 325 mg  325 mg Oral BID  levothyroxine (SYNTHROID) tablet 50 mcg  50 mcg Oral DAILY  glucose chewable tablet 16 g  4 Tab Oral PRN  
 dextrose (D50W) injection syrg 12.5-25 g  25-50 mL IntraVENous PRN  
 glucagon (GLUCAGEN) injection 1 mg  1 mg IntraMUSCular PRN  
 insulin lispro (HUMALOG) injection   SubCUTAneous AC&HS  pantoprazole (PROTONIX) 40 mg in 0.9% sodium chloride 10 mL injection  40 mg IntraVENous Q12H  
 atorvastatin (LIPITOR) tablet 40 mg  40 mg Oral QHS  
 
 
 
 LAB AND IMAGING FINDINGS:  
 
Lab Results Component Value Date/Time WBC 6.4 09/22/2020 10:29 PM  
 HGB 7.4 (L) 09/22/2020 10:29 PM  
 PLATELET 115 55/98/6806 10:29 PM  
 
Lab Results Component Value Date/Time Sodium 147 (H) 09/22/2020 03:55 AM  
 Potassium 3.2 (L) 09/22/2020 03:55 AM  
 Chloride 113 (H) 09/22/2020 03:55 AM  
 CO2 27 09/22/2020 03:55 AM  
 BUN 14 09/22/2020 03:55 AM  
 Creatinine 1.50 (H) 09/22/2020 03:55 AM  
 Calcium 7.8 (L) 09/22/2020 03:55 AM  
 Magnesium 1.8 09/22/2020 03:55 AM  
 Phosphorus 2.8 09/22/2020 03:55 AM  
  
Lab Results Component Value Date/Time Alk. phosphatase 72 09/22/2020 03:55 AM  
 Protein, total 5.5 (L) 09/22/2020 03:55 AM  
 Albumin 2.4 (L) 09/22/2020 03:55 AM  
 Globulin 3.1 09/22/2020 03:55 AM  
 
Lab Results Component Value Date/Time  INR 1.1 09/21/2020 02:38 PM  
 Prothrombin time 11.6 (H) 09/21/2020 02:38 PM  
  
Lab Results Component Value Date/Time Iron 16 (L) 09/17/2020 09:20 AM  
 TIBC 267 09/17/2020 09:20 AM  
 Iron % saturation 6 (LL) 09/17/2020 09:20 AM  
 Ferritin 22 09/17/2020 09:20 AM  
  
No results found for: PH, PCO2, PO2 No components found for: [place] No results found for: CPK, CKMB Total time: 70 min Counseling / coordination time, spent as noted above: 45  min 
> 50% counseling / coordination?: yes Prolonged service was provided for  []30 min   []75 min in face to face time in the presence of the patient, spent as noted above. Time Start:  
Time End:  
Note: this can only be billed with 12596 (initial) or 01894 (follow up). If multiple start / stop times, list each separately.

## 2020-09-23 NOTE — PROGRESS NOTES
Problem: Mobility Impaired (Adult and Pediatric) Goal: *Acute Goals and Plan of Care (Insert Text) Description: FUNCTIONAL STATUS PRIOR TO ADMISSION: Pt reports ambulating using RW \"sometimes\" and often using wheelchair for home mobility. She reports her niece lives with her. Pt with history of dementia and no family available to corroborate history. Chart states niece lives nearby but not with patient. HOME SUPPORT PRIOR TO ADMISSION: home alone with intermittent assistance of niece per chart. Physical Therapy Goals Initiated 9/23/2020 1. Patient will move from supine to sit and sit to supine  in bed with supervision/set-up within 7 day(s). 2.  Patient will transfer from bed to chair and chair to bed with supervision/set-up using the least restrictive device within 7 day(s). 3.  Patient will perform sit to stand with supervision/set-up within 7 day(s). 4.  Patient will ambulate with supervision/set-up for 50 feet with the least restrictive device within 7 day(s). Outcome: Progressing Towards Goal 
  
PHYSICAL THERAPY EVALUATION Patient: Cheri Tan [de-identified]80 y.o. female) Date: 9/23/2020 Primary Diagnosis: GI bleed [K92.2] Precautions:   Fall ASSESSMENT Based on the objective data described below, the patient presents with generally decreased strength, decreased endurance, impaired standing balance, decreased safety awareness, and limited functional mobility on day 1 of admission with GI bleed. Pt with symptomatic orthostatic hypotension earlier today; she remains with BP fluctuations but denies presyncopal symptoms. She performs bed mobility, dangles edge of bed, ambulates briefly, and performs bed to chair transfer to remain out of bed after treatment. She reports dyspnea with ambulation that she states is associated with mask use and immediately resolves with removal of mask (all vitals and tele stable).  Pt requires physical assistance for all mobility, is at high fall risk, and demonstrates poor safety awareness. Strongly recommend 24-hour assistance for mobility at current functional level. Current Level of Function Impacting Discharge (mobility/balance): min assist bed mobility and transfers. Functional Outcome Measure: The patient scored 35 on the Barthel outcome measure which is indicative of 65% functional impairment. Other factors to consider for discharge: Pt initially with flat affect but more conversational as treatment progresses. Patient will benefit from skilled therapy intervention to address the above noted impairments. PLAN : 
Recommendations and Planned Interventions: bed mobility training, transfer training, gait training, therapeutic exercises, neuromuscular re-education, modalities, edema management/control, patient and family training/education, and therapeutic activities Frequency/Duration: Patient will be followed by physical therapy:  5 times a week to address goals. Recommendation for discharge: (in order for the patient to meet his/her long term goals) Therapy up to 5 days/week in SNF setting or intensive home health therapy program with 24-hour assistance. This discharge recommendation: 
Has not yet been discussed the attending provider and/or case management IF patient discharges home will need the following DME: to be determined (TBD) SUBJECTIVE:  
Patient stated \"Oh I have two wheelchairs.  Pt received supine, agreeable to PT and cleared by RN. OBJECTIVE DATA SUMMARY:  
HISTORY:   
Past Medical History:  
Diagnosis Date Abnormal urinalysis 5/15/2019 Anemia [facility])   
 left breast  
 Cataract Chronic renal failure Colonic polyp Dementia [facility] Diabetes ([facility] Eczema Headache Hyperlipemia Hypothyroidism Ill-defined condition   
 high cholesterol, hypothyroid Osteopenia Posterior vitreous detachment, left Type 2 diabetes mellitus ([facility] UTI (urinary tract infection) Vitamin D deficiency Past Surgical History:  
Procedure Laterality Date BREAST SURGERY PROCEDURE UNLISTED    
 left breast lumpectomy HX APPENDECTOMY HX CATARACT REMOVAL    
 HX COLONOSCOPY  2009 HX GYN    
 hysterectomy [address]P Left breast ,edial segmental mastectomy- 1992;  
 HX ORTHOPAEDIC    
 back Personal factors and/or comorbidities impacting plan of care: as above Home Situation Home Environment: Private residence # Steps to Enter: 0 One/Two Story Residence: One story Living Alone: No 
Support Systems: Family member(s) Patient Expects to be Discharged to[de-identified] Private residence Current DME Used/Available at Home: Walter Wheeler, rollator, Wheelchair, Commode, bedside EXAMINATION/PRESENTATION/DECISION MAKING:  
Critical Behavior: 
Neurologic State: Alert, Eyes open spontaneously Orientation Level: Oriented to person, Oriented to place, Oriented to time, Disoriented to situation Cognition: Follows commands, Memory loss Hearing: 
  
Skin:  LE exposed skin intact Edema: 3+ LUE (lymphedema at baseline); 1+ LEs. Range Of Motion: 
AROM: Generally decreased, functional 
  
  
  
  
  
  
  
Strength:   
Strength: Generally decreased, functional 
  
  
  
  
  
  
Tone & Sensation:  
Tone: Normal 
  
  
  
  
Sensation: Intact Coordination: 
Coordination: Within functional limits Functional Mobility: 
Bed Mobility: 
  
Supine to Sit: Additional time;Minimum assistance Scooting: Additional time;Contact guard assistance Transfers: 
Sit to Stand: Additional time;Minimum assistance;Contact guard assistance Stand to Sit: Additional time;Minimum assistance Balance:  
Sitting: Without support Sitting - Static: Good (unsupported) Sitting - Dynamic: Good (unsupported) Standing: With support Standing - Static: Good Standing - Dynamic : Fair Ambulation/Gait Training: 
Distance (ft): 10 Feet (ft) Assistive Device: Walker, rolling;Gait belt Ambulation - Level of Assistance: Minimal assistance Gait Abnormalities: Antalgic;Decreased step clearance Stance: Right decreased Speed/Marah: Pace decreased (<100 feet/min) Assist and cues for safe RW use. Functional Measure: 
Barthel Index: 
 
Bathin Bladder: 5 Bowels: 5 Groomin Dressin Feedin Mobility: 0 Stairs: 0 Toilet Use: 5 Transfer (Bed to Chair and Back): 10 Total: 35/100 The Barthel ADL Index: Guidelines 1. The index should be used as a record of what a patient does, not as a record of what a patient could do. 2. The main aim is to establish degree of independence from any help, physical or verbal, however minor and for whatever reason. 3. The need for supervision renders the patient not independent. 4. A patient's performance should be established using the best available evidence. Asking the patient, friends/relatives and nurses are the usual sources, but direct observation and common sense are also important. However direct testing is not needed. 5. Usually the patient's performance over the preceding 24-48 hours is important, but occasionally longer periods will be relevant. 6. Middle categories imply that the patient supplies over 50 per cent of the effort. 7. Use of aids to be independent is allowed. Mennie Barthel., Barthel, D.W. (0165). Functional evaluation: the Barthel Index. [facility] (14)2. BOB Gutierrez, Mai Bloom, Annette Foster, [place], [address] [place] [address] Ave (). Measuring the change indisability after inpatient rehabilitation; comparison of the responsiveness of the Barthel Index and Functional [place] Measure. Journal of Neurology, Neurosurgery, and Psychiatry, 66(4), 570-938. SCOTT Harley, BERNARDINO Real, & Selvin Rascon M.A. (2004.) Assessment of post-stroke quality of life in cost-effectiveness studies: The usefulness of the Barthel Index and the EuroQoL-5D. [facility], 15, 961-17 Physical Therapy Evaluation Charge Determination History Examination Presentation Decision-Making HIGH Complexity :3+ comorbidities / personal factors will impact the outcome/ POC  HIGH Complexity : 4+ Standardized tests and measures addressing body structure, function, activity limitation and / or participation in recreation  MEDIUM Complexity : Evolving with changing characteristics  Other outcome measures barthel  MEDIUM Based on the above components, the patient evaluation is determined to be of the following complexity level: MEDIUM Pain Rating: 
Denies pain Activity Tolerance:  
requires rest breaks Please refer to the flowsheet for vital signs taken during this treatment. After treatment patient left in no apparent distress:  
Sitting in chair, Call bell within reach, Bed / chair alarm activated, and LEs elevated COMMUNICATION/EDUCATION:  
The patients plan of care was discussed with: Registered nurse and Cardiology NP . Fall prevention education was provided and the patient/caregiver indicated understanding., Patient/family have participated as able in goal setting and plan of care. , and Patient/family agree to work toward stated goals and plan of care. Thank you for this referral. 
Va Jones, PT, DPT Time Calculation: 39 mins

## 2020-09-23 NOTE — PROGRESS NOTES
0500- attempted blood work x3- called ICU RN to try for labs 5416- attempted to get lab work x2- unsuccessful- pt not agreeable to any other tries at this time- FP aware of patient BP and unable to get labs

## 2020-09-23 NOTE — PROGRESS NOTES
0750: Dr. Sejal Bright on the floor. Unable to get morning labs. Patient a hard stick. Orders do not need to get labs. 0909: Patient blood pressure elevated 183/79. Notified Dr. Sejal Bright. Orders to give bumex and hold midodrine. [id number] Can to [facility] NP about florinef and midodrine. Orders to hold both. Other orders to follow. 0900: Morning medication given. 0635: Orthostatic blood pressures completed. Lying pressure 168/71. Sitting pressure 167/63. Standing pressure 140/62. No complaints of lightheadness or dizziness 1020: Patient ambulated to the bathroom. Voided and a bowel movement. Patient with complaints of dizziness. Post walk bp 127/50.  
 
1200: Patient ambulated to the bathroom. Reports no dizziness.  56. Returned to chair. 1230: Called and spoke to Dr. Sejal Bright. Attending has rounded on patient. OK to discharge 
 
1300: Notified by tele that patient HR to the 150s. ST for short burst. Now back in SR in the 80s. Patient sitting in chair eating. Asymptomatic. Notified Dr. Sejal Bright. No new orders at this time. Patient can discharge. 1420: Patient niece at bedside. Reports patient may be too unsafe to go home. Would like to speak to the doctor. Dr. Sejal Bright notified. Will be down to see the patient.

## 2020-09-23 NOTE — PROGRESS NOTES
Transition of care note: 
 
RUR 23% Plan: 1. When pt is stable to be discharged,the current  plan is for pt to return home with her caregivers and home health through [facility]. 2.Please notify me if pt is discharging today so I can assist with coordinating discharge. Meseret Larose 
[phone]

## 2020-09-23 NOTE — PROGRESS NOTES
Spiritual Care Assessment/Progress Note [address] 
 
 
NAME: Alejandra Kamara      MRN: [id number] AGE: 80 y.o. SEX: female Yarsanism Affiliation: Other Language: English  
 
9/23/2020     Total Time (in minutes): 9 Spiritual Assessment begun in SF 3 PROG CARE TELE 2 through conversation with: 
  
    [x]Patient        [] Family    [] Friend(s) Reason for Consult: Palliative Care, Initial/Spiritual Assessment Spiritual beliefs: (Please include comment if needed) [x] Identifies with a janice tradition:     
   [x] Supported by a janice community:        
   [] Claims no spiritual orientation:       
   [] Seeking spiritual identity:            
   [] Adheres to an individual form of spirituality:       
   [] Not able to assess:                   
 
    
Identified resources for coping:  
   [x] Prayer                           
   [] Music                  [] Guided Imagery [x] Family/friends                 [] Pet visits [] Devotional reading                         [] Unknown 
   [] Other:                                         
 
 
Interventions offered during this visit: (See comments for more details) Patient Interventions: Normalization of emotional/spiritual concerns, Initial/Spiritual assessment, patient floor, Prayer (assurance of), Affirmation of emotions/emotional suffering, Affirmation of janice Plan of Care: 
 
 [] Support spiritual and/or cultural needs  
 [] Support AMD and/or advance care planning process    
 [] Support grieving process 
 [] Coordinate Rites and/or Rituals  
 [] Coordination with community clergy [] No spiritual needs identified at this time 
 [] Detailed Plan of Care below (See Comments)  [] Make referral to Music Therapy 
[] Make referral to Pet Therapy    
[] Make referral to Addiction services 
[] Make referral to [facility] 
[] Make referral to Spiritual Care Partner 
[] No future visits requested [x] Follow up visits as needed Comments: 's visit was at the [facility] unit for palliative care initial spiritual assessment. Pt, Miss Regina Craft was sitting in her recliner, alert and oriented. She share what led to her hospitalization, stating she feels about the same as when she first arrived. Pt shared about her Gnosticist involvement, indicating she was involved with a group that holds prayer meetings by phones daily. Visit was interrupted by a phone call, which required her nurse to come in to update caller.  provided space for staff and advised pt to contact spiritual care as need be. She thanked  for the visit. Spiritual care is still available as able or as needed. Visited by: Paula Waddell. To kori walls: [phone])

## 2020-09-23 NOTE — PROGRESS NOTES
OCCUPATIONAL THERAPY EVALUATION/DISCHARGE Patient: Adriana Quiroz [de-identified]80 y.o. female) Date: 9/23/2020 Primary Diagnosis: GI bleed [K92.2] Precautions:  Fall ASSESSMENT Based on the objective data described below, the patient presents with decreased activity tolerance, generalized weakness, and baseline poor cognition following admission on 9/21 for GI bleed. Patient was agreeable to activity today although resistant to therapist recommendations/education. She performed transfers with x1 assist and cues needed for safety. She engaged in ADLs with fair tolerance; Increased assistance needed for standing tasks. Per family report, patient is functioning near her baseline and they are able to provide the level of assist she requires as they have been assisting prior to admission. Patient is declining further rehab despite recommendation. Will need 24/7 supervision/assistance to ensure safety. Current Level of Function (ADLs/self-care): Patient required CGA to min A for bed mobility and OOB transfers. Patient requires setup for UB ADLs and CGA to min A for LB ADLs. Functional Outcome Measure: The patient scored 35/100 on the Barthel Index outcome measure. PLAN : 
Recommendation for discharge: (in order for the patient to meet his/her long term goals) Therapy up to 5 days/week in SNF setting or an intensive home health therapy program with 24/7 supervision/assistance; Patient declining- will return home with 24/7 supervision/assist  
 
This discharge recommendation: 
Has been made in collaboration with the attending provider and/or case management IF patient discharges home will need the following DME: none SUBJECTIVE:  
Patient agreeable to OT evaluation. She presented with flat affect and appeared intermittently irritated/agitated throughout. OBJECTIVE DATA SUMMARY:  
HISTORY:  
Past Medical History:  
Diagnosis Date  Abnormal urinalysis 5/15/2019  Anemia  [facility] [address]  Cancer [facility])   
 left breast  
 Cataract  Chronic renal failure  Colonic polyp  Dementia ([facility] [address]  Diabetes ([facility] [address]  Eczema  Headache  Hyperlipemia  Hypothyroidism  Ill-defined condition   
 high cholesterol, hypothyroid  Osteopenia  Posterior vitreous detachment, left  Type 2 diabetes mellitus ([facility] [address]  UTI (urinary tract infection)  Vitamin D deficiency Past Surgical History:  
Procedure Laterality Date  BREAST SURGERY PROCEDURE UNLISTED    
 left breast lumpectomy  HX APPENDECTOMY  HX CATARACT REMOVAL    
 HX COLONOSCOPY  2009  HX GYN    
 hysterectomy [address] Left breast ,edial segmental mastectomy- 1992;  
 HX ORTHOPAEDIC    
 back Prior Level of Function/Environment/Context: Patient has caregivers at home. Expanded or extensive additional review of patient history:  
Home Situation Home Environment: Private residence # Steps to Enter: 0 One/Two Story Residence: One story Living Alone: No 
Support Systems: Family member(s) Patient Expects to be Discharged to[de-identified] Private residence Current DME Used/Available at Home: Junious Locket, rollator, Wheelchair, Commode, bedside Hand dominance: Right EXAMINATION OF PERFORMANCE DEFICITS: 
Cognitive/Behavioral Status: 
Neurologic State: Alert Orientation Level: Oriented to person Cognition: Memory loss; Follows commands;Decreased attention/concentration;Poor safety awareness Perception: Appears intact Perseveration: No perseveration noted Safety/Judgement: Decreased awareness of environment;Lack of insight into deficits Skin: Intact in the uppers Edema: None noted in the uppers Vision/Perceptual:   
Diplopia: No   
Acuity: Within Defined Limits Range of Motion: WDL in the uppers Strength: 
Strength: Generally decreased, functional 
 
Coordination: 
Coordination: Within functional limits Tone & Sensation: 
Tone: Normal 
 Sensation: Intact Balance: 
Sitting: Without support Sitting - Static: Good (unsupported) Sitting - Dynamic: Good (unsupported) Standing: With support Standing - Static: Good Standing - Dynamic : Fair Functional Mobility and Transfers for ADLs: 
Bed Mobility: 
Supine to Sit: Additional time;Minimum assistance Scooting: Additional time;Contact guard assistance Transfers: 
Sit to Stand: Contact guard assistance;Assist x1;Additional time Stand to Sit: Contact guard assistance;Assist x1;Additional time Bed to Chair: Contact guard assistance; Additional time;Assist x1 Bathroom Mobility: Contact guard assistance Toilet Transfer : Contact guard assistance ADL Assessment: 
Feeding: Setup Oral Facial Hygiene/Grooming: Setup Bathing: Minimum assistance Upper Body Dressing: Setup Lower Body Dressing: Minimum assistance Toileting: Contact guard assistance Cognitive Retraining Safety/Judgement: Decreased awareness of environment;Lack of insight into deficits Functional Measure: 
Barthel Index: 
 
Bathin Bladder: 5 Bowels: 5 Groomin Dressin Feedin Mobility: 0 Stairs: 0 Toilet Use: 5 Transfer (Bed to Chair and Back): 10 Total: 35/100 The Barthel ADL Index: Guidelines 1. The index should be used as a record of what a patient does, not as a record of what a patient could do. 2. The main aim is to establish degree of independence from any help, physical or verbal, however minor and for whatever reason. 3. The need for supervision renders the patient not independent. 4. A patient's performance should be established using the best available evidence. Asking the patient, friends/relatives and nurses are the usual sources, but direct observation and common sense are also important. However direct testing is not needed. 5. Usually the patient's performance over the preceding 24-48 hours is important, but occasionally longer periods will be relevant. 6. Middle categories imply that the patient supplies over 50 per cent of the effort. 7. Use of aids to be independent is allowed. Braulio Garcia., Barthel, DBlayneW. (3783). Functional evaluation: the Barthel Index. [facility] (14)2. [facility] BOB Lopez, Ruthy Umaña., Mehul Winn., Milena Perez, [facility] (1999). Measuring the change indisability after inpatient rehabilitation; comparison of the responsiveness of the Barthel Index and Functional Martin Measure. Journal of Neurology, Neurosurgery, and Psychiatry, 66(4), 140-830. ARACELI Herring.TARIK, BERNARDINO Real, & Joey Berry M.A. (2004.) Assessment of post-stroke quality of life in cost-effectiveness studies: The usefulness of the Barthel Index and the EuroQoL-5D. [facility], 13, 176-99 Occupational Therapy Evaluation Charge Determination History Examination Decision-Making LOW Complexity : Brief history review  LOW Complexity : 1-3 performance deficits relating to physical, cognitive , or psychosocial skils that result in activity limitations and / or participation restrictions  LOW Complexity : No comorbidities that affect functional and no verbal or physical assistance needed to complete eval tasks Based on the above components, the patient evaluation is determined to be of the following complexity level: LOW Activity Tolerance:  
Good Please refer to the flowsheet for vital signs taken during this treatment. After treatment patient left in no apparent distress:   
Sitting in chair, Call bell within reach and Bed / chair alarm activated COMMUNICATION/EDUCATION:  
The patients plan of care was discussed with: Registered nurse and patient. Erich Drew Thank you for this referral. 
Rosa Maria Enamorado OTR/L Time Calculation: 17 mins

## 2020-09-23 NOTE — PROGRESS NOTES
Cardiology Progress Note [address] [facility] [address], [place], [address] [place] [address] Phone [phone]; Fax [phone] 
 
 
 
2020 9:32 AM  
 
Admit Date:           2020 Admit Diagnosis:  GI bleed [K92.2] :          1925 MRN:          [id number] Impression Plan/Recommendation 1. PAF 2. Possible amyloid 3. Acute HFrEF, new dx 4. 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 Anemia / Hem + stools 6. Hx of orthostatic hypotension /supine hypertension 7. CKD/ALEJANDRA · D/c  florinef · + orthostatics - 160-->140- asymptomatic. She then got up to use the bathroom SBP dropped to 120 + dizziness. Will resume the midodrine at a low dose  -held this AM for hypertension · The patient and her niece do not want any further work up. · Treat conservatively - would avoid BB/ACE/ARB w orthostatic hypotension/advanced age/unsteady gait- to decrease risk of fall. · Not a candidate for DOAC/OAC given anemia w no further work up · Had IV diuretics this AM, could continue to diuresis while here. Recommend diuretic PRN on d/c. Compensated on exam, on RA 
· Agree w Palliative care consult Pt personally seen and examined. Chart reviewed. Agree with advanced NP's history, exam and  A/P with changes/additons. No further invasive procedures as per family ( documented by primary team) Low dose midodrine -will have to accept higher supine BP sec to sig orthostasis Aleks Stoner MD, [facility] No intake/output data recorded. Last 3 Recorded Weights in this Encounter  
 20 0145 20 0941 20 4326 Weight: 139 lb 11.2 oz (63.4 kg) 139 lb (63 kg) 142 lb 1.6 oz (64.5 kg) 1901 -  0700 In: 1251.7 [P.O.:460; I.V.:791.7] Out: 200 [Urine:200] SUBJECTIVE  
  
  
 
 
 
 Vivi Donaldson denies palpitations, irregular heart beat, SOB, chest pain No lightheadedness or dizziness Current Facility-Administered Medications Medication Dose Route Frequency  0.9% sodium chloride infusion 250 mL  250 mL IntraVENous PRN  
 sodium chloride (NS) flush 5-40 mL  5-40 mL IntraVENous Q8H  
 sodium chloride (NS) flush 5-40 mL  5-40 mL IntraVENous PRN  polyethylene glycol (MIRALAX) packet 17 g  17 g Oral DAILY PRN  
 docusate sodium (COLACE) capsule 100 mg  100 mg Oral BID  ferrous sulfate tablet 325 mg  325 mg Oral BID  levothyroxine (SYNTHROID) tablet 50 mcg  50 mcg Oral DAILY  glucose chewable tablet 16 g  4 Tab Oral PRN  
 dextrose (D50W) injection syrg 12.5-25 g  25-50 mL IntraVENous PRN  
 glucagon (GLUCAGEN) injection 1 mg  1 mg IntraMUSCular PRN  
 insulin lispro (HUMALOG) injection   SubCUTAneous AC&HS  pantoprazole (PROTONIX) 40 mg in 0.9% sodium chloride 10 mL injection  40 mg IntraVENous Q12H  
 atorvastatin (LIPITOR) tablet 40 mg  40 mg Oral QHS OBJECTIVE Intake/Output Summary (Last 24 hours) at 9/23/2020 0932 Last data filed at 9/22/2020 2027 Gross per 24 hour Intake 460 ml Output  Net 460 ml Review of Systems - History obtained from the patient AS PER  HPI Telemetry NSR 
 
PHYSICAL EXAM  
  
 
Visit Vitals BP (!) 166/82 (BP 1 Location: Right arm, BP Patient Position: At rest) Pulse 76 Temp 98.2 °F (36.8 °C) Resp 15 Ht 5' 5\" (1.651 m) Wt 142 lb 1.6 oz (64.5 kg) LMP  (LMP Unknown) SpO2 95% BMI 23.65 kg/m² Gen: Well-developed, frail, elderly , in no acute distress  alert and HEENT:  Pink conjunctivae, Hearing grossly normal.No scleral icterus or conjunctival, moist mucous membranes Neck: Supple,+ JVD Resp: No accessory muscle use, scattered crackles dmitri Card: Regular Rate,Rythm,2/6 murmur, norubs or gallop. No thrills. GI:          soft, non-tender MSK: No cyanosis or clubbing, good capillary refill Skin: No rashes or ulcers,  bruising Neuro:  Cranial nerves are grossly intact, moving all four extremities, no focal deficit, follows commands appropriately Psych:  Good insight, oriented to person, place and time, alert, Nml Affect LE: +1 pitting BLE  Larry. Lymphedema LUE 
  
 
DATA REVIEW Laboratory and Imaging have been reviewed by me and are notable for Recent Labs  
  09/21/20 
1438 TROIQ <0.05 Recent Labs  
  09/22/20 
2229 09/22/20 
0941 09/22/20 
0355 09/21/20 
2304 09/21/20 
1438 NA  --   --  147* 148* 146* K  --   --  3.2* 3.2* 2.8*  
CO2  --   --  27 30 28 BUN  --   --  14 14 12 CREA  --   --  1.50* 1.47* 1.64* GLU  --   --  86 95 219* PHOS  --   --  2.8  --   --   
MG  --   --  1.8  --  2.1 WBC 6.4 5.6 5.5 6.0 5.0 HGB 7.4* 7.0* 6.9* 7.1* 7.2* HCT 26.9* 25.5* 25.6* 26.4* 27.2*  
 308 320 352 368 Nallely Rae NP

## 2020-09-24 ENCOUNTER — PATIENT OUTREACH (OUTPATIENT)
Dept: CASE MANAGEMENT | Age: 85
End: 2020-09-24

## 2020-09-24 ENCOUNTER — HOME CARE VISIT (OUTPATIENT)
Dept: HOME HEALTH SERVICES | Facility: HOME HEALTH | Age: 85
End: 2020-09-24
Payer: MEDICARE

## 2020-09-24 ENCOUNTER — TELEPHONE (OUTPATIENT)
Dept: FAMILY MEDICINE CLINIC | Age: 85
End: 2020-09-24

## 2020-09-24 ENCOUNTER — HOME CARE VISIT (OUTPATIENT)
Dept: SCHEDULING | Facility: HOME HEALTH | Age: 85
End: 2020-09-24
Payer: MEDICARE

## 2020-09-24 PROCEDURE — 3331090002 HH PPS REVENUE DEBIT

## 2020-09-24 PROCEDURE — 3331090001 HH PPS REVENUE CREDIT

## 2020-09-24 NOTE — ACP (ADVANCE CARE PLANNING)
Advance Care Planning:  
Does patient have an Advance Directive:  currently not on file; education provided

## 2020-09-24 NOTE — PROGRESS NOTES
Called and spoke to patient briefly, then called and spoke to her niece, Josh Slaughter. (on HIPPA). Patient was admitted to [facility] on  and discharged on  for GIB. Patient was contacted within 1 business days of discharge. Top Discharge Challenges to be reviewed by the provider Additional needs identified to be addressed with provider yes Per niece, doesn't have 2 caregivers each day, only has niece. Rest of family will not assist d/t patient's irritability and anger. Has personal care aides through [facility] but they don't last long and usually quit very quickly.  referral needed. Dx-GIB. Hgb 6.9; received 1 unit PRBCs. Please monitor. Discussed COVID-19 related testing which was not done at this time. Test results were not done. Method of communication with provider : chart routing Advance Care Planning:  
Does patient have an Advance Directive:  currently not on file; education provided Inpatient Readmission Risk score: 23% Was this a readmission? yes Patient stated reason for the admission: blood in stool, pcp told her to go to hospital 
Patients top risk factors for readmission: functional cognitive ability, functional physical ability, falls, ineffective coping, medical condition, support system and caregiver stress Interventions to address risk factors: spoke with nieceDenice(on HIPPA). Reviewed meds and discharge instructions. Reviewed fall precautions and red flags. WATSON with pcp  at 4:74XV. Reminded Denice to schedule appt with cardio, , for asap and f/u appt with GI, . [facility] to resume care. Has [employer] aides 4 hours, 3 x week. Given info on [facility]. Given CTN contact info if any questions/concerns. Care Transition Nurse (CTN) contacted the family by telephone to perform post hospital discharge assessment.  Verified name and  with family as identifiers. Provided introduction to self, and explanation of the CTN role. Niece reports she is a retired LPN, [de-identified]years old. Has been living with aunt for 20 months. Drives her to [place] and prepares her meds. Her son picked up new meds, cost $50, aunt would not pay for them. India Holden is very [place] and irritable\" and rest of family won't have anything to do with her. Has personal care aides, but they quit quickly after seeing demeanor of the patient. CTN reviewed discharge instructions, medical action plan and red flags with family who verbalized understanding. Family given an opportunity to ask questions and does not have any further questions or concerns at this time. The family agrees to contact the PCP office for questions related to their healthcare. Medication reconciliation was performed with family, who verbalizes understanding of administration of home medications. Advised obtaining a 90-day supply of all daily and as-needed medications. Referral to Pharm D needed: no  
 
Home Health/Outpatient orders at discharge: home health care, PT and OT [facility]: [facility] Date of initial visit: pending for today. Durable Medical Equipment ordered at discharge: none [address]: na 
Durable Medical Equipment received: na 
 
Covid Risk Education Patient has following risk factors of: pneumonia, diabetes and chronic kidney disease. Education provided regarding infection prevention, and signs and symptoms of COVID-19 and when to seek medical attention with family who verbalized understanding. Discussed exposure protocols and quarantine From CDC: Are you at higher risk for severe illness?  and given an opportunity for questions and concerns. The family agrees to contact the COVID-19 hotline [phone] or PCP office for questions related to COVID-19. For more information on steps you can take to protect yourself, see CDC's How to Protect Yourself Patient/family/caregiver given information for [employer] and agrees to enroll no Patient's preferred e-mail: declines Patient's preferred phone number: declines Discussed follow-up appointments. If no appointment was previously scheduled, appointment scheduling offered: yes [facility] follow up appointment(s):  
Future Appointments Date Time Provider Leona Dennison 9/24/2020 To Be Determined Rocklin Habermann, RN [address]  
9/28/2020  8:20 AM Jonathan Dickens MD [facility] follow up appointment(s): Jama to schedule appt with (cardio) and (GI). Plan for follow-up call in 7-10 days based on severity of symptoms and risk factors. CTN provided contact information for future needs. Goals Addressed This Visit's Progress  Prevent complications post hospitalization. 9/24/2020 [facility] 9/21-9/23 GIB ? Reviewed discharge directions with jama. ? Reviewed meds, education on new meds provided. ? Reviewed red flags: sob,fever,nausea,vomiting,abdominal pain, blood in stool,chest pain. ? WATSON with pcp 9/28 8:20am. 
? Reminded jama to call ,cardio, to sched f/u asap. ? Also needs f/u with GI, . 
? Given info on [facility] as resource. ? Given CTN contact info if any questions/concerns.  
? CTN to call back in about a week, sooner prn.dominic

## 2020-09-25 LAB
ABO + RH BLD: NORMAL
BLD PROD TYP BPU: NORMAL
BLOOD GROUP ANTIBODIES SERPL: NORMAL
BPU ID: NORMAL
CROSSMATCH RESULT,%XM: NORMAL
SPECIMEN EXP DATE BLD: NORMAL
STATUS OF UNIT,%ST: NORMAL
UNIT DIVISION, %UDIV: 0

## 2020-09-25 PROCEDURE — 3331090001 HH PPS REVENUE CREDIT

## 2020-09-25 PROCEDURE — 3331090002 HH PPS REVENUE DEBIT

## 2020-09-26 PROCEDURE — 3331090001 HH PPS REVENUE CREDIT

## 2020-09-26 PROCEDURE — 3331090002 HH PPS REVENUE DEBIT

## 2020-09-27 PROCEDURE — 3331090002 HH PPS REVENUE DEBIT

## 2020-09-27 PROCEDURE — 3331090001 HH PPS REVENUE CREDIT

## 2020-09-27 NOTE — PROGRESS NOTES
THis RN called mrs covarrubias thursday sept 24 to schedule a time to come to her home and do her resumption of care. mrs covarrubias stated she did not want any one of us coming back out to her home. i explained we were there to assess bp, assist with medications and therapy for strengthening. Mrs Kassandra Mckeon absolutely refused all disciplines at this time. i called DR. Tompkins office and left a message regarding patient refusing SUSAN and my phone  numebr with any questions or concerns

## 2020-09-27 NOTE — PROGRESS NOTES
called and spoke with mrs covarrubias regarding coming to perform resumption of care. patient adimontly refused any more nursing or therapy visits. no resumption made. SN called DR portillo office and left a message regarding patients refusal. no resumption of care made.

## 2020-09-30 ENCOUNTER — DOCUMENTATION ONLY (OUTPATIENT)
Dept: FAMILY MEDICINE CLINIC | Age: 85
End: 2020-09-30

## 2020-09-30 ENCOUNTER — OFFICE VISIT (OUTPATIENT)
Dept: FAMILY MEDICINE CLINIC | Age: 85
End: 2020-09-30
Payer: MEDICARE

## 2020-09-30 VITALS
HEART RATE: 76 BPM | OXYGEN SATURATION: 98 % | HEIGHT: 65 IN | SYSTOLIC BLOOD PRESSURE: 134 MMHG | BODY MASS INDEX: 22.99 KG/M2 | WEIGHT: 138 LBS | TEMPERATURE: 97.7 F | DIASTOLIC BLOOD PRESSURE: 62 MMHG | RESPIRATION RATE: 18 BRPM

## 2020-09-30 DIAGNOSIS — D64.9 SYMPTOMATIC ANEMIA: Primary | ICD-10-CM

## 2020-09-30 DIAGNOSIS — I50.22 CHRONIC SYSTOLIC CONGESTIVE HEART FAILURE (HCC): ICD-10-CM

## 2020-09-30 DIAGNOSIS — I95.1 ORTHOSTATIC HYPOTENSION: ICD-10-CM

## 2020-09-30 PROCEDURE — G8427 DOCREV CUR MEDS BY ELIG CLIN: HCPCS | Performed by: FAMILY MEDICINE

## 2020-09-30 PROCEDURE — 1111F DSCHRG MED/CURRENT MED MERGE: CPT | Performed by: FAMILY MEDICINE

## 2020-09-30 PROCEDURE — 99495 TRANSJ CARE MGMT MOD F2F 14D: CPT | Performed by: FAMILY MEDICINE

## 2020-09-30 NOTE — PROGRESS NOTES
Transitional Care Management Progress Note Patient: Mercedes Ledezma : 1925 PCP: Yoly Mckeon MD 
 
Date of admission: 20 Date of discharge: 20 Patient was contacted by Transitional Care Management services within two days after her discharge: Yes. This encounter and supporting documentation was reviewed if available. Medication reconciliation was performed today (2020). Assessment/Plan:  
Diagnoses and all orders for this visit: 
 
1. Orthostatic hypotension No falls or dizziness 2. Chronic systolic congestive heart failure ([address] Stable and free of dyspnea 3. Symptomatic anemia Tolerating iron. FU in 3-4 weeks to recheck CBC and status Subjective:  
Mercedes Ledezma is a 80 y.o. female presenting today for follow-up after being discharged from [facility]. The discharge summary was reviewed. The main problem requiring admission was anemia and GI bleeding. Complications during admission: CHF, a fib, edema Interval history/Current status: feeling better, no blood in stool, Off plavix and flourinef. Admitting symptoms have: significantly improved No blood in stool Eating well Tolerating iron and other meds No dyspnea, No chest pain. Taking bumex most days. DC summary reviewed Med rec done. Meds reviewed with patient and caretaker. Medications marked \"taking\" at this time: 
Home Medications Medication Sig Start Date End Date Taking? Authorizing Provider  
midodrine (PROAMATINE) 2.5 mg tablet Take 1 Tab by mouth two (2) times daily (with meals) for 30 days. 9/23/20 10/23/20  Dwaine Barber, DO Omeprazole delayed release (PRILOSEC D/R) 20 mg tablet Take 1 Tab by mouth daily. 20   Jennifer Stewart, DO  
bumetanide (BUMEX) 1 mg tablet Take 1 Tab by mouth as needed (swelling or shortness of breath).  20   Jennifer Stewart, DO  
acetaminophen (TYLENOL) 500 mg tablet Take 500 mg by mouth every six (6) hours as needed for Pain. Provider, Historical  
polyethylene glycol (MIRALAX) 17 gram packet Take 1 Packet by mouth daily. Patient taking differently: Take 17 g by mouth two (2) times daily as needed. 8/31/20   Angelica Dasilva MD  
ferrous sulfate 325 mg (65 mg iron) tablet Take 1 Tab by mouth two (2) times a day. 8/31/20   Angelica Dasilva MD  
levothyroxine (SYNTHROID) 50 mcg tablet Take 1 tablet by mouth once daily 8/25/20   Angelica Dasilva MD  
atorvastatin (LIPITOR) 40 mg tablet Take 1 tablet by mouth once daily 6/25/20   Angelica Dasilva MD  
docusate sodium (COLACE) 100 mg capsule Take 100 mg by mouth two (2) times a day. Angelica Dasilva MD  
  
 
Review of Systems: 
No chest pain or dyspnea No falls No blood in urine or stool No dizziness No orthopnea. No syncope. Objective:  
/62 (BP 1 Location: Right arm, BP Patient Position: Sitting)   Pulse 76   Temp 97.7 °F (36.5 °C) (Oral)   Resp 18   Ht 5' 5\" (1.651 m)   Wt 138 lb (62.6 kg)   LMP  (LMP Unknown)   SpO2 98%   BMI 22.96 kg/m² General Well appearing, pale Neck without nodes normal thyroid Lungs clear to ausculation CV RRR, No M, R, or G. 
1+ edema to just above ankle. Trude Roers Neuro Normal Speech Psych Oriented to person place  with normal affect and mood. No hallucinations or abnormal thought We discussed the expected course, resolution and complications of the diagnosis(es) in detail. Medication risks, benefits, costs, interactions, and alternatives were discussed as indicated. I advised her to contact the office if her condition worsens, changes or fails to improve as anticipated. She expressed understanding with the diagnosis(es) and plan.   
 
Simone Apple MD

## 2020-09-30 NOTE — PROGRESS NOTES
Identified pt with two pt identifiers(name and ). Reviewed record in preparation for visit and have obtained necessary documentation. Chief Complaint Patient presents with  
Major Hospital Follow Up  
  blood in the stool Health Maintenance Due Topic  Foot Exam Q1   
 Eye Exam Retinal or Dilated  Bone Densitometry (Dexa) Screening  MICROALBUMIN Q1   
 Shingrix Vaccine Age 50> (2 of 2)  Medicare Yearly Exam   
 Lipid Screen  Flu Vaccine (1) Coordination of Care Questionnaire: 
:  
1) Have you been to an emergency room, urgent care, or hospitalized since your last visit? If yes, where when, and reason for visit? Yes Blood in stool 2. Have seen or consulted any other health care provider since your last visit? If yes, where when, and reason for visit?  No

## 2020-09-30 NOTE — LETTER
2020 1:04 PM 
 
Ms. Oneyda Alicea  1925 
[address] [facility] [id number] To whom it may concern: 
 
Please supply with in home hospital bed for multiple diagnoses: 
 
Extreme age and general debility CHF Dementia Symptomatic anemia GI hemorrhage Orthostatic hypotension Half bed rails Sincerely, Jimmy Vogt MD

## 2020-10-07 ENCOUNTER — PATIENT OUTREACH (OUTPATIENT)
Dept: CASE MANAGEMENT | Age: 85
End: 2020-10-07

## 2020-10-07 NOTE — PROGRESS NOTES
Called and spoke to patient. She was a little hard of hearing so CTN spoke loudly. Says she is ok, sometimes sob but after coughing , can cough up phlegm, and then better. Says she is taking all meds as ordered. Asked if niece was still staying with her but she said niece was \"no longer helping. \" Asked who she had to help her in the home, told CTN that she has a \"girl\" that comes in to take care of her each day. Did have to get a friend to go to grocery store for her but the \"girl\" helps with everything else. Per chart, patient saw Eda Hernandez on 9/30 for [facility] visit. To f/u in 3-4 weeks. Reminded patient to schedule f/u with pcp as advised. Gave her CTN contact info if any questions/concerns.

## 2020-10-15 DIAGNOSIS — K21.9 GASTROESOPHAGEAL REFLUX DISEASE WITHOUT ESOPHAGITIS: ICD-10-CM

## 2020-10-15 DIAGNOSIS — I50.22 CHRONIC SYSTOLIC CONGESTIVE HEART FAILURE (HCC): ICD-10-CM

## 2020-10-15 DIAGNOSIS — I95.1 ORTHOSTATIC HYPOTENSION: Primary | ICD-10-CM

## 2020-10-15 RX ORDER — MIDODRINE HYDROCHLORIDE 2.5 MG/1
2.5 TABLET ORAL 2 TIMES DAILY WITH MEALS
Qty: 60 TAB | Refills: 2 | Status: SHIPPED | OUTPATIENT
Start: 2020-10-15 | End: 2020-11-14

## 2020-10-15 RX ORDER — BUMETANIDE 1 MG/1
1 TABLET ORAL AS NEEDED
Qty: 30 TAB | Refills: 2 | Status: SHIPPED | OUTPATIENT
Start: 2020-10-15

## 2020-10-15 RX ORDER — PHENOL/SODIUM PHENOLATE
20 AEROSOL, SPRAY (ML) MUCOUS MEMBRANE DAILY
Qty: 30 TAB | Refills: 2 | Status: SHIPPED | OUTPATIENT
Start: 2020-10-15 | End: 2021-01-31

## 2020-10-15 RX ORDER — MIDODRINE HYDROCHLORIDE 2.5 MG/1
2.5 TABLET ORAL 2 TIMES DAILY WITH MEALS
Qty: 60 TAB | Refills: 0 | OUTPATIENT
Start: 2020-10-15 | End: 2020-11-14

## 2020-10-15 RX ORDER — OMEPRAZOLE 20 MG/1
CAPSULE, DELAYED RELEASE ORAL
Qty: 30 CAP | Refills: 0 | OUTPATIENT
Start: 2020-10-15

## 2020-10-15 RX ORDER — BUMETANIDE 1 MG/1
1 TABLET ORAL AS NEEDED
Qty: 30 TAB | Refills: 0 | OUTPATIENT
Start: 2020-10-15

## 2020-10-15 NOTE — TELEPHONE ENCOUNTER
10/15/20  5:59 PM    1. Orthostatic hypotension  - midodrine (PROAMATINE) 2.5 mg tablet; Take 1 Tab by mouth two (2) times daily (with meals) for 30 days. Dispense: 60 Tab; Refill: 2    2. Chronic systolic congestive heart failure (HCC)  - bumetanide (BUMEX) 1 mg tablet; Take 1 Tab by mouth as needed (swelling or shortness of breath). Dispense: 30 Tab; Refill: 2    3. Gastroesophageal reflux disease without esophagitis  - Omeprazole delayed release (PRILOSEC D/R) 20 mg tablet; Take 1 Tab by mouth daily. Dispense: 30 Tab;  Refill: 2      Kassie Lam MD

## 2020-10-21 ENCOUNTER — PATIENT OUTREACH (OUTPATIENT)
Dept: CASE MANAGEMENT | Age: 85
End: 2020-10-21

## 2020-10-22 NOTE — PROGRESS NOTES
Called and LM 10/21 . Called again, 10/22- phone busy x 2. LM on 56/38 for jama Villa. No response. Will closed episode at this time as unable to contact patient.

## 2020-12-18 ENCOUNTER — OFFICE VISIT (OUTPATIENT)
Dept: FAMILY MEDICINE CLINIC | Age: 85
End: 2020-12-18
Payer: MEDICARE

## 2020-12-18 ENCOUNTER — HOSPITAL ENCOUNTER (OUTPATIENT)
Dept: GENERAL RADIOLOGY | Age: 85
Discharge: HOME | End: 2020-12-18
Attending: FAMILY MEDICINE
Payer: MEDICARE

## 2020-12-18 VITALS
BODY MASS INDEX: 22.99 KG/M2 | HEART RATE: 69 BPM | OXYGEN SATURATION: 99 % | RESPIRATION RATE: 16 BRPM | DIASTOLIC BLOOD PRESSURE: 69 MMHG | HEIGHT: 65 IN | WEIGHT: 138 LBS | SYSTOLIC BLOOD PRESSURE: 160 MMHG | TEMPERATURE: 98.1 F

## 2020-12-18 DIAGNOSIS — F03.90 DEMENTIA WITHOUT BEHAVIORAL DISTURBANCE, UNSPECIFIED DEMENTIA TYPE: ICD-10-CM

## 2020-12-18 DIAGNOSIS — I50.22 CHRONIC SYSTOLIC CONGESTIVE HEART FAILURE (HCC): ICD-10-CM

## 2020-12-18 DIAGNOSIS — M25.512 ACUTE PAIN OF LEFT SHOULDER: Primary | ICD-10-CM

## 2020-12-18 DIAGNOSIS — R60.0 EDEMA OF LEFT UPPER ARM: ICD-10-CM

## 2020-12-18 DIAGNOSIS — M25.512 ACUTE PAIN OF LEFT SHOULDER: ICD-10-CM

## 2020-12-18 PROCEDURE — 73080 X-RAY EXAM OF ELBOW: CPT

## 2020-12-18 PROCEDURE — 1090F PRES/ABSN URINE INCON ASSESS: CPT | Performed by: FAMILY MEDICINE

## 2020-12-18 PROCEDURE — 1100F PTFALLS ASSESS-DOCD GE2>/YR: CPT | Performed by: FAMILY MEDICINE

## 2020-12-18 PROCEDURE — G8536 NO DOC ELDER MAL SCRN: HCPCS | Performed by: FAMILY MEDICINE

## 2020-12-18 PROCEDURE — 73100 X-RAY EXAM OF WRIST: CPT

## 2020-12-18 PROCEDURE — 99214 OFFICE O/P EST MOD 30 MIN: CPT | Performed by: FAMILY MEDICINE

## 2020-12-18 PROCEDURE — G8427 DOCREV CUR MEDS BY ELIG CLIN: HCPCS | Performed by: FAMILY MEDICINE

## 2020-12-18 PROCEDURE — 73030 X-RAY EXAM OF SHOULDER: CPT

## 2020-12-18 PROCEDURE — 3288F FALL RISK ASSESSMENT DOCD: CPT | Performed by: FAMILY MEDICINE

## 2020-12-18 PROCEDURE — G8510 SCR DEP NEG, NO PLAN REQD: HCPCS | Performed by: FAMILY MEDICINE

## 2020-12-18 PROCEDURE — G8420 CALC BMI NORM PARAMETERS: HCPCS | Performed by: FAMILY MEDICINE

## 2020-12-18 NOTE — PROGRESS NOTES
Assessment and Plan 1. Acute pain of left shoulder With new pain left shoulder, arm edema and fall, will check for fracture of LUE 
- XR SHOULDER LT AP/LAT MIN 2 V; Future 2. Edema of left upper arm As above - XR ELBOW LT MIN 3 V; Future - XR WRIST LT AP/LAT; Future 3. Chronic systolic congestive heart failure ([address] More edema than in past.  Apparently has not been getting her meds or maybe even some meals so that would account for increased edema. No resp distress so have asked stepdaughter to make sure she is getting and has her meds 4. Dementia without behavioral disturbance, unspecified dementia type ([address] Stable with gradual decline. Mrs Trinidad Whitehead is alert but deferring to stepdaughter to answer questions about her condition and circumstances. Follow-up and Dispositions · Return in about 1 month (around 1/18/2021) for Blood pressure follow up, Medication follow up. Diagnosis and plan discussed with patient who verbillized understanding. History of present Juan Daniel Manzo is a 80 y.o. female presenting for Other (swelling on hands and feet) Left hand swelling Stepdaughter noticed this 4 days ago Also has pain in right hip and leg Last fall was a month ago per Mrs Trinidad Whitehead, hit head. Step daughter has her using walker. Usually has an aide there every day. Aide has not been there and stepdaughter had to take over. Unclear if and when aide will be back. Stepdaughter notes patient was missing meds and maybe meals. Has a weekly med box that still has meds in it from earlier in the week Stepdaughter does not know if patient has her meds or is out of them Review of Systems Unable to perform ROS: Dementia Respiratory: Negative for shortness of breath. Cardiovascular: Negative for chest pain. Musculoskeletal: Positive for falls and joint pain. Psychiatric/Behavioral: Positive for memory loss. Past Medical History:  
Diagnosis Date  Abnormal urinalysis 5/15/2019  Anemia  Breast cancer ([facility]  Cancer [facility])   
 left breast  
 Cataract  Chronic renal failure  Colonic polyp  Dementia ([facility]  Diabetes ([facility]  Eczema  Headache  Hyperlipemia  Hypothyroidism  Ill-defined condition   
 high cholesterol, hypothyroid  Osteopenia  Posterior vitreous detachment, left  Type 2 diabetes mellitus ([facility]  UTI (urinary tract infection)  Vitamin D deficiency Past Surgical History:  
Procedure Laterality Date  BREAST SURGERY PROCEDURE UNLISTED    
 left breast lumpectomy  HX APPENDECTOMY  HX CATARACT REMOVAL    
 HX COLONOSCOPY  2009  HX GYN    
 hysterectomy [id number] Left breast ,edial segmental mastectomy- 1992;  
 HX ORTHOPAEDIC    
 back Family History Problem Relation Age of Onset  No Known Problems Mother  No Known Problems Father Social History Socioeconomic History  Marital status:  Spouse name: Not on file  Number of children: Not on file  Years of education: Not on file  Highest education level: Not on file Occupational History  Not on file Social Needs  Financial resource strain: Not on file  Food insecurity Worry: Not on file Inability: Not on file  Transportation needs Medical: Not on file Non-medical: Not on file Tobacco Use  Smoking status: Never Smoker  Smokeless tobacco: Never Used Substance and Sexual Activity  Alcohol use: No  
 Drug use: No  
 Sexual activity: Not Currently Lifestyle  Physical activity Days per week: Not on file Minutes per session: Not on file  Stress: Not on file Relationships  Social connections Talks on phone: Not on file Gets together: Not on file Attends Mu-ism service: Not on file Active member of club or organization: Not on file Attends meetings of clubs or organizations: Not on file Relationship status: Not on file  Intimate partner violence Fear of current or ex partner: Not on file Emotionally abused: Not on file Physically abused: Not on file Forced sexual activity: Not on file Other Topics Concern  Not on file Social History Narrative ** Merged History Encounter **  
    
 
 
 
Prior to Admission medications Medication Sig Start Date End Date Taking? Authorizing Provider  
bumetanide (BUMEX) 1 mg tablet Take 1 Tab by mouth as needed (swelling or shortness of breath). 10/15/20   Mary Anderson MD  
Omeprazole delayed release (PRILOSEC D/R) 20 mg tablet Take 1 Tab by mouth daily. 10/15/20   Mary Anderson MD  
acetaminophen (TYLENOL) 500 mg tablet Take 500 mg by mouth every six (6) hours as needed for Pain. Provider, Shelly  
polyethylene glycol (MIRALAX) 17 gram packet Take 1 Packet by mouth daily. Patient taking differently: Take 17 g by mouth two (2) times daily as needed. 8/31/20   Mary Anderson MD  
ferrous sulfate 325 mg (65 mg iron) tablet Take 1 Tab by mouth two (2) times a day. 8/31/20   Mary Anderson MD  
levothyroxine (SYNTHROID) 50 mcg tablet Take 1 tablet by mouth once daily 8/25/20   Mary Anderson MD  
atorvastatin (LIPITOR) 40 mg tablet Take 1 tablet by mouth once daily 6/25/20   Mary Anderson MD  
docusate sodium (COLACE) 100 mg capsule Take 100 mg by mouth two (2) times a day. Mary Anderson MD  
  
 
No Known Allergies Vitals:  
 12/18/20 1315 BP: (!) 160/69 Pulse: 69 Resp: 16 Temp: 98.1 °F (36.7 °C) TempSrc: Oral  
SpO2: 99% Weight: 138 lb (62.6 kg) Height: 5' 5\" (1.651 m) Body mass index is 22.96 kg/m². Objective Physical Exam 
Vitals signs and nursing note reviewed. Constitutional:   
   Appearance: Normal appearance. She is not toxic-appearing. HENT:  
   Head: Normocephalic and atraumatic. Neck: Musculoskeletal: No muscular tenderness. Cardiovascular:  
   Rate and Rhythm: Normal rate and regular rhythm. Heart sounds: Normal heart sounds. No murmur. No gallop. Comments: Edema to 4 inches above ankles bilaterally Pulmonary:  
   Effort: Pulmonary effort is normal. No respiratory distress. Breath sounds: Normal breath sounds. No wheezing, rhonchi or rales. Musculoskeletal:  
   Right lower le+ Pitting Edema present. Left lower le+ Pitting Edema present. Lymphadenopathy:  
   Cervical: No cervical adenopathy. Neurological:  
   Mental Status: She is alert. Psychiatric:     
   Attention and Perception: Attention normal.     
   Mood and Affect: Mood normal.     
   Speech: Speech normal.     
   Behavior: Behavior normal. Behavior is cooperative. Thought Content: Thought content normal.     
   Cognition and Memory: Cognition is impaired. She exhibits impaired recent memory and impaired remote memory. Comments: Sleepy during appointment Right arm edematous. FROM of elbow and wrist without pain No pain of humerus shaft or forearm to palpation No axillary adenopathy Tender (mild) to palpation of shoulder and especially humeral head.

## 2020-12-21 NOTE — PROGRESS NOTES
Call Mrs. Bennett family. Nothing is fractured. She needs to take her medications correctly and see me after the holidays for a recheck.   RH

## 2020-12-23 ENCOUNTER — TELEPHONE (OUTPATIENT)
Dept: FAMILY MEDICINE CLINIC | Age: 85
End: 2020-12-23

## 2020-12-23 NOTE — TELEPHONE ENCOUNTER
TC- to Mrs. Alicea Name and  verified - with Dr Ethan Daniels message about recent xray results.  Patient verbally understood and will make a appt to f/u next month

## 2020-12-23 NOTE — TELEPHONE ENCOUNTER
----- Message from Blank Ly MD sent at 12/21/2020  8:16 AM EST -----  Call Mrs. Bennett family. Nothing is fractured. She needs to take her medications correctly and see me after the holidays for a recheck.   RH
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
## 2021-01-26 DIAGNOSIS — I95.1 ORTHOSTATIC HYPOTENSION: Primary | ICD-10-CM

## 2021-01-26 NOTE — PROGRESS NOTES
Ordered Home health again at patient's request to help with overall strength and fall prevention.   Called patient who requested this  [facility]

## 2021-04-13 RX ORDER — DOCUSATE SODIUM 100 MG/1
100 CAPSULE, LIQUID FILLED ORAL 2 TIMES DAILY
Qty: 180 CAP | Refills: 1 | Status: SHIPPED | OUTPATIENT
Start: 2021-04-13

## 2021-04-13 NOTE — TELEPHONE ENCOUNTER
----- Message from Vanessa Mace sent at 4/13/2021  3:37 PM EDT -----  Regarding: Dr. Marybel Moy Message/Vendor Calls    Caller's first and last name: n/a      Reason for call: Med questions      Callback required yes/no and why: yes      Best contact number(s): [phone]      Details to clarify the request: n/a      Vanessa Nakita

## 2021-04-13 NOTE — TELEPHONE ENCOUNTER
PCP: Sam Rossi MD    Last appt: 1/15/2021  No future appointments. Requested Prescriptions     Pending Prescriptions Disp Refills    docusate sodium (COLACE) 100 mg capsule 180 Cap 1     Sig: Take 1 Cap by mouth two (2) times a day.        Prior labs and Blood pressures:  BP Readings from Last 3 Encounters:   01/15/21 (!) 174/73   12/18/20 (!) 160/69   09/30/20 134/62     Lab Results   Component Value Date/Time    Sodium 147 (H) 09/22/2020 03:55 AM    Potassium 3.2 (L) 09/22/2020 03:55 AM    Chloride 113 (H) 09/22/2020 03:55 AM    CO2 27 09/22/2020 03:55 AM    Anion gap 7 09/22/2020 03:55 AM    Glucose 86 09/22/2020 03:55 AM    BUN 14 09/22/2020 03:55 AM    Creatinine 1.50 (H) 09/22/2020 03:55 AM    BUN/Creatinine ratio 9 (L) 09/22/2020 03:55 AM    GFR est AA 39 (L) 09/22/2020 03:55 AM    GFR est non-AA 32 (L) 09/22/2020 03:55 AM    Calcium 7.8 (L) 09/22/2020 03:55 AM     Lab Results   Component Value Date/Time    Hemoglobin A1c <3.8 (L) 09/21/2020 02:38 PM     Lab Results   Component Value Date/Time    Cholesterol, total 116 05/16/2019 05:47 AM    HDL Cholesterol 42 05/16/2019 05:47 AM    LDL, calculated 55.4 05/16/2019 05:47 AM    VLDL, calculated 18.6 05/16/2019 05:47 AM    Triglyceride 93 05/16/2019 05:47 AM    CHOL/HDL Ratio 2.8 05/16/2019 05:47 AM     No results found for: Blanca Nurse, [id number]    Lab Results   Component Value Date/Time    TSH 2.45 09/21/2020 02:38 PM

## 2021-04-26 ENCOUNTER — TELEPHONE (OUTPATIENT)
Dept: FAMILY MEDICINE CLINIC | Age: 86
End: 2021-04-26

## 2021-04-26 DIAGNOSIS — I95.1 ORTHOSTATIC HYPOTENSION: Primary | ICD-10-CM

## 2021-04-26 RX ORDER — MIDODRINE HYDROCHLORIDE 2.5 MG/1
2.5 TABLET ORAL DAILY
Qty: 90 TAB | Refills: 1 | Status: SHIPPED | OUTPATIENT
Start: 2021-04-26 | End: 2021-05-26

## 2021-04-26 NOTE — TELEPHONE ENCOUNTER
TC- to Mrs. [address] Name and  verified      Pt states that she is dizzy and had a fall last week, hurt her shoulder a bumped her head. She was told that it would be best to go to the ER to be evaluated. Patient agreed to go and will call her niece to take her to the ER. DID NOT WANT me to call the niece, she stated that she will call her herself.

## 2021-04-26 NOTE — TELEPHONE ENCOUNTER
Please advise     I see the refusals and that it was discontinue.          ----- Message from Delvin Zavala sent at 4/26/2021  9:08 AM EDT -----  Regarding: Dr. Nayla Daniel: [phone]  General Message/Vendor Calls    Caller's first and last name: Subhash Sosa, Step Daughter. Reason for call: Called last week about Rx pt is taking \"Midodrine\". She is out of Rx, was told she would get call back but has not heard back. Callback required yes/no and why: Yes, needs to discuss. Best contact number(s): [phone]      Details to clarify the request: Would like a call back today.        Delvin Zavala

## 2021-04-26 NOTE — TELEPHONE ENCOUNTER
----- Message from Jess Arroyo sent at 4/26/2021 11:54 AM EDT -----  Regarding: Dr. Schreiber Him Message/Vendor Calls    Caller's first and last name: mrs. Tony Allred      Reason for call: Questions about medication      Callback required yes/no and why: yes      Best contact number(s):[phone]      Details to clarify the request: [address]

## 2021-04-26 NOTE — TELEPHONE ENCOUNTER
04/26/21  12:57 PM    1. Orthostatic hypotension    - midodrine (PROAMATINE) 2.5 mg tablet; Take 1 Tab by mouth daily for 30 days. Dispense: 90 Tab;  Refill: 1      Rick Crook MD

## 2021-04-29 ENCOUNTER — HOME CARE VISIT (OUTPATIENT)
Dept: HOME HEALTH SERVICES | Facility: HOME HEALTH | Age: 86
End: 2021-04-29

## 2021-05-02 ENCOUNTER — APPOINTMENT (OUTPATIENT)
Dept: GENERAL RADIOLOGY | Age: 86
DRG: 375 | End: 2021-05-02
Attending: STUDENT IN AN ORGANIZED HEALTH CARE EDUCATION/TRAINING PROGRAM
Payer: MEDICARE

## 2021-05-02 ENCOUNTER — HOSPITAL ENCOUNTER (EMERGENCY)
Dept: CT IMAGING | Age: 86
Discharge: HOME | DRG: 375 | End: 2021-05-02
Attending: STUDENT IN AN ORGANIZED HEALTH CARE EDUCATION/TRAINING PROGRAM
Payer: MEDICARE

## 2021-05-02 ENCOUNTER — APPOINTMENT (OUTPATIENT)
Dept: CT IMAGING | Age: 86
DRG: 375 | End: 2021-05-02
Attending: STUDENT IN AN ORGANIZED HEALTH CARE EDUCATION/TRAINING PROGRAM
Payer: MEDICARE

## 2021-05-02 ENCOUNTER — HOSPITAL ENCOUNTER (INPATIENT)
Age: 86
LOS: 4 days | Discharge: NURSING, REHAB OR LONG-TERM CARE | DRG: 375 | End: 2021-05-06
Attending: STUDENT IN AN ORGANIZED HEALTH CARE EDUCATION/TRAINING PROGRAM | Admitting: FAMILY MEDICINE
Payer: MEDICARE

## 2021-05-02 DIAGNOSIS — S16.1XXA STRAIN OF NECK MUSCLE, INITIAL ENCOUNTER: ICD-10-CM
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
## 2021-05-02 DIAGNOSIS — R91.8 MASS OF RIGHT LUNG: Primary | ICD-10-CM

## 2021-05-02 DIAGNOSIS — R26.9 GAIT ABNORMALITY: ICD-10-CM

## 2021-05-02 DIAGNOSIS — R53.81 DEBILITY: ICD-10-CM

## 2021-05-02 DIAGNOSIS — K63.89 COLONIC MASS: ICD-10-CM

## 2021-05-02 DIAGNOSIS — M25.551 RIGHT HIP PAIN: ICD-10-CM

## 2021-05-02 DIAGNOSIS — R64 CANCER CACHEXIA (HCC): ICD-10-CM

## 2021-05-02 DIAGNOSIS — Z71.89 GOALS OF CARE, COUNSELING/DISCUSSION: ICD-10-CM

## 2021-05-02 LAB
ANION GAP SERPL CALC-SCNC: 10 MMOL/L (ref 5–15)
BASOPHILS # BLD: 0 K/UL (ref 0–0.1)
BASOPHILS NFR BLD: 0 % (ref 0–1)
BUN SERPL-MCNC: 20 MG/DL (ref 6–20)
BUN/CREAT SERPL: 12 (ref 12–20)
CALCIUM SERPL-MCNC: 8.9 MG/DL (ref 8.5–10.1)
CHLORIDE SERPL-SCNC: 109 MMOL/L (ref 97–108)
CO2 SERPL-SCNC: 26 MMOL/L (ref 21–32)
CREAT SERPL-MCNC: 1.69 MG/DL (ref 0.55–1.02)
DIFFERENTIAL METHOD BLD: ABNORMAL
EOSINOPHIL # BLD: 0.1 K/UL (ref 0–0.4)
EOSINOPHIL NFR BLD: 1 % (ref 0–7)
ERYTHROCYTE [DISTWIDTH] IN BLOOD BY AUTOMATED COUNT: 17 % (ref 11.5–14.5)
GLUCOSE SERPL-MCNC: 95 MG/DL (ref 65–100)
HCT VFR BLD AUTO: 25.2 % (ref 35–47)
HGB BLD-MCNC: 7.6 G/DL (ref 11.5–16)
IMM GRANULOCYTES # BLD AUTO: 0 K/UL (ref 0–0.04)
IMM GRANULOCYTES NFR BLD AUTO: 0 % (ref 0–0.5)
LYMPHOCYTES # BLD: 1.3 K/UL (ref 0.8–3.5)
LYMPHOCYTES NFR BLD: 14 % (ref 12–49)
MCH RBC QN AUTO: 23.2 PG (ref 26–34)
MCHC RBC AUTO-ENTMCNC: 30.2 G/DL (ref 30–36.5)
MCV RBC AUTO: 76.8 FL (ref 80–99)
MONOCYTES # BLD: 0.7 K/UL (ref 0–1)
MONOCYTES NFR BLD: 7 % (ref 5–13)
NEUTS SEG # BLD: 7 K/UL (ref 1.8–8)
NEUTS SEG NFR BLD: 77 % (ref 32–75)
NRBC # BLD: 0 K/UL (ref 0–0.01)
NRBC BLD-RTO: 0 PER 100 WBC
PLATELET # BLD AUTO: 437 K/UL (ref 150–400)
PMV BLD AUTO: 10.6 FL (ref 8.9–12.9)
POTASSIUM SERPL-SCNC: 5.2 MMOL/L (ref 3.5–5.1)
RBC # BLD AUTO: 3.28 M/UL (ref 3.8–5.2)
SODIUM SERPL-SCNC: 145 MMOL/L (ref 136–145)
WBC # BLD AUTO: 9.1 K/UL (ref 3.6–11)

## 2021-05-02 PROCEDURE — 73502 X-RAY EXAM HIP UNI 2-3 VIEWS: CPT

## 2021-05-02 PROCEDURE — 70450 CT HEAD/BRAIN W/O DYE: CPT

## 2021-05-02 PROCEDURE — 65270000029 HC RM PRIVATE

## 2021-05-02 PROCEDURE — 73030 X-RAY EXAM OF SHOULDER: CPT

## 2021-05-02 PROCEDURE — 74011000636 HC RX REV CODE- 636: Performed by: RADIOLOGY

## 2021-05-02 PROCEDURE — 85025 COMPLETE CBC W/AUTO DIFF WBC: CPT

## 2021-05-02 PROCEDURE — 80048 BASIC METABOLIC PNL TOTAL CA: CPT

## 2021-05-02 PROCEDURE — 99283 EMERGENCY DEPT VISIT LOW MDM: CPT

## 2021-05-02 PROCEDURE — 72192 CT PELVIS W/O DYE: CPT

## 2021-05-02 PROCEDURE — 36415 COLL VENOUS BLD VENIPUNCTURE: CPT

## 2021-05-02 PROCEDURE — 72125 CT NECK SPINE W/O DYE: CPT

## 2021-05-02 PROCEDURE — 74011250637 HC RX REV CODE- 250/637: Performed by: STUDENT IN AN ORGANIZED HEALTH CARE EDUCATION/TRAINING PROGRAM

## 2021-05-02 RX ORDER — ACETAMINOPHEN 325 MG/1
650 TABLET ORAL
Status: COMPLETED | OUTPATIENT
Start: 2021-05-02 | End: 2021-05-02

## 2021-05-02 RX ORDER — SODIUM CHLORIDE 0.9 % (FLUSH) 0.9 %
5-40 SYRINGE (ML) INJECTION EVERY 8 HOURS
Status: DISCONTINUED | OUTPATIENT
Start: 2021-05-02 | End: 2021-05-03

## 2021-05-02 RX ORDER — PROMETHAZINE HYDROCHLORIDE 25 MG/1
12.5 TABLET ORAL
Status: DISCONTINUED | OUTPATIENT
Start: 2021-05-02 | End: 2021-05-06 | Stop reason: HOSPADM

## 2021-05-02 RX ORDER — LEVOTHYROXINE SODIUM 50 UG/1
50 TABLET ORAL DAILY
Status: DISCONTINUED | OUTPATIENT
Start: 2021-05-03 | End: 2021-05-06 | Stop reason: HOSPADM

## 2021-05-02 RX ORDER — BUMETANIDE 1 MG/1
1 TABLET ORAL AS NEEDED
Status: DISCONTINUED | OUTPATIENT
Start: 2021-05-02 | End: 2021-05-06 | Stop reason: HOSPADM

## 2021-05-02 RX ORDER — DOCUSATE SODIUM 100 MG/1
100 CAPSULE, LIQUID FILLED ORAL 2 TIMES DAILY
Status: DISCONTINUED | OUTPATIENT
Start: 2021-05-03 | End: 2021-05-06 | Stop reason: HOSPADM

## 2021-05-02 RX ORDER — ATORVASTATIN CALCIUM 20 MG/1
40 TABLET, FILM COATED ORAL DAILY
Status: DISCONTINUED | OUTPATIENT
Start: 2021-05-03 | End: 2021-05-03

## 2021-05-02 RX ORDER — TRAMADOL HYDROCHLORIDE 50 MG/1
25 TABLET ORAL
Status: DISCONTINUED | OUTPATIENT
Start: 2021-05-02 | End: 2021-05-03

## 2021-05-02 RX ORDER — PANTOPRAZOLE SODIUM 40 MG/1
40 TABLET, DELAYED RELEASE ORAL
Status: DISCONTINUED | OUTPATIENT
Start: 2021-05-03 | End: 2021-05-06 | Stop reason: HOSPADM

## 2021-05-02 RX ORDER — MIDODRINE HYDROCHLORIDE 5 MG/1
2.5 TABLET ORAL DAILY
Status: DISCONTINUED | OUTPATIENT
Start: 2021-05-03 | End: 2021-05-06 | Stop reason: HOSPADM

## 2021-05-02 RX ORDER — SODIUM CHLORIDE 0.9 % (FLUSH) 0.9 %
5-40 SYRINGE (ML) INJECTION AS NEEDED
Status: DISCONTINUED | OUTPATIENT
Start: 2021-05-02 | End: 2021-05-03

## 2021-05-02 RX ORDER — ACETAMINOPHEN 325 MG/1
650 TABLET ORAL
Status: DISCONTINUED | OUTPATIENT
Start: 2021-05-02 | End: 2021-05-03

## 2021-05-02 RX ORDER — POLYETHYLENE GLYCOL 3350 17 G/17G
17 POWDER, FOR SOLUTION ORAL DAILY PRN
Status: DISCONTINUED | OUTPATIENT
Start: 2021-05-02 | End: 2021-05-03 | Stop reason: SDUPTHER

## 2021-05-02 RX ORDER — POLYETHYLENE GLYCOL 3350 17 G/17G
17 POWDER, FOR SOLUTION ORAL
Status: DISCONTINUED | OUTPATIENT
Start: 2021-05-02 | End: 2021-05-06 | Stop reason: HOSPADM

## 2021-05-02 RX ORDER — ACETAMINOPHEN 650 MG/1
650 SUPPOSITORY RECTAL
Status: DISCONTINUED | OUTPATIENT
Start: 2021-05-02 | End: 2021-05-03

## 2021-05-02 RX ORDER — ONDANSETRON 2 MG/ML
4 INJECTION INTRAMUSCULAR; INTRAVENOUS
Status: DISCONTINUED | OUTPATIENT
Start: 2021-05-02 | End: 2021-05-06 | Stop reason: HOSPADM

## 2021-05-02 RX ORDER — LANOLIN ALCOHOL/MO/W.PET/CERES
325 CREAM (GRAM) TOPICAL 2 TIMES DAILY
Status: DISCONTINUED | OUTPATIENT
Start: 2021-05-03 | End: 2021-05-06 | Stop reason: HOSPADM

## 2021-05-02 RX ADMIN — ACETAMINOPHEN 650 MG: 325 TABLET ORAL at 12:15

## 2021-05-02 RX ADMIN — IOHEXOL 50 ML: 240 INJECTION, SOLUTION INTRATHECAL; INTRAVASCULAR; INTRAVENOUS; ORAL at 22:02

## 2021-05-02 NOTE — DISCHARGE INSTRUCTIONS
[facility] / [address] DISCHARGE INSTRUCTIONS    Aldo Leiva / [id number] : 1925    Admission date: 2021 Discharge date: 2021       Primary care provider: Mayank Gregory MD    Discharging provider:  Shahla Curiel DO  - Family Medicine Resident  Dr. Tato Joe - Attending, Family Medicine   . . . . . . . . . . . . . . . . . . . . . . . . . . . . . . . . . . . . . . . . . . . . . . . . . . . . . . . . . . . . . . . . . . . . . . . . MEDICATION CHANGES  - STOP Lipitor daily  - STOP iron daily  - START lidocaine patch on right shoulder daily as needed  - START tylenol 650mg three times a day    [facility] COURSE:  Aldo Leiva is a 80 y.o. female with a PMHx of  breast cancer, DM2, L arm lymphedema, dementia, OA, hypothyroidism, GERD, orthostatic hypotension, iron deficiency, constipation, HFrEF, HLD who was admitted for progressive debility and dispo and incidentally found to have ascending colon lesion and RUE lesion concerning for malignancy. Patient has decided to pursue comfort care measures only.     Debility: Progressive. Likely 2/2 malignancy of the right lung and colon detected on imaging during recent admission. Possibly 2/2 R hip trauma sustained during GLF a week ago. - Comfort care     Right shoulder/hip pain: S/p trauma sustained during GLF 1 week ago. R shoulder and hip xrays with no acute abnormality.  - Tylenol 650mg TID scheduled   - Lidocaine patch on R shoulder daily prn     Ascending colon mass: Found incidentally on CT pelvis. If neoplasm, it may be the source of the possible met in the lung.   - Comfort care, no further work up      Anemia: BL 7-9. Chronic 2/2 anemia of chronic disease likely 2/2 malignant process. - Discontinue iron 325mg daily d/t comfort      Constipation:   - Continue colace 100mg BID and miralax prn     Dementia: Stable.  No home meds     HFrEF: Last echo 2020 with EF 35-40%  - bumex 1mg as needed for SOB    GERD  - Continue prilosec 20mg daily     DM2: Patient not on any home meds. - Comfort Measures only     Hypothyroidism: Last TSH was 2.45 (wnl) on 9/21/20  - Continue levothyroxine 50mg daily     HLD  - Discontinue lipitor 40mg daily with comfort     Orthostatic hypotension: Recently started on midodrine 2.5 every day  - Continue home midodrine 2.5 every day     L arm lymphedema: Chronic. 2/2 breast cancer s/p resection >10 years ago.     Hyperkalemia: Resolved. Possibly secondary to GI bleed        FOLLOW-UP CARE RECOMMENDATIONS:  Follow-up Information     Follow up With Specialties Details Why Contact Info    Min Batista MD Family Medicine In 2 days  [address] DEPT Emergency Medicine  As needed, If symptoms worsen [facility] [address] [facility]  [phone]    [facility] Dianna Alcazar[address]  [phone]            It is very important that you keep follow-up appointment(s). Bring discharge papers, medication list (and/or medication bottles) to follow-up appointments for review by outpatient provider(s). FOLLOW-UP TESTS RECOMMENDED:   None    ONGOING TREATMENT PLAN: Comfort care      PENDING TEST RESULTS:  At the time of discharge the following test results are still pending: Final Blood cultures. Please review these results as they become available. Specific symptoms to watch for: chest pain, shortness of breath, fever, chills, nausea, vomiting, diarrhea, change in mentation, falling, weakness, bleeding.     DIET:  Regular Diet    ACTIVITY:  Activity as tolerated    WOUND CARE: None needed    EQUIPMENT needed:  ***    INCIDENTAL FINDINGS:  Right lung mass and colon mass    GOALS OF CARE:    Eventual return to home/independent/assisted living     Long term SNF    x  Hospice     No rehospitalization     Patient condition at discharge:   Functional status    Poor    x  Deconditioned      Independent   Cognition  x  Lucid     Forgetful (some sensescence)     Dementia   Catheters/lines (plus indication)    [place]     PICC      PEG         Code status    Full code    x  DNR    . . . . . . . . . . . . . . . . . . . . . . . . . . . . . . . . . . . . . . . . . . . . . . . . . . . . . . . . . . . . . . . . . . . . . . . Elyse Jefferson CHRONIC MEDICAL CONDITIONS:  Problem List as of 5/6/2021 Date Reviewed: 1/18/2021          Codes Class Noted - Resolved    Gait abnormality ICD-10-CM: R26.9  ICD-9-CM: 781.2  5/5/2021 - Present        Cervical strain ICD-10-CM: S16. 1XXA  ICD-9-CM: 847.0  5/5/2021 - Present        Goals of care, counseling/discussion ICD-10-CM: Z71.89  ICD-9-CM: V65.49  5/4/2021 - Present        Mass of right lung ICD-10-CM: R91.8  ICD-9-CM: 786.6  5/3/2021 - Present        Colonic mass ICD-10-CM: K63.89  ICD-9-CM: 569.89  5/3/2021 - Present        Fall ICD-10-CM: W19. [place]  ICD-9-CM: E888.9  5/3/2021 - Present        Right hip pain ICD-10-CM: M25.551  ICD-9-CM: 719.45  5/3/2021 - Present        Debility ICD-10-CM: R53.81  ICD-9-CM: 799.3  5/2/2021 - Present        Paroxysmal atrial fibrillation (HCC) ICD-10-CM: I48.0  ICD-9-CM: 427.31  9/23/2020 - Present        Atrial fibrillation ([address] ICD-10-CM: I48.91  ICD-9-CM: 427.31  9/23/2020 - Present        CHF (congestive heart failure) (HCC) ICD-10-CM: I50.9  ICD-9-CM: 428.0  9/22/2020 - Present        GI bleed ICD-10-CM: K92.2  ICD-9-CM: 578.9  9/21/2020 - Present        Symptomatic anemia ICD-10-CM: D64.9  ICD-9-CM: 285.9  9/21/2020 - Present        History of transient ischemic attack (TIA) ICD-10-CM: Z86.73  ICD-9-CM: V12.54  5/15/2019 - Present        Dysphasia ICD-10-CM: R47.02  ICD-9-CM: 784.59  5/15/2019 - Present        Hypokalemia ICD-10-CM: E87.6  ICD-9-CM: 276.8  5/15/2019 - Present        Elevated serum creatinine ICD-10-CM: R79.89  ICD-9-CM: 790.99  5/15/2019 - Present Stage 3 chronic kidney disease (HCC) ICD-10-CM: N18.30  ICD-9-CM: 585.3  5/15/2019 - Present        Right facial numbness ICD-10-CM: R20.0  ICD-9-CM: 782.0  5/15/2019 - Present        History of CVA (cerebrovascular accident) ICD-10-CM: Z86.73  ICD-9-CM: V12.54  5/15/2019 - Present        Orthostatic hypotension ICD-10-CM: I95.1  ICD-9-CM: 458.0  10/2/2018 - Present        Acquired hypothyroidism ICD-10-CM: E03.9  ICD-9-CM: 244.9  9/28/2018 - Present        Anemia ICD-10-CM: D64.9  ICD-9-CM: 285.9  9/28/2018 - Present        Atopic dermatitis ICD-10-CM: L20.9  ICD-9-CM: 691.8  9/28/2018 - Present        Callus ICD-10-CM: L84  ICD-9-CM: 700  9/28/2018 - Present        Dementia ([facility] [address] ICD-10-CM: F03.90  ICD-9-CM: 294.20  9/28/2018 - Present        Type II diabetes mellitus ([facility] [address] ICD-10-CM: E11.9  ICD-9-CM: 250.00  9/28/2018 - Present        Diabetic neuropathy with neurologic complication (HCC) [id number]: E11.40, E11.49  ICD-9-CM: 250.60, 357.2  9/28/2018 - Present        Essential hypertension ICD-10-CM: I10  ICD-9-CM: 401.9  9/28/2018 - Present        History of breast cancer ICD-10-CM: Z85.3  ICD-9-CM: V10.3  9/28/2018 - Present        Hypercholesterolemia ICD-10-CM: E78.00  ICD-9-CM: 272.0  9/28/2018 - Present        Lymphedema ICD-10-CM: I89.0  ICD-9-CM: 457.1  9/28/2018 - Present        Memory loss ICD-10-CM: R41.3  ICD-9-CM: 780.93  9/28/2018 - Present        Osteopenia ICD-10-CM: M85.80  ICD-9-CM: 733.90  9/28/2018 - Present        RESOLVED: Headache ICD-10-CM: R51.9  ICD-9-CM: 784.0  5/15/2019 - 9/21/2020        RESOLVED: Abnormal urinalysis ICD-10-CM: R82.90  ICD-9-CM: 791.9  5/15/2019 - 9/21/2020        RESOLVED: UTI (urinary tract infection) ICD-10-CM: N39.0  ICD-9-CM: 599.0  5/15/2019 - 9/21/2020              Information obtained by :   I understand that if any problems occur once I am at home I am to contact my physician. I understand and acknowledge receipt of the instructions indicated above. Physician's or R.N.'s Signature                                                                  Date/Time                                                                                                                                              Patient or Representative Signature                                                          Date/Time

## 2021-05-02 NOTE — ED PROVIDER NOTES
The patient is a 54-year-old female history of breast cancer on the left side with associated lymphedema, hyperlipidemia, hypothyroidism, dementia and diabetes presenting today secondary to right hip pain. She fell about a week ago after a slip and fall. She was seen somewhere and had x-rays but uncertain where or what was x-rayed. Today she called family because the right hip was bothering her more than usual.  She is also complaining of some neck pain. She is unsure if they imaged her neck the other day. She also reports a little bit of a headache is worse with movement of the head. She was unable to bear weight on the right leg at home today. She did not take anything for this hip pain. On further questioning it seems like this is acute on chronic right-sided hip pain. She denies any new fall today. Denies any chest discomfort or shortness of breath. No abdominal discomfort. No upper extremity discomfort.            Past Medical History:   Diagnosis Date    Abnormal urinalysis 5/15/2019    Anemia     Breast cancer ([facility] [address]     Cancer (HCC)     left breast    Cataract     Chronic renal failure     Colonic polyp     Dementia (HCC)     Diabetes (HCC)     Eczema     Headache     Hyperlipemia     Hypothyroidism     Ill-defined condition     high cholesterol, hypothyroid    Osteopenia     Posterior vitreous detachment, left     Type 2 diabetes mellitus (HCC)     UTI (urinary tract infection)     Vitamin D deficiency        Past Surgical History:   Procedure Laterality Date    HX APPENDECTOMY      HX CATARACT REMOVAL      HX COLONOSCOPY  2009    HX GYN      hysterectomy    HX MASTECTOMY  1992    S/P Left breast ,edial segmental mastectomy- 1992;    HX ORTHOPAEDIC      back    HI BREAST SURGERY PROCEDURE UNLISTED      left breast lumpectomy         Family History:   Problem Relation Age of Onset    No Known Problems Mother     No Known Problems Father        Social History Socioeconomic History    Marital status:      Spouse name: Not on file    Number of children: Not on file    Years of education: Not on file    Highest education level: Not on file   Occupational History    Not on file   Social Needs    Financial resource strain: Not on file    Food insecurity     Worry: Not on file     Inability: Not on file    Transportation needs     Medical: Not on file     Non-medical: Not on file   Tobacco Use    Smoking status: Never Smoker    Smokeless tobacco: Never Used   Substance and Sexual Activity    Alcohol use: No    Drug use: No    Sexual activity: Not Currently   Lifestyle    Physical activity     Days per week: Not on file     Minutes per session: Not on file    Stress: Not on file   Relationships    Social connections     Talks on phone: Not on file     Gets together: Not on file     Attends [employer] service: Not on file     Active member of club or organization: Not on file     Attends meetings of clubs or organizations: Not on file     Relationship status: Not on file    Intimate partner violence     Fear of current or ex partner: Not on file     Emotionally abused: Not on file     Physically abused: Not on file     Forced sexual activity: Not on file   Other Topics Concern    Not on file   Social History Narrative    ** Merged History Encounter **              ALLERGIES: Patient has no known allergies. Review of Systems   Constitutional: Negative for chills and fever. HENT: Negative for congestion and rhinorrhea. Eyes: Negative for redness and visual disturbance. Respiratory: Negative for cough and shortness of breath. Cardiovascular: Negative for chest pain and leg swelling. Gastrointestinal: Negative for abdominal pain, diarrhea, nausea and vomiting. Genitourinary: Negative for dysuria, flank pain, frequency, hematuria and urgency. Musculoskeletal: Positive for arthralgias and neck pain. Negative for back pain and myalgias. Skin: Negative for rash and wound. Allergic/Immunologic: Negative for immunocompromised state. Neurological: Positive for headaches. Negative for dizziness. Vitals:    05/02/21 1206   BP: (!) 123/52   Pulse: 67   Resp: 16   Temp: 96.9 °F (36.1 °C)   SpO2: 99%            Physical Exam  Vitals signs and nursing note reviewed. Constitutional:       General: She is not in acute distress. Appearance: She is well-developed. She is not diaphoretic. HENT:      Head: Normocephalic. Comments: No signs of head trauma     Mouth/Throat:      Pharynx: No oropharyngeal exudate. Eyes:      General:         Right eye: No discharge. Left eye: No discharge. Pupils: Pupils are equal, round, and reactive to light. Neck:      Musculoskeletal: Normal range of motion and neck supple. Cardiovascular:      Rate and Rhythm: Normal rate and regular rhythm. Heart sounds: Normal heart sounds. No murmur. No friction rub. No gallop. Pulmonary:      Effort: Pulmonary effort is normal. No respiratory distress. Breath sounds: Normal breath sounds. No stridor. No wheezing or rales. Abdominal:      General: Bowel sounds are normal. There is no distension. Palpations: Abdomen is soft. Tenderness: There is no abdominal tenderness. There is no guarding or rebound. Musculoskeletal: Normal range of motion. General: No deformity. Comments: R paraspinal C spine tenderness  No T/L spine tenderness  No chest wall tenderness, no crepitus, no flail segment  Upper and lower extremities fully ranged, visualized, and palpated without tenderness or deformity. No snuff box tenderness or pain with axial loading of the thumb. Tenderness to R lateral hip without obvious deformity  Equal dp and pt pulses  Pelvis is stable       Skin:     General: Skin is warm and dry. Capillary Refill: Capillary refill takes less than 2 seconds. Findings: No rash.    Neurological:      Mental Status: She is alert and oriented to person, place, and time. Psychiatric:         Behavior: Behavior normal.          MDM  31-year-old female presenting today after a fall a week ago with worsening right hip pain. She is also noting some headache and right neck pain so imaging of these things were also imaged. Imaging overall negative. I was actually ready to discharge the patient however patient and family very concerned about her going home as she is unable to really get out of bed and walk. There is concerned she will fall. She does have home health but not 24/7. Patient attempted to ambulate and is extremely unsteady on her feet. She has been to physical therapy in the past and family is wanting to get this set up again. Due to family's concerns patient will be admitted to the hospital.        ICD-10-CM ICD-9-CM    1. Mass of right lung  R91.8 786.6    2. Fall, initial encounter  [address] XXXA E888.9    3. Right hip pain  M25.551 719.45    4. Strain of neck muscle, initial encounter  S16. 1XXA 847.0    5.  Gait abnormality  R26.9 781.2      DC    Jose Rafael Phillips,

## 2021-05-02 NOTE — ED TRIAGE NOTES
Patient brought to the ED room 12, via w/c, with a family member. Is able to stand to ambulate and get in the bed. \"I fell last week\" and family says that she has had [facility] via homehealth, to a facility, and there were \"no broken bones. \" Today c/o neck pain and right hip pain, to the touch. Has very good ROM of the right leg and hip.

## 2021-05-02 NOTE — PROGRESS NOTES
[address]  Per report Ciarra Sender RN reports patient is a difficult stick and despite multiple attempts was not able to get IV access. RN reports MD is aware and patient is not here for fluids but for PT/OT and rehab.

## 2021-05-02 NOTE — PROGRESS NOTES
56  Spoke with family practice about patient's arrival. Pt requesting food. Dysphagia screening passed. MDstated ok for regular diet.

## 2021-05-02 NOTE — ED NOTES
Not able to get an IV started but was able to draw her blood pt states that she does not need that and does not want to be stuck, informed Dr Ilya Rojas he is fine with pt not having an IV at this time

## 2021-05-02 NOTE — ED NOTES
TRANSFER - OUT REPORT:    Verbal report given to Rayomnd Lopez RN(name) on Aldo Leiva  being transferred to [place] [id number](unit) for routine progression of care       Report consisted of patients Situation, Background, Assessment and   Recommendations(SBAR). Information from the following report(s) ED Summary and Recent Results was reviewed with the receiving nurse. Lines:       Opportunity for questions and clarification was provided.       Patient transported with:   Monitor

## 2021-05-03 ENCOUNTER — APPOINTMENT (OUTPATIENT)
Dept: CT IMAGING | Age: 86
DRG: 375 | End: 2021-05-03
Attending: FAMILY MEDICINE
Payer: MEDICARE

## 2021-05-03 PROBLEM — M25.551 RIGHT HIP PAIN: Status: ACTIVE | Noted: 2021-05-03

## 2021-05-03 PROBLEM — K63.89 COLONIC MASS: Status: ACTIVE | Noted: 2021-05-03
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
## 2021-05-03 PROBLEM — R91.8 MASS OF RIGHT LUNG: Status: ACTIVE | Noted: 2021-05-03

## 2021-05-03 LAB
ABO + RH BLD: NORMAL
ANION GAP SERPL CALC-SCNC: 3 MMOL/L (ref 5–15)
ATRIAL RATE: 68 BPM
BLOOD GROUP ANTIBODIES SERPL: NORMAL
BUN SERPL-MCNC: 19 MG/DL (ref 6–20)
BUN/CREAT SERPL: 13 (ref 12–20)
CALCIUM SERPL-MCNC: 8.2 MG/DL (ref 8.5–10.1)
CALCULATED P AXIS, ECG09: 69 DEGREES
CALCULATED R AXIS, ECG10: 39 DEGREES
CALCULATED T AXIS, ECG11: 104 DEGREES
CHLORIDE SERPL-SCNC: 110 MMOL/L (ref 97–108)
CO2 SERPL-SCNC: 27 MMOL/L (ref 21–32)
CREAT SERPL-MCNC: 1.45 MG/DL (ref 0.55–1.02)
DIAGNOSIS, 93000: NORMAL
ERYTHROCYTE [DISTWIDTH] IN BLOOD BY AUTOMATED COUNT: 16.8 % (ref 11.5–14.5)
GLUCOSE BLD STRIP.AUTO-MCNC: 108 MG/DL (ref 65–100)
GLUCOSE BLD STRIP.AUTO-MCNC: 130 MG/DL (ref 65–100)
GLUCOSE BLD STRIP.AUTO-MCNC: 176 MG/DL (ref 65–100)
GLUCOSE SERPL-MCNC: 94 MG/DL (ref 65–100)
HCT VFR BLD AUTO: 24.5 % (ref 35–47)
HGB BLD-MCNC: 7.3 G/DL (ref 11.5–16)
MCH RBC QN AUTO: 23.3 PG (ref 26–34)
MCHC RBC AUTO-ENTMCNC: 29.8 G/DL (ref 30–36.5)
MCV RBC AUTO: 78.3 FL (ref 80–99)
NRBC # BLD: 0 K/UL (ref 0–0.01)
NRBC BLD-RTO: 0 PER 100 WBC
P-R INTERVAL, ECG05: 158 MS
PLATELET # BLD AUTO: 371 K/UL (ref 150–400)
PMV BLD AUTO: 10.1 FL (ref 8.9–12.9)
POTASSIUM SERPL-SCNC: 4.6 MMOL/L (ref 3.5–5.1)
Q-T INTERVAL, ECG07: 390 MS
QRS DURATION, ECG06: 76 MS
QTC CALCULATION (BEZET), ECG08: 414 MS
RBC # BLD AUTO: 3.13 M/UL (ref 3.8–5.2)
SARS-COV-2, COV2: NORMAL
SERVICE CMNT-IMP: ABNORMAL
SODIUM SERPL-SCNC: 140 MMOL/L (ref 136–145)
SPECIMEN EXP DATE BLD: NORMAL
VENTRICULAR RATE, ECG03: 68 BPM
WBC # BLD AUTO: 8.5 K/UL (ref 3.6–11)

## 2021-05-03 PROCEDURE — 97161 PT EVAL LOW COMPLEX 20 MIN: CPT

## 2021-05-03 PROCEDURE — 93005 ELECTROCARDIOGRAM TRACING: CPT

## 2021-05-03 PROCEDURE — 74011250637 HC RX REV CODE- 250/637: Performed by: STUDENT IN AN ORGANIZED HEALTH CARE EDUCATION/TRAINING PROGRAM

## 2021-05-03 PROCEDURE — 97165 OT EVAL LOW COMPLEX 30 MIN: CPT

## 2021-05-03 PROCEDURE — 97535 SELF CARE MNGMENT TRAINING: CPT

## 2021-05-03 PROCEDURE — 74011250636 HC RX REV CODE- 250/636: Performed by: STUDENT IN AN ORGANIZED HEALTH CARE EDUCATION/TRAINING PROGRAM

## 2021-05-03 PROCEDURE — U0005 INFEC AGEN DETEC AMPLI PROBE: HCPCS

## 2021-05-03 PROCEDURE — 94760 N-INVAS EAR/PLS OXIMETRY 1: CPT

## 2021-05-03 PROCEDURE — 80048 BASIC METABOLIC PNL TOTAL CA: CPT

## 2021-05-03 PROCEDURE — 97530 THERAPEUTIC ACTIVITIES: CPT

## 2021-05-03 PROCEDURE — 71250 CT THORAX DX C-: CPT

## 2021-05-03 PROCEDURE — 87040 BLOOD CULTURE FOR BACTERIA: CPT

## 2021-05-03 PROCEDURE — 82962 GLUCOSE BLOOD TEST: CPT

## 2021-05-03 PROCEDURE — 74176 CT ABD & PELVIS W/O CONTRAST: CPT

## 2021-05-03 PROCEDURE — 65270000029 HC RM PRIVATE

## 2021-05-03 PROCEDURE — 85027 COMPLETE CBC AUTOMATED: CPT

## 2021-05-03 PROCEDURE — 99222 1ST HOSP IP/OBS MODERATE 55: CPT | Performed by: FAMILY MEDICINE

## 2021-05-03 PROCEDURE — 74011250637 HC RX REV CODE- 250/637: Performed by: INTERNAL MEDICINE

## 2021-05-03 PROCEDURE — 86901 BLOOD TYPING SEROLOGIC RH(D): CPT

## 2021-05-03 PROCEDURE — 99223 1ST HOSP IP/OBS HIGH 75: CPT | Performed by: INTERNAL MEDICINE

## 2021-05-03 PROCEDURE — 36415 COLL VENOUS BLD VENIPUNCTURE: CPT

## 2021-05-03 PROCEDURE — 77030005513 HC CATH URETH FOL11 MDII -B

## 2021-05-03 RX ORDER — INSULIN LISPRO 100 [IU]/ML
INJECTION, SOLUTION INTRAVENOUS; SUBCUTANEOUS
Status: DISCONTINUED | OUTPATIENT
Start: 2021-05-03 | End: 2021-05-03

## 2021-05-03 RX ORDER — ENOXAPARIN SODIUM 100 MG/ML
30 INJECTION SUBCUTANEOUS EVERY 24 HOURS
Status: DISCONTINUED | OUTPATIENT
Start: 2021-05-04 | End: 2021-05-03

## 2021-05-03 RX ORDER — MAGNESIUM SULFATE 100 %
4 CRYSTALS MISCELLANEOUS AS NEEDED
Status: DISCONTINUED | OUTPATIENT
Start: 2021-05-03 | End: 2021-05-03

## 2021-05-03 RX ORDER — ENOXAPARIN SODIUM 100 MG/ML
40 INJECTION SUBCUTANEOUS EVERY 24 HOURS
Status: DISCONTINUED | OUTPATIENT
Start: 2021-05-03 | End: 2021-05-03

## 2021-05-03 RX ORDER — DEXTROSE 50 % IN WATER (D50W) INTRAVENOUS SYRINGE
12.5-25 AS NEEDED
Status: DISCONTINUED | OUTPATIENT
Start: 2021-05-03 | End: 2021-05-03

## 2021-05-03 RX ORDER — ACETAMINOPHEN 325 MG/1
650 TABLET ORAL 3 TIMES DAILY
Status: DISCONTINUED | OUTPATIENT
Start: 2021-05-03 | End: 2021-05-06 | Stop reason: HOSPADM

## 2021-05-03 RX ADMIN — PANTOPRAZOLE SODIUM 40 MG: 40 TABLET, DELAYED RELEASE ORAL at 06:29

## 2021-05-03 RX ADMIN — DOCUSATE SODIUM 100 MG: 100 CAPSULE, LIQUID FILLED ORAL at 17:15

## 2021-05-03 RX ADMIN — MIDODRINE HYDROCHLORIDE 2.5 MG: 5 TABLET ORAL at 08:08

## 2021-05-03 RX ADMIN — LEVOTHYROXINE SODIUM 50 MCG: 0.05 TABLET ORAL at 08:08

## 2021-05-03 RX ADMIN — FERROUS SULFATE TAB 325 MG (65 MG ELEMENTAL FE) 325 MG: 325 (65 FE) TAB at 22:08

## 2021-05-03 RX ADMIN — DOCUSATE SODIUM 100 MG: 100 CAPSULE, LIQUID FILLED ORAL at 08:08

## 2021-05-03 RX ADMIN — ACETAMINOPHEN 650 MG: 325 TABLET ORAL at 14:44

## 2021-05-03 RX ADMIN — ATORVASTATIN CALCIUM 40 MG: 20 TABLET, FILM COATED ORAL at 08:08

## 2021-05-03 RX ADMIN — ENOXAPARIN SODIUM 40 MG: 40 INJECTION SUBCUTANEOUS at 08:08

## 2021-05-03 RX ADMIN — ACETAMINOPHEN 650 MG: 325 TABLET ORAL at 22:08

## 2021-05-03 RX ADMIN — FERROUS SULFATE TAB 325 MG (65 MG ELEMENTAL FE) 325 MG: 325 (65 FE) TAB at 08:08

## 2021-05-03 RX ADMIN — FERROUS SULFATE TAB 325 MG (65 MG ELEMENTAL FE) 325 MG: 325 (65 FE) TAB at 00:54

## 2021-05-03 NOTE — H&P
Clay Pollock Caridad Santiago [address]  Office [phone] Fax [phone] Admission H&P Name: Milena Norris MRN: [id number]  Sex: Female YOB: 1925  Age: 80 y.o. PCP: Popeye Hubbard MD  
 
Source of Information: patient, medical records Chief complaint: progressive debility History of Present Illness: History provided by patients niece and patient Milena Norris is a 80 y.o. female with PMHx of breast cancer, DM2, L arm lymphedema, dementia, OA, hypothyroidism, GERD, orthostatic hypotension, iron deficiency, constipation, HFrEF, HLD who presents to the ED complaining of progressive difficulty walking after sustaining and GLF last week. Patient has sustained 3 GLFs within the last 2 months with the last one being 7 days ago. At the time of fall patient was reportedly taken to an urgent care with imaging done which identified no acute abnormalities. During the last fall the patient hit her head, right shoulder and right hip. Since then she has been progressively having more trouble walking and she is not able to put weight on her RLE stating that her right hip hurts. Usually she is able to ambulate with a walker or a cane without any assistance. Patient does have home health but not 24/7. Due to worsening status of the patient she was brought into the ED. Patient denies CP, SOB, abd, N, V. Patient denies hip pain at rest with no pressure placed on hip. Patient endorses right shoulder pain. COVID Questions:  
Experiencing any of the following symptoms: fever, chills, cough, SOB, diarrhea, URI symptoms. no 
Any Sick contacts with fever, cough, diarrhea, SOB, URI symptoms. no 
Traveled out of state or out of country. no 
Lives alone. Has been staying at home. yes In the ED: 
Vitals: Temp 97.8   HR 72   /55   RR 16   SatO2  98% on RA Labs: HGB 7.6, , K 5.2, Ca 8.6 Imaging: R hip XR no acute findings, CT head no acute findings, CT C spine Spiculated 2.4 cm 
mass in the right upper lobe. , CT pelv Ascending colitis versus neoplasm Treatment: tylenol 650 EKG: Not performed Patient Vitals for the past 12 hrs: 
 Temp Pulse Resp BP SpO2  
05/02/21 1946 97.8 °F (36.6 °C) 72 16 (!) 133/55 98 % 05/02/21 1753 97.3 °F (36.3 °C) 67 16 (!) 174/66 100 % 05/02/21 1751   18    
05/02/21 1206 96.9 °F (36.1 °C) 67 16 (!) 123/52 99 % Review of Systems Review of Systems Constitutional: Positive for activity change. Negative for appetite change, chills and fever. HENT: Negative for hearing loss. Eyes: Negative for visual disturbance. Respiratory: Negative for cough, choking, chest tightness, shortness of breath, wheezing and stridor. Cardiovascular: Negative for chest pain, palpitations and leg swelling. Gastrointestinal: Negative for abdominal pain, nausea and vomiting. Genitourinary: Negative for dysuria and pelvic pain. Musculoskeletal: Positive for arthralgias. Neurological: Negative for dizziness, syncope and light-headedness. Psychiatric/Behavioral: Negative for agitation and confusion. Home Medications Prior to Admission medications Medication Sig Start Date End Date Taking? Authorizing Provider  
midodrine (PROAMATINE) 2.5 mg tablet Take 1 Tab by mouth daily for 30 days. 4/26/21 5/26/21 Yes Chun Leong MD  
docusate sodium (COLACE) 100 mg capsule Take 1 Cap by mouth two (2) times a day. 4/13/21  Yes Chun Leong MD  
atorvastatin (LIPITOR) 40 mg tablet Take 1 tablet by mouth once daily 2/12/21  Yes Chun Leong MD  
omeprazole (PRILOSEC) 20 mg capsule TAKE 1 CAPSULE BY MOUTH EVERY DAY 1/31/21  Yes Chun Leong MD  
acetaminophen (TYLENOL) 500 mg tablet Take 500 mg by mouth every six (6) hours as needed for Pain. Yes Provider, Historical  
ferrous sulfate 325 mg (65 mg iron) tablet Take 1 Tab by mouth two (2) times a day.  8/31/20  Yes Chun Leong MD  
levothyroxine (SYNTHROID) 50 mcg tablet Take 1 tablet by mouth once daily 8/25/20  Yes Ledy Galdamez MD  
bumetanide (BUMEX) 1 mg tablet Take 1 Tab by mouth as needed (swelling or shortness of breath). 10/15/20   Ledy Galdamez MD  
polyethylene glycol (MIRALAX) 17 gram packet Take 1 Packet by mouth daily. Patient taking differently: Take 17 g by mouth two (2) times daily as needed. 8/31/20   Ledy Galdamez MD  
 
 
Allergies No Known Allergies Past Medical History Past Medical History:  
Diagnosis Date  Abnormal urinalysis 5/15/2019  Anemia  Breast cancer ([facility] [address]  Cancer [facility])   
 left breast  
 Cataract  Chronic renal failure  Colonic polyp  Dementia ([facility] [address]  Diabetes ([facility] [address]  Eczema  Headache  Hyperlipemia  Hypothyroidism  Ill-defined condition   
 high cholesterol, hypothyroid  Osteopenia  Posterior vitreous detachment, left  Type 2 diabetes mellitus ([facility] [address]  UTI (urinary tract infection)  Vitamin D deficiency Previous Hospitalization(s) Past Surgical History:  
Procedure Laterality Date  HX APPENDECTOMY  HX CATARACT REMOVAL    
 HX COLONOSCOPY  2009  HX GYN    
 hysterectomy [address] [facility] Left breast ,edial segmental mastectomy- 1992;  
 HX ORTHOPAEDIC    
 back  LA BREAST SURGERY PROCEDURE UNLISTED    
 left breast lumpectomy Family History Family History Problem Relation Age of Onset  No Known Problems Mother  No Known Problems Father Social History Alcohol history: Not at all Smoking history: Non-smoker Illicit drug history: Not at all Living arrangement: patient lives alone Ambulates: Assisted walker Vital Signs Visit Vitals BP (!) 133/55 (BP 1 Location: Right arm, BP Patient Position: At rest) Pulse 72 Temp 97.8 °F (36.6 °C) Resp 16 SpO2 98% Physical Exam 
General: No acute distress. Alert. Cooperative. Head: Normocephalic. Atraumatic.   
Eyes: Conjunctiva pink. Sclera white. EOMI. Ears:              Hearing impaired bilaterally Nose:             Septum midline. Mucosa pink. No drainage. Throat: Mucosa pink. Moist mucous membranes. No tonsillar exudates or erythema. Palate movement equal bilaterally. Neck: Supple. Normal ROM. No stiffness. Respiratory: CTAB. No w/r/r/c.  
Cardiovascular: RRR. Normal S1,S2. No m/r/g. GI: + bowel sounds. Nontender. No rebound tenderness or guarding. Nondistended. Extremities: No LE edema. Distal pulses intact. LUE nonpitting edema present. This is patients baseline. No swelling or erythema on right hip. Right hip TTP Musculoskeletal: Full ROM in all extremities. Skin: Warm, dry. No rashes. Neuro: No focal deficits noted. No sensory deficits in RLE Laboratory Data Recent Results (from the past 8 hour(s)) CBC WITH AUTOMATED DIFF Collection Time: 05/02/21  4:45 PM  
Result Value Ref Range WBC 9.1 3.6 - 11.0 K/uL  
 RBC 3.28 (L) 3.80 - 5.20 M/uL HGB 7.6 (L) 11.5 - 16.0 g/dL HCT 25.2 (L) 35.0 - 47.0 % MCV 76.8 (L) 80.0 - 99.0 FL  
 MCH 23.2 (L) 26.0 - 34.0 PG  
 MCHC 30.2 30.0 - 36.5 g/dL  
 RDW 17.0 (H) 11.5 - 14.5 % PLATELET 287 (H) 653 - 400 K/uL MPV 10.6 8.9 - 12.9 FL  
 NRBC 0.0 0.0  WBC ABSOLUTE NRBC 0.00 0.00 - 0.01 K/uL NEUTROPHILS 77 (H) 32 - 75 % LYMPHOCYTES 14 12 - 49 % MONOCYTES 7 5 - 13 % EOSINOPHILS 1 0 - 7 % BASOPHILS 0 0 - 1 % IMMATURE GRANULOCYTES 0 0 - 0.5 % ABS. NEUTROPHILS 7.0 1.8 - 8.0 K/UL  
 ABS. LYMPHOCYTES 1.3 0.8 - 3.5 K/UL  
 ABS. MONOCYTES 0.7 0.0 - 1.0 K/UL  
 ABS. EOSINOPHILS 0.1 0.0 - 0.4 K/UL  
 ABS. BASOPHILS 0.0 0.0 - 0.1 K/UL  
 ABS. IMM. GRANS. 0.0 0.00 - 0.04 K/UL  
 DF AUTOMATED METABOLIC PANEL, BASIC Collection Time: 05/02/21  4:45 PM  
Result Value Ref Range Sodium 145 136 - 145 mmol/L Potassium 5.2 (H) 3.5 - 5.1 mmol/L Chloride 109 (H) 97 - 108 mmol/L  
 CO2 26 21 - 32 mmol/L  Anion gap 10 5 - 15 mmol/L Glucose 95 65 - 100 mg/dL BUN 20 6 - 20 MG/DL Creatinine 1.69 (H) 0.55 - 1.02 MG/DL  
 BUN/Creatinine ratio 12 12 - 20 GFR est AA 34 (L) >60 ml/min/1.73m2 GFR est non-AA 28 (L) >60 ml/min/1.73m2 Calcium 8.9 8.5 - 10.1 MG/DL Imaging CXR Results  (Last 48 hours) None CT Results  (Last 48 hours) 05/02/21 1615  CT PELV WO CONT Final result Impression: 1. No fracture. 2. Ascending colitis versus neoplasm is partially imaged. Correlate with CBC. Recommend CT abdomen/pelvis with oral and IV contrast when patient can tolerate. Narrative:  *PRELIMINARY REPORT* CT of the pelvis demonstrates no fracture. Preliminary report was provided by Dr. Dinesh So, the on-call radiologist, at  
4:46 PM  
   
Final report to follow. *END PRELIMINARY REPORT* *FINAL REPORT BELOW* EXAM: CT PELV WO CONT INDICATION: Right hip pain after fall, difficulty walking. COMPARISON: CT pelvis on 9/17/2020 TECHNIQUE: Helical CT of the bony pelvis with coronal and sagittal reformats. Images reviewed in soft tissue and bone windows. CT dose reduction was achieved  
through the use of a standardized protocol tailored for this examination and  
automatic exposure control for dose modulation. CONTRAST: None. FINDINGS: Bones: Bones are osteopenic. No acute fracture. No aggressive bone  
lesion. Joint fluid: None. Articulations: Mild bilateral hip osteoarthritis. Lumbar spine degenerative  
disease is unchanged. Tendons: Bilateral hamstring origin calcific tendinosis. Muscles: No intramuscular hematoma. No focal atrophy. Soft tissue mass: None. Mural thickening of the proximal ascending colon is  
partially imaged. Edema is in the fat posterior to the ascending colon. 05/02/21 1305  CT HEAD WO CONT Final result Impression:  No acute findings. Narrative:  EXAM: CT HEAD WO CONT INDICATION: fall COMPARISON: August 6. CONTRAST: None. TECHNIQUE: Unenhanced CT of the head was performed using 5 mm images. Brain and  
bone windows were generated. Coronal and sagittal reformats. CT dose reduction  
was achieved through use of a standardized protocol tailored for this  
examination and automatic exposure control for dose modulation. FINDINGS:  
The ventricles and sulci are enlarged. There are bilateral basal ganglia  
calcifications. There are vertebral artery calcifications There is no  
significant white matter disease. There is no intracranial hemorrhage,  
extra-axial collection, or mass effect. The basilar cisterns are open. No CT  
evidence of acute infarct. The bone windows demonstrate no abnormalities. The visualized portions of the  
paranasal sinuses and mastoid air cells are clear. 05/02/21 1305  CT SPINE CERV WO CONT Final result Impression: 1. Multilevel degenerative changes without fracture. 2. Right upper lobe mass. CT of the thorax recommended. Narrative:  EXAM:  CT CERVICAL SPINE WITHOUT CONTRAST INDICATION: fall w neck pain. COMPARISON: August 6, 2020 CONTRAST:  None. TECHNIQUE: Multislice helical CT of the cervical spine was performed without  
intravenous contrast administration. Sagittal and coronal reformats were  
generated. CT dose reduction was achieved through use of a standardized  
protocol tailored for this examination and automatic exposure control for dose  
modulation. FINDINGS:  
   
The alignment is within normal limits. There is no fracture or compression  
deformity. The odontoid process is intact. The craniocervical junction is within  
normal limits. Calcification of the atlantoaxial ligament. Spiculated 2.4 cm  
mass in the right upper lobe. The incidentally imaged soft tissues are within normal limits.   
   
C2-C3: Bilateral facet arthropathy. Disc space narrowing. Significant right  
neural foraminal narrowing. C3-C4: There is no spinal canal or neural foraminal stenosis. C4-C5: Bilateral facet arthropathy. C5-C6: Disc space narrowing. Uncinate process hypertrophy with left neural  
foraminal narrowing. C6-C7: There is no spinal canal or neural foraminal stenosis. C7-T1: There is no spinal canal or neural foraminal stenosis. Assessment and Plan Maggie Romo is a 80 y.o. female with a PMHx of  breast cancer, DM2, L arm lymphedema, dementia, OA, hypothyroidism, GERD, orthostatic hypotension, iron deficiency, constipation, HFrEF, HLD who is admitted for progressive debility and dispo. Debility: Progressive. May be 2/2 malignancy found on imaging in right lung and colon. May be also be 2/2 R hip trauma sustained during GLF a week ago; though the malignancy and associated anemia may have caused the fall also. - Admit to medical 
- VS per unit routine - Patient passed SLP, regular diet 
- PT/OT/CM consulted for dispo Right shoulder pain: S/p trauma sustained during GLF 1 week ago - R shoulder xray Ascending colitis vs neoplasm: Found incidentally on CT pelvis. If neoplasm, it may be the source of the possible met in the lung as well. Will need to cover for colitis. Colitis with associated IVVD could have caused GLF in this patient - Zosyn for intraabdominal infection - BCx to be collected before antibiotics started - Pan scan per radiology recs to look for further mets Anemia: HGB 7.6. (at baseline). Chronic. Likely anemia of chronic disease 2/2 malignant process. Patient is not a good candidate for transfusion due to her life expectancy given her age and likely malignancy 
- Daily CBC 
- Type and screen  
- Continue home iron 325 every day - SCDs for DVT ppx (patient also has h/o GIB) Constipation: At home on colace 100 BID and miralax PRN 
- Continue home meds Dementia: Stable. No home meds GERD: At home on prilosec 20 every day - Substitute for protonix 40 per formulary DM2: Patient not on any home meds. - [facility] glucose checks - Hypoglycemia protocols Hypothyroidism: Patient at home on levothyroxine 50 every day. Last TSH was 2.45 (wnl) on 9/21/20 
- Continue home levothyroxine 50 HLD: At home on lipitor 40 
- Continue home lipitor 40 
- Consider discontinuing in outpatient setting as patient is 80years old with life expectancy <10 years Orthostatic hypotension: Recently started on midodrine 2.5 every day 
- Continue home midodrine 2.5 every day L arm lymphedema: Chronic. 2/2 breast cancer s/p resection >10 years ago. Hyperkalemia: 5.2 POA. Possibly secondary to GI bleed - BMP daily HFrEF: Last echo 9/2020 with EF 35-40%. Patient on bumex 1 PRN 
- Caution with fluids - Continue bumex 1 PRN 
 
 
FEN/GI - Regular diet. No fluids Activity - Ambulate as tolerated DVT prophylaxis - SCDs GI prophylaxis - Protonix Fall prophylaxis - Fall precautions ordered. Disposition - Admit to Medical. Plan to d/c to TBD. Consulting PT, OT and CM Code Status - DNR. Discussed with Patient Next of Rip Montgomery Name and Contact - Mainor Santiago - [phone] Patient Becky Lees will be discussed with Dr. Joseph Haney. 10:47 PM, 05/02/21 Jr Roy MD 
Family Medicine Resident For Billing Chief Complaint Patient presents with  Leg Pain Hospital Problems  Date Reviewed: 1/18/2021 Codes Class Noted POA Debility ICD-10-CM: R53.81 ICD-9-CM: 799.3  5/2/2021 Unknown

## 2021-05-03 NOTE — CONSULTS
Palliative Medicine Consult  William: [phone] ([phone])    Patient Name: Becky Lees  YOB: 1925    Date of Initial Consult: 5/3/21  Reason for Consult: care decisions  Requesting Provider: Dr. Varghese Notice  Primary Care Physician: Chun Leong MD     SUMMARY:   Becky Lees is a 80 y. o. 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 She had imaging in the ED to r/o fractures following her fall which revealed a mass in her chest.  This prompted CT abd/pelvis which showed a segment of ascending colon highly suspicious for malignancy and a 2.4 cm lung nodule also possibly a met or 2nd primary. Pt was last hospitalized in Sept 2020 for suspected GIB, stools were dark and her Hg was 7.2. She was transfused 1 unit but went home declining further workup. She has not been transfused since that time but has lost 16% of her weight since that admit. Psychosocial Hx- . Has two stepdaughters. No children of her own. She has several nieces/nephews whom she keeps in touch with. Resides at home independently with some intermittent help from her family. Last had hired caregivers a year ago. PALLIATIVE DIAGNOSES:   1. Goals of care discussion  2. Suspected colorectal cancer  3. Cachexia- 16% wt loss since Sept  4. Lung nodule  5. Microcytic anemia  6. HFrEF- EF 35-40% (TTE 9/2020)  7. TVR/MVR        PLAN:   1. Met with patient alongside Cullen Padron LCSW on 3rd attempt. 2. She was alert, pleasant. Required assistance back to bed. 3. She carries a diagnosis of dementia per primary care notes but is not on any dementia specific medications at this time.   Suspect disease is in early stages or is categorized as MCI as she has to this point continued to live alone and manage her own finances. 4. I do deem her to have capacity for healthcare decision making at this time though suspect she may have poor recall. 5. I relayed information to her today about CT scan findings, specifically sharing we think she has colon cancer. She was grateful for information and felt at clear peace knowing this. She recalled colorectal surgery being recommended back in 2008 which she declined. She is clear that she does not want further testing or workup for any medical concerns. Her desire is to die comfortably when the time comes. 6. I recommended Hospice support at discharge which patient accepted. Consult placed to case management for Hospice referral.   7. Order written for comfort care. Discontinued daily labs as they will not guide us. Stop her statin as little benefit at this juncture. Blood sugars are also consistently < 150, will stop SS insulin. 8. Schedule APAP 650 mg TID for msk pain related to her falls. 9. With patient's permission [address] [place] [address] and I spoke to patient's niece Robin Reina who resides across the street. Robin Reina shared that patient's two step-daughters help out only for short time periods on an inconsistent basis. She confirmed pt manages her own finances and has not named any financial or healthcare POAs to date. I shared the concern about malignancy and Oneyda's plan to focus on her comfort, no further hospital interventions. Robin Reina confirmed this was very consistent with her Aunt's wishes over the years. 10. Dispo TBD. She requires 24/7 supervision at this time and decline is expected over next several months. Anticipate EOL in many months. She sounds to have resources to pay for caregivers at home or move to a NH. Will defer to CM and Hospice agency of choice for assistance with this process.     11. Thank you for the opportunity to participate in the care of [address]  12. Will follow peripherally. 13. Communicated plan of care with: [facility]iative [facility] Team     GOALS OF CARE / TREATMENT PREFERENCES:     GOALS OF CARE:  Patient/Health Care Proxy Stated Goals: Comfort    TREATMENT PREFERENCES:   Code Status: DNR    Advance Care Planning:  [x] The [facility] Interdisciplinary Team has updated the ACP Navigator with Health Care Decision Maker and Patient Capacity      Primary Decision Maker: Jnoas Alvarez (niece) - Other Relative - [phone]    Primary Decision Maker: Hi Dumont - Other Relative - [phone]     Advance Care Planning 5/3/2021   Patient's Healthcare Decision Maker is: Legal Next of Kin   Confirm Advance Directive None   Patient Would Like to Complete Advance Directive (No Data)   Does the patient have other document types Do Not Resuscitate       Medical Interventions: Comfort measures     Other:    As far as possible, the palliative care team has discussed with patient / health care proxy about goals of care / treatment preferences for patient. HISTORY:     History obtained from: patient, chart review    CHIEF COMPLAINT: pain in right leg    HPI/SUBJECTIVE:    The patient is:   [x] Verbal and participatory  [] Non-participatory due to:     81 yo woman who was admitted due to recent increase in frequency of falls, need for therapy assessments and need for safe disposition. Patient endorses feeling generally well today. She is experiencing intermittent pain in her right proximal leg and neck after most recent fall. No fractures were identified on recent imaging. She denies nausea, reduced appetite (states eating better than usual lately) or pain to other sites. Clinical Pain Assessment (nonverbal scale for severity on nonverbal patients):   Clinical Pain Assessment  Severity: 3  Location: right distal femur  Character: sharp ache  Duration: days to weeks?   Effect: limits mobility  Factors: improves with rest  Frequency: intermittent dependent upon activity level and position          Duration: for how long has pt been experiencing pain (e.g., 2 days, 1 month, years)  Frequency: how often pain is an issue (e.g., several times per day, once every few days, constant)     FUNCTIONAL ASSESSMENT:     Palliative Performance Scale (PPS):  PPS: 50       PSYCHOSOCIAL/SPIRITUAL SCREENING:     Palliative IDT has assessed this patient for cultural preferences / practices and a referral made as appropriate to needs (Cultural Services, Patient Advocacy, Ethics, etc.)    Any spiritual / Sabianism concerns:  [] Yes /  [x] No    Caregiver Burnout:  [] Yes /  [] No /  [x] No Caregiver Present      Anticipatory grief assessment:   [x] Normal  / [] Maladaptive       ESAS Anxiety: Anxiety: 0    ESAS Depression: Depression: 0        REVIEW OF SYSTEMS:     Positive and pertinent negative findings in ROS are noted above in HPI. The following systems were [x] reviewed / [] unable to be reviewed as noted in HPI  Other findings are noted below. Systems: constitutional, ears/nose/mouth/throat, respiratory, gastrointestinal, genitourinary, musculoskeletal, integumentary, neurologic, psychiatric, endocrine. Positive findings noted below. Modified ESAS Completed by: provider   Fatigue: 0 Drowsiness: 0   Depression: 0 Pain: 3   Anxiety: 0 Nausea: 0   Anorexia: 0 Dyspnea: 0     Constipation: No     Stool Occurrence(s): 0        PHYSICAL EXAM:     From RN flowsheet:  Wt Readings from Last 3 Encounters:   05/03/21 115 lb 8.3 oz (52.4 kg)   01/15/21 116 lb (52.6 kg)   12/18/20 138 lb (62.6 kg)     Blood pressure 117/62, pulse 95, temperature 97.1 °F (36.2 °C), resp. rate 20, height 5' 5\" (1.651 m), weight 115 lb 8.3 oz (52.4 kg), SpO2 98 %.     Pain Scale 1: Numeric (0 - 10)  Pain Intensity 1: 0                   Constitutional: elderly woman generally well in appearance  Eyes: sclerae anicteric  ENMT: no nasal discharge, moist mucous membranes  Cardiovascular: regular rhythm, distal pulses intact  Respiratory: breathing not labored on RA, symmetric  Gastrointestinal: soft non-tender, +bowel sounds  Musculoskeletal: tender to palpation in right distal thigh area  Skin: warm, dry  Neurologic: A&O x 3, cognition largely intact, following commands, moving all extremities, no tremor or rigidity  Psychiatric: normal affect, calm and cooperative       HISTORY:     Active Problems:    Debility (5/2/2021)      Past Medical History:   Diagnosis Date    Abnormal urinalysis 5/15/2019    Anemia     Breast cancer (HCC)     Cancer (HCC)     left breast    Cataract     Chronic renal failure     Colonic polyp     D[facility] [address]     Diabetes ([facility] [address]     Eczema     Headache     Hyperlipemia     Hypothyroidism     Ill-defined condition     high cholesterol, hypothyroid    Osteopenia     Posterior vitreous detachment, left     Type 2 diabetes mellitus (HCC)     UTI (urinary tract infection)     Vitamin D deficiency       Past Surgical History:   Procedure Laterality Date    HX APPENDECTOMY      HX CATARACT REMOVAL      HX COLONOSCOPY  2009    HX GYN      hysterectomy    HX MASTECTOMY  1992    S/P Left breast ,edial segmental mastectomy- 1992;    HX ORTHOPAEDIC      back    [place] BREAST SURGERY PROCEDURE UNLISTED      left breast lumpectomy      Family History   Problem Relation Age of Onset    No Known Problems Mother     No Known Problems Father       History reviewed, no pertinent family history.   Social History     Tobacco Use    Smoking status: Never Smoker    Smokeless tobacco: Never Used   Substance Use Topics    Alcohol use: No     No Known Allergies   Current Facility-Administered Medications   Medication Dose Route Frequency    acetaminophen (TYLENOL) tablet 650 mg  650 mg Oral TID    promethazine (PHENERGAN) tablet 12.5 mg  12.5 mg Oral Q6H PRN    Or    ondansetron (ZOFRAN) injection 4 mg  4 mg IntraVENous Q6H PRN    bumetanide (BUMEX) tablet 1 mg  1 mg Oral PRN    docusate sodium (COLACE) capsule 100 mg  100 mg Oral BID    ferrous sulfate tablet 325 mg  325 mg Oral BID    levothyroxine (SYNTHROID) tablet 50 mcg  50 mcg Oral DAILY    midodrine (PROAMATINE) tablet 2.5 mg  2.5 mg Oral DAILY    pantoprazole (PROTONIX) tablet 40 mg  40 mg Oral ACB    polyethylene glycol (MIRALAX) packet 17 g  17 g Oral BID PRN          LAB AND IMAGING FINDINGS:     Lab Results   Component Value Date/Time    WBC 8.5 05/03/2021 06:35 AM    HGB 7.3 (L) 05/03/2021 06:35 AM    PLATELET 977 06/21/6902 06:35 AM     Lab Results   Component Value Date/Time    Sodium 140 05/03/2021 06:35 AM    Potassium 4.6 05/03/2021 06:35 AM    Chloride 110 (H) 05/03/2021 06:35 AM    CO2 27 05/03/2021 06:35 AM    BUN 19 05/03/2021 06:35 AM    Creatinine 1.45 (H) 05/03/2021 06:35 AM    Calcium 8.2 (L) 05/03/2021 06:35 AM    Magnesium 1.8 09/22/2020 03:55 AM    Phosphorus 2.8 09/22/2020 03:55 AM      Lab Results   Component Value Date/Time    Alk. phosphatase 72 09/22/2020 03:55 AM    Protein, total 5.5 (L) 09/22/2020 03:55 AM    Albumin 2.4 (L) 09/22/2020 03:55 AM    Globulin 3.1 09/22/2020 03:55 AM     Lab Results   Component Value Date/Time    INR 1.1 09/21/2020 02:38 PM    Prothrombin time 11.6 (H) 09/21/2020 02:38 PM      Lab Results   Component Value Date/Time    Iron 16 (L) 09/17/2020 09:20 AM    TIBC 267 09/17/2020 09:20 AM    Iron % saturation 6 (LL) 09/17/2020 09:20 AM    Ferritin 22 09/17/2020 09:20 AM      No results found for: PH, PCO2, PO2  No components found for: GLPOC   No results found for: CPK, CKMB             Total time: 70m  Counseling / coordination time, spent as noted above: 50m  > 50% counseling / coordination?: y    Prolonged service was provided for  []30 min   []75 min in face to face time in the presence of the patient, spent as noted above.   Time Start:   Time End:   Note: this can only be billed with 38749 (initial) or 58072 (follow up).  If multiple start / stop times, list each separately.

## 2021-05-03 NOTE — PROGRESS NOTES
5/3/2021   CARE MANAGEMENT NOTE:    Reason for Admission:   Colon mass, DM, and dementia, Covid r/o                    RUR Score:     20%, Mod risk             PCP: First and Last name:   Sam Rossi MD     Name of Practice:    Are you a current patient: Yes/No: Yes   Approximate date of last visit: Unknown at this writing   Can you participate in a virtual visit if needed: Unknown at this writing    Do you (patient/family) have any concerns for transition/discharge? Pt lives alone               Plan for utilizing home health:   No    Current Advanced Directive/Advance Care Plan:  DNR      Healthcare Decision Maker:   Click here to complete [id number] including selection of the Healthcare Decision Maker Relationship (ie \"Primary\")            Primary Decision Maker: Catie Johnjuana (niece) - Other Relative - [phone]    Primary Decision Maker: Pio Mann - Other Relative - [phone]    Transition of Care Plan:     1. CM received order for hospice - a referral was sent to [facility] for info session. CM will continue to follow pt for discharge planning.   Yves

## 2021-05-03 NOTE — PROGRESS NOTES
Problem: Self Care Deficits Care Plan (Adult)  Goal: *Acute Goals and Plan of Care (Insert Text)  Description:   FUNCTIONAL STATUS PRIOR TO ADMISSION: Patient was modified independent using a RW or cane for functional mobility. Has had several falls. HOME SUPPORT: The patient lived alone, states she has caregivers daily that assist with IADLs. Occupational Therapy Goals  Initiated 5/3/2021  1. Patient will perform grooming with supervision/set-up in unsupported sit within 7 day(s). 2.  Patient will perform lower body dressing with supervision/set-up within 7 day(s). 3.  Patient will perform toilet transfers with supervision/set-up within 7 day(s). 4.  Patient will perform all aspects of toileting with supervision/set-up within 7 day(s). 5.  Patient will participate in upper extremity therapeutic exercise/activities with supervision/set-up for 5 minutes within 7 day(s). Outcome: Progressing Towards Goal   OCCUPATIONAL THERAPY EVALUATION  Patient: Pallavi Willis (89 y.o. female)  Date: 5/3/2021  Primary Diagnosis: Debility [R53.81]        Precautions:       ASSESSMENT  Based on the objective data described below, the patient presents with decreased balance, endurance and strength, confusion, neck and R hip pain and impaired functional mobility following admission for debility. Pt received supine in bed, agreeable to participate. Performed bed mobility with SBA, fair sitting balance. Using RW she ambulated to bathroom, overall min A for transfers, required frequent cueing for hand placement and safe RW management. Transferred to chair where she performed hand hygiene in sitting with setup. VSS, pt c/o neck pain when ambulating. At this time pt is below her functional baseline, requires assistance for ADLs and is at risk for falls. Recommend SNF vs. HH therapy with 24/7 assistance at discharge.     Current Level of Function Impacting Discharge (ADLs/self-care): Min A transfers, setup- max A ADLs    Functional Outcome Measure: The patient scored 55/100 on the Barthel Index outcome measure. Other factors to consider for discharge: fall risk, palliative consult, lives alone and will need 24/7 assist     Patient will benefit from skilled therapy intervention to address the above noted impairments. PLAN :  Recommendations and Planned Interventions: self care training, functional mobility training, therapeutic exercise, balance training, therapeutic activities, endurance activities, patient education, home safety training, and family training/education    Frequency/Duration: Patient will be followed by occupational therapy 5 times a week to address goals. Recommendation for discharge: (in order for the patient to meet his/her long term goals)  SNF vs. HH therapy with 24/7 assistance pending progress and goals of care    This discharge recommendation:  Has not yet been discussed the attending provider and/or case management    IF patient discharges home will need the following DME: TBD       SUBJECTIVE:   Patient stated I'm ready to get out of here.     OBJECTIVE DATA SUMMARY:   HISTORY:   Past Medical History:   Diagnosis Date    Abnormal urinalysis 5/15/2019    Anemia     [facility] [address]     Cancer (HCC)     left breast    Cataract     Chronic renal failure     Colonic polyp     Dementia (HCC)     Diabetes ([address]     Eczema     Headache     Hyperlipemia     Hypothyroidism     Ill-defined condition     high cholesterol, hypothyroid    Osteopenia     Posterior vitreous detachment, left     Type 2 diabetes mellitus (HCC)     UTI (urinary tract infection)     Vitamin D deficiency      Past Surgical History:   Procedure Laterality Date    HX APPENDECTOMY      HX CATARACT REMOVAL      HX COLONOSCOPY  2009    HX GYN      hysterectomy    HX MASTECTOMY  1992    S/P Left breast ,edial segmental mastectomy- 1992;    HX ORTHOPAEDIC      back    [place] BREAST SURGERY PROCEDURE UNLISTED      left breast lumpectomy       Expanded or extensive additional review of patient history:     Home Situation  Home Environment: Private residence  # Steps to Enter: 6  Rails to Enter: Yes  Hand Rails : Right  One/Two Story Residence: One story  Living Alone: Yes  Support Systems: Family member(s)(caregivers daily )  Patient Expects to be Discharged to[de-identified] Private residence  Current DME Used/Available at Home: Wheelchair, Walker, rolling, Shower chair, Cane, straight, Grab bars  Tub or Shower Type: Shower    Hand dominance: Right    EXAMINATION OF PERFORMANCE DEFICITS:  Cognitive/Behavioral Status:  Neurologic State: Alert     Cognition: Follows commands;Decreased attention/concentration  Perception: Appears intact  Perseveration: No perseveration noted  Safety/Judgement: Fall prevention;Decreased insight into deficits    Hearing: Auditory  Auditory Impairment: Hard of hearing, bilateral    Vision/Perceptual:         Acuity: Within Defined Limits         Range of Motion:  AROM: Generally decreased, functional  PROM: Within functional limits          Strength:  Strength: Generally decreased, functional     Coordination:  Coordination: Generally decreased, functional  Fine Motor Skills-Upper: Left Intact; Right Intact    Gross Motor Skills-Upper: Left Intact; Right Intact    Tone & Sensation:  Tone: Normal  Sensation: Intact            Balance:  Sitting: Intact  Standing: Impaired  Standing - Static: Constant support; Fair  Standing - Dynamic : Fair;Constant support    Functional Mobility and Transfers for ADLs:  Bed Mobility:  Rolling: Supervision  Supine to Sit: Supervision  Sit to Supine: Supervision  Scooting: Supervision    Transfers:  Sit to Stand: Minimum assistance  Stand to Sit: Minimum assistance  Toilet Transfer : Minimum assistance; Adaptive equipment; Additional time    ADL Assessment:  Feeding: Minimum assistance    Oral Facial Hygiene/Grooming: Minimum assistance    Bathing:  Moderate assistance; Adaptive equipment; Additional time    Upper Body Dressing: Minimum assistance    Lower Body Dressing: Maximum assistance    Toileting: Maximum assistance         ADL Intervention and task modifications:       Grooming  Position Performed: Seated in chair  Washing Hands: Set-up; Supervision  Cues: Verbal cues provided         Toileting  Bladder Hygiene: Minimum assistance    Cognitive Retraining  Safety/Judgement: Fall prevention;Decreased insight into deficits    Functional Measure:  Barthel Index:    Bathin  Bladder: 5  Bowels: 10  Groomin  Dressin  Feeding: 10  Mobility: 5  Stairs: 0  Toilet Use: 5  Transfer (Bed to Chair and Back): 10  Total: 55/100        The Barthel ADL Index: Guidelines  1. The index should be used as a record of what a patient does, not as a record of what a patient could do. 2. The main aim is to establish degree of independence from any help, physical or verbal, however minor and for whatever reason. 3. The need for supervision renders the patient not independent. 4. A patient's performance should be established using the best available evidence. Asking the patient, friends/relatives and nurses are the usual sources, but direct observation and common sense are also important. However direct testing is not needed. 5. Usually the patient's performance over the preceding 24-48 hours is important, but occasionally longer periods will be relevant. 6. Middle categories imply that the patient supplies over 50 per cent of the effort. 7. Use of aids to be independent is allowed. Patrice Garner., Barthel, D.W. ([id number]). Functional evaluation: the Barthel Index. [facility] (14)2. BOB Ghosh, Hannah Diallo., Christelle Maynard., [place], [address] [place] [address] (). Measuring the change indisability after inpatient rehabilitation; comparison of the responsiveness of the Barthel Index and Functional Coleman Measure.  Journal of Neurology, Neurosurgery, and Psychiatry, 66(4), [phone]. SCOTT Flores, BERNARDINO Real, & Jalen Holt M.A. (2004.) Assessment of post-stroke quality of life in cost-effectiveness studies: The usefulness of the Barthel Index and the EuroQoL-5D. Quality of Life Research, 15, 447-18        Occupational Therapy Evaluation Charge Determination   History Examination Decision-Making   LOW Complexity : Brief history review  MEDIUM Complexity : 3-5 performance deficits relating to physical, cognitive , or psychosocial skils that result in activity limitations and / or participation restrictions MEDIUM Complexity : Patient may present with comorbidities that affect occupational performnce. Miniml to moderate modification of tasks or assistance (eg, physical or verbal ) with assesment(s) is necessary to enable patient to complete evaluation       Based on the above components, the patient evaluation is determined to be of the following complexity level: LOW   Pain Rating:  Pt c/o neck and R hip pain but did not quantify    Activity Tolerance:   Fair and requires rest breaks    After treatment patient left in no apparent distress:    Sitting in chair, Call bell within reach, and Bed / chair alarm activated    COMMUNICATION/EDUCATION:   The patients plan of care was discussed with: Physical therapist and Registered nurse. Home safety education was provided and the patient/caregiver indicated understanding., Patient/family have participated as able in goal setting and plan of care. , and Patient/family agree to work toward stated goals and plan of care. This patients plan of care is appropriate for delegation to [facility] [place].     Thank you for this referral.  Marifer Jones OT  Time Calculation: 22 mins

## 2021-05-03 NOTE — PROGRESS NOTES
[facility] MEDICINE RESIDENCY PROGRAM  PROGRESS NOTE     5/3/2021  PCP: Mayank Gregory MD     Assessment/Plan:     Aldo Leiva is a 80 y.o. female with a PMHx of  breast cancer, DM2, L arm lymphedema, dementia, OA, hypothyroidism, GERD, orthostatic hypotension, iron deficiency, constipation, HFrEF, HLD who is admitted for progressive debility and dispo and incidentally found to have ascending colon lesion and RUE lesion concerning for malignancy. Debility: Progressive. May be 2/2 malignancy found on imaging in right lung and colon. May be also be 2/2 R hip trauma sustained during GLF a week ago; though the malignancy and associated anemia may have caused the fall also.  - PT/OT/CM consulted for dispo  - Palliative consulted, appreciate recs    Right shoulder pain: S/p trauma sustained during GLF 1 week ago. R shoulder xray with no acute process    Ascending colon mass: Found incidentally on CT pelvis. If neoplasm, it may be the source of the possible met in the lung as well. Will need to cover for colitis. Colitis with associated IVVD could have caused GLF in this patient  - Zosyn for intraabdominal infection  - BCx to be collected before antibiotics started  - Pan scan per radiology recs to look for further mets  - Palliative consulted as above    Anemia: HGB 7.6. (at baseline). Chronic 2/2 anemia of chronic disease likely 2/2 malignant process. Morning CBC stable  - Daily CBC  - Type and screen   - Continue home iron 325 every day  - Ok for prophylactic lovenox for DVT ppx    Constipation: At home on colace 100 BID and miralax PRN  - Continue home meds    Dementia: Stable. No home meds    GERD: At home on prilosec 20 every day  - Substitute for protonix 40 per formulary    DM2: Patient not on any home meds. - SSI ACHS glucose checks  - Hypoglycemia protocols    Hypothyroidism: Patient at home on levothyroxine 50 every day.  Last TSH was 2.45 (wnl) on 9/21/20  - Continue home levothyroxine 50    HLD: At home on lipitor 40  - Continue home lipitor 40  - Consider discontinuing in outpatient setting as patient is 80years old with life expectancy <10 years     Orthostatic hypotension: Recently started on midodrine 2.5 every day  - Continue home midodrine 2.5 every day    L arm lymphedema: Chronic. 2/2 breast cancer s/p resection >10 years ago. Hyperkalemia: 5.2 POA. Possibly secondary to GI bleed  - BMP daily    HFrEF: Last echo 2020 with EF 35-40%. Patient on bumex 1 PRN  - Caution with fluids  - Continue bumex 1 PRN      FEN/GI - Regular diet. No fluids  Activity - Ambulate as tolerated  DVT prophylaxis - Lovenox  GI prophylaxis - Protonix  Fall prophylaxis - Fall precautions ordered. Disposition - Admit to Medical. Plan to d/c to TBD. Consulting PT, OT and CM  Code Status - DNR. Discussed with Patient  Next of [place] [address] Name and Contact - Dc Singleton,  [place] - [phone]    Patient Becky Lees will be discussed with Dr. Joseph Haney. Subjective:   Pt was seen and examined at bedside. Denies chest pain, SOB, nausea, vomiting, diarrhea, abdominal pain, dizziness, headache, dysuria. R shoulder about the same. Patient enjoying dinner. Adamantly denying IV access    Objective:     Patient Vitals for the past 24 hrs:   Temp Pulse Resp BP SpO2   21 0009 98.5 °F (36.9 °C) 65 16 (!) 169/64 100 %   21 1946 97.8 °F (36.6 °C) 72 16 (!) 133/55 98 %   21 1753 97.3 °F (36.3 °C) 67 16 (!) 174/66 100 %   21 1751   18     21 1206 96.9 °F (36.1 °C) 67 16 (!) 123/52 99 %      Temp (24hrs), Av.6 °F (36.4 °C), Min:96.9 °F (36.1 °C), Max:98.5 °F (36.9 °C)         O2 Device: None (Room air)    Date 21 0700 - 21 07 - 21 0659   Shift 3648-9197 2906-9168 24 Hour Total 1404-8574 9425-5518 24 Hour Total   INTAKE   P.O.  1000 1000        P. O.  1000 1000      I.V.  0 0        Volume (piperacillin-tazobactam (ZOSYN) 3.375 g in 0.9% sodium chloride (MBP/ADV) 100 mL MBP)  0 0      Shift Total  1000 1000      OUTPUT   Urine  250 250        Urine Voided  250 250        Urine Occurrence(s)  0 x 0 x      Emesis/NG output           Emesis Occurrence(s)  0 x 0 x      Stool           Stool Occurrence(s)  0 x 0 x      Shift Total  250 250      NET  750 750      Weight (kg)             Physical Exam  General: No acute distress. Alert. Cooperative. Head: Normocephalic. Atraumatic. Eyes:              Conjunctiva pink. Sclera white. EOMI. Ears:              Hearing impaired bilaterally   Nose:             Septum midline. Mucosa pink. No drainage. Throat: Mucosa pink. Moist mucous membranes. No tonsillar exudates or erythema. Palate movement equal bilaterally. Neck: Supple. Normal ROM. No stiffness. Respiratory: CTAB. No w/r/r/c.   Cardiovascular: RRR. Normal S1,S2. No m/r/g.   GI: + bowel sounds. Nontender. No rebound tenderness or guarding. Nondistended. Extremities: No LE edema. Distal pulses intact. LUE nonpitting edema present. This is patients baseline. No swelling or erythema on right hip. Right hip TTP   Musculoskeletal: Full ROM in all extremities. Skin: Warm, dry. No rashes. Neuro: No focal deficits noted. No sensory deficits in RLE          Data Review:     CBC:  Recent Labs     05/02/21  1645   WBC 9.1   HGB 7.6*   HCT 25.2*   *     Metabolic Panel:  Recent Labs     05/02/21  1645      K 5.2*   *   CO2 26   BUN 20   CREA 1.69*   GLU 95   CA 8.9     Micro:  Lab Results   Component Value Date/Time    Culture result: NO GROWTH 2 DAYS 05/15/2019 04:15 PM     Imaging:  Xr Hip Rt W Or Wo Pelv 2-3 Vws    Result Date: 5/2/2021  EXAM: XR HIP RT W OR WO PELV 2-3 VWS INDICATION: fall with R hip pain. COMPARISON: None. FINDINGS: AP view of the pelvis and a frogleg lateral view of the right hip demonstrate no fracture, dislocation or other acute abnormality. Degenerative changes of the spine. No acute abnormality.     Ct Head Wo Cont    Result Date: 5/2/2021  EXAM: CT HEAD WO CONT INDICATION: fall COMPARISON: August 6. CONTRAST: None. TECHNIQUE: Unenhanced CT of the head was performed using 5 mm images. Brain and bone windows were generated. Coronal and sagittal reformats. CT dose reduction was achieved through use of a standardized protocol tailored for this examination and automatic exposure control for dose modulation. FINDINGS: The ventricles and sulci are enlarged. There are bilateral basal ganglia calcifications. There are vertebral artery calcifications There is no significant white matter disease. There is no intracranial hemorrhage, extra-axial collection, or mass effect. The basilar cisterns are open. No CT evidence of acute infarct. The bone windows demonstrate no abnormalities. The visualized portions of the paranasal sinuses and mastoid air cells are clear. No acute findings. Ct Spine Cerv Wo Cont    Result Date: 5/2/2021  EXAM:  CT CERVICAL SPINE WITHOUT CONTRAST INDICATION: fall w neck pain. COMPARISON: August 6, 2020 CONTRAST:  None. TECHNIQUE: Multislice helical CT of the cervical spine was performed without intravenous contrast administration. Sagittal and coronal reformats were generated. CT dose reduction was achieved through use of a standardized protocol tailored for this examination and automatic exposure control for dose modulation. FINDINGS: The alignment is within normal limits. There is no fracture or compression deformity. The odontoid process is intact. The craniocervical junction is within normal limits. Calcification of the atlantoaxial ligament. Spiculated 2.4 cm mass in the right upper lobe. The incidentally imaged soft tissues are within normal limits. C2-C3: Bilateral facet arthropathy. Disc space narrowing. Significant right neural foraminal narrowing. C3-C4: There is no spinal canal or neural foraminal stenosis. C4-C5: Bilateral facet arthropathy. C5-C6: Disc space narrowing.  Uncinate process hypertrophy with left neural foraminal narrowing. C6-C7: There is no spinal canal or neural foraminal stenosis. C7-T1: There is no spinal canal or neural foraminal stenosis. 1. Multilevel degenerative changes without fracture. 2. Right upper lobe mass. CT of the thorax recommended. Ct Pelv Wo Cont    Result Date: 5/2/2021  *PRELIMINARY REPORT* CT of the pelvis demonstrates no fracture. Preliminary report was provided by Dr. Hiwot Day, the on-call radiologist, at 4:46 PM Final report to follow. *END PRELIMINARY REPORT* *FINAL REPORT BELO* EXAM: CT PELV WO CONT INDICATION: Right hip pain after fall, difficulty walking. COMPARISON: CT pelvis on 5/69/4377 TECHNIQUE: Helical CT of the bony pelvis with coronal and sagittal reformats. Images reviewed in soft tissue and bone windows. CT dose reduction was achieved through the use of a standardized protocol tailored for this examination and automatic exposure control for dose modulation. CONTRAST: None. FINDINGS: Bones: Bones are osteopenic. No acute fracture. No aggressive bone lesion. Joint fluid: None. Articulations: Mild bilateral hip osteoarthritis. Lumbar spine degenerative disease is unchanged. Tendons: Bilateral hamstring origin calcific tendinosis. Muscles: No intramuscular hematoma. No focal atrophy. Soft tissue mass: None. Mural thickening of the proximal ascending colon is partially imaged. Edema is in the fat posterior to the ascending colon. 1. No fracture. 2. Ascending colitis versus neoplasm is partially imaged. Correlate with CBC. Recommend CT abdomen/pelvis with oral and IV contrast when patient can tolerate.       Medications reviewed  Current Facility-Administered Medications   Medication Dose Route Frequency    insulin lispro (HUMALOG) injection   SubCUTAneous AC&HS    glucose chewable tablet 16 g  4 Tab Oral PRN    dextrose (D50W) injection syrg 12.5-25 g  12.5-25 g IntraVENous PRN    glucagon (GLUCAGEN) injection 1 mg  1 mg IntraMUSCular PRN    sodium chloride (NS) flush 5-40 mL  5-40 mL IntraVENous Q8H    sodium chloride (NS) flush 5-40 mL  5-40 mL IntraVENous PRN    acetaminophen (TYLENOL) tablet 650 mg  650 mg Oral Q6H PRN    Or    acetaminophen (TYLENOL) suppository 650 mg  650 mg Rectal Q6H PRN    polyethylene glycol (MIRALAX) packet 17 g  17 g Oral DAILY PRN    promethazine (PHENERGAN) tablet 12.5 mg  12.5 mg Oral Q6H PRN    Or    ondansetron (ZOFRAN) injection 4 mg  4 mg IntraVENous Q6H PRN    piperacillin-tazobactam (ZOSYN) 3.375 g in 0.9% sodium chloride (MBP/ADV) 100 mL MBP  3.375 g IntraVENous Q12H    atorvastatin (LIPITOR) tablet 40 mg  40 mg Oral DAILY    bumetanide (BUMEX) tablet 1 mg  1 mg Oral PRN    docusate sodium (COLACE) capsule 100 mg  100 mg Oral BID    ferrous sulfate tablet 325 mg  325 mg Oral BID    levothyroxine (SYNTHROID) tablet 50 mcg  50 mcg Oral DAILY    midodrine (PROAMATINE) tablet 2.5 mg  2.5 mg Oral DAILY    pantoprazole (PROTONIX) tablet 40 mg  40 mg Oral ACB    polyethylene glycol (MIRALAX) packet 17 g  17 g Oral BID PRN         Signed:   Bety Hercules MD   Resident, Family Medicine      Attending note: Attending note to follow. ..

## 2021-05-03 NOTE — PROGRESS NOTES
Comprehensive Nutrition Assessment    Type and Reason for Visit: Initial, Positive nutrition screen    Nutrition Recommendations/Plan:   1. Continue regular diet. 2. Add Ensure Enlive BID to promote intake. Nutrition Assessment:      5/3: 81 y/o female admitted with debility. PMH includes DM, HTN, CHF, dementia, hx breast CA. Per MD note, pt with possible neoplasm of lungs/colon on CT. Pt sleeping soundly at time of visit, no family in room. She has had 17% wt loss x4.5 months per EMR which is severe for time frame. Pt also with significant fat/muslce wasting. Currently meets criteria for severe malnutrition. Will add Ensure with meals and monitor acceptance. Labs- Cr 1.45, Hgb 7.3. Meds- colace, FeSu, synthroid, Midodrine, Protonix. Weight hx: Wt Readings from Last 10 Encounters:   05/03/21 52.4 kg (115 lb 8.3 oz)   01/15/21 52.6 kg (116 lb)   12/18/20 62.6 kg (138 lb)   09/30/20 62.6 kg (138 lb)   09/23/20 64.5 kg (142 lb 1.6 oz)   09/21/20 63 kg (139 lb)   09/17/20 63 kg (139 lb)   08/14/20 59.9 kg (132 lb)   08/06/20 54.4 kg (120 lb)   04/28/20 54.4 kg (120 lb)     Malnutrition Assessment:  Malnutrition Status:  Severe malnutrition    Context:  Chronic illness     Findings of the 6 clinical characteristics of malnutrition:   Energy Intake:  Unable to assess  Weight Loss:  7.0 - Greater than 7.5% over 3 months     Body Fat Loss:  1 - Mild body fat loss, Orbital   Muscle Mass Loss:  7 - Severe muscle mass loss, Temples (temporalis), Hand (interosseous)  Fluid Accumulation:  No significant fluid accumulation,     Strength:  Not performed     Estimated Daily Nutrient Needs:  Energy (kcal): 7640-5577(76-23 kcal/kg); Weight Used for Energy Requirements: Current  Protein (g): 52(1-1.2 g/kg);  Weight Used for Protein Requirements: Current  Fluid (ml/day): 0674-7620; Method Used for Fluid Requirements: 1 ml/kcal      Nutrition Related Findings:  last BM not doucmented      Wounds:    None       Current Nutrition Therapies:  DIET REGULAR    Anthropometric Measures:  · Height:  5' 5\" (165.1 cm)  · Current Body Wt:  52.2 kg (115 lb)   · Admission Body Wt:       · Usual Body Wt:        · Ideal Body Wt:  125 lbs:  92 %   · Adjusted Body Weight:   ; Weight Adjustment for: No adjustment   · Adjusted BMI:       · BMI Category:  Underweight (BMI less than 22) age over 72       Nutrition Diagnosis:   · Severe malnutrition related to inadequate protein-energy intake as evidenced by weight loss, severe muscle loss, mild loss of subcutaneous fat      Nutrition Interventions:   Food and/or Nutrient Delivery: Continue current diet, Start oral nutrition supplement  Nutrition Education and Counseling: No recommendations at this time  Coordination of Nutrition Care: Continue to monitor while inpatient    Goals:  PO intake >50% meals/supplements with weight maintenance next 1-3 days       Nutrition Monitoring and Evaluation:   Behavioral-Environmental Outcomes: None identified  Food/Nutrient Intake Outcomes: Food and nutrient intake, Supplement intake  Physical Signs/Symptoms Outcomes: Weight, Biochemical data, Nutrition focused physical findings, GI status    Discharge Planning:    Continue current diet, Continue oral nutrition supplement     Electronically signed by Yolette Collins RDN on 5/3/2021 at 12:09 PM    Contact: [phone]

## 2021-05-03 NOTE — PROGRESS NOTES
[facility]   Senior Resident Admission Note    CC: debility    HPI:  Aldo Leiva is a 80 y.o. female with PMH of  history of breast cancer on the left side with associated lymphedema, hyperlipidemia, hypothyroidism, dementia and diabetes who presents to the ER from home d/t progressive debility after worsening right hip pain. Patient sustained GLF last week. Patient family reported to ED provider that they would like to look into placement options. Patient Chart reviewed. Patient seen, examined, and discussed with Dr. Amrik Bean (PGY-1). See his note for more details. Visit Vitals  BP (!) 133/55 (BP 1 Location: Right arm, BP Patient Position: At rest)   Pulse 72   Temp 97.8 °F (36.6 °C)   Resp 16   LMP  (LMP Unknown)   SpO2 98%     Labs: Hgb 7.6 (BL)  PE: HDS, HAD, lungs CTAB, heart RRR, abdomen NTND    A/P:     1. Debility: possibly 2/2 malignant process. Patient has known breast cancer history, now found to have possible lung and and ascending colitis vs neoplasm on CT. She had colonoscopy in 2009. Radiologist recommends to get CT chest, abd and pelvis with contrast.  - admit to medical  - regular diet, encourage PO hydration  - hold off on IVF, given EF on 30-35% on last ECHO and baseline Cr POA  - Zosyn renally dosed to cover for intraabdominal infection  - BCx ordered   2. Anemia: iron studies in 9/20 consistent with ACD. POA Hgb 7.6 (at BL)  - Monitor CBC  3. ?Neoplasm in colon and lungs:   - CT C/A/P w/ contrast   - Palliative medicine consulted      I agree with remaining assessment and plan as documented in Dr. Margareth White note.       Pt discussed with Dr Glory Alves (on-call attending physician)    Eric Catalan MD  Family Medicine Resident

## 2021-05-03 NOTE — PROGRESS NOTES
Bedside and Verbal shift change report given to Dilcia Leiva (oncoming nurse) by Margoth Hansen (offgoing nurse). Report included the following information SBAR, Kardex, Intake/Output, MAR and Recent Results.

## 2021-05-03 NOTE — PROGRESS NOTES
Spiritual Care Assessment/Progress Note  [address]      NAME: Charletta Nageotte      MRN: [id number]  AGE: 80 y.o. SEX: female  Confucianist Affiliation: Amish   Language: English     5/3/2021     Total Time (in minutes): 5     Spiritual Assessment begun in [facility]  MED SURG 2 through conversation with:         []Patient        [] Family    [] Friend(s)        Reason for Consult: Palliative Care, Initial/Spiritual Assessment     Spiritual beliefs: (Please include comment if needed)     [] Identifies with a janice tradition:         [] Supported by a janice community:            [] Claims no spiritual orientation:           [] Seeking spiritual identity:                [] Adheres to an individual form of spirituality:           [x] Not able to assess:                           Identified resources for coping:      [] Prayer                               [] Music                  [] Guided Imagery     [] Family/friends                 [] Pet visits     [] Devotional reading                         [x] Unknown     [] Other:                                               Interventions offered during this visit: (See comments for more details)    Patient Interventions: Other (comment)           Plan of Care:     [] Support spiritual and/or cultural needs    [] Support AMD and/or advance care planning process      [] Support grieving process   [] Coordinate Rites and/or Rituals    [] Coordination with community clergy   [] No spiritual needs identified at this time   [] Detailed Plan of Care below (See Comments)  [] Make referral to Music Therapy  [] Make referral to Pet Therapy     [] Make referral to Addiction services  [] Make referral to [facility]  [] Make referral to Spiritual Care Partner  [] No future visits requested        [x] Follow up upon further referrals     Comments:  visited Mrs. Roselyn Longoria for a palliative care initial spiritual assessment on the Med. Surgical Unit.  Mrs. Roselyn Longoria was sitting up in her recliner and appeared to be sleeping comfortably when the  came into the room. Did not disturb her at this time. Per previous 's notes, Mrs. Bettina Del Angel is a woman of janice. 's are available for further support upon referral  Janes Rodriguez.      Paging Service: [phone])

## 2021-05-03 NOTE — PROGRESS NOTES
26: RN notified Family Practice that patient refused any additional attempts to gain IV access. ED attempted to start an IV before being admitted to the floor and all attempts on the floor including ultrasound guided IV failed. Patient additionally refused lab work attempts.

## 2021-05-03 NOTE — PROGRESS NOTES
[facility] Dosing Services:      Pharmacist Renal Dosing Progress Note for Zosyn   Physician Ben Ojeda    The following medication: Zosyn, was automatically dose-adjusted per [facility] P&T Committee Protocol, with respect to renal function. Consult provided for this   80 y.o. , female , for the indication of intra-abdominal infection. Pt Weight:   Wt Readings from Last 1 Encounters:   01/15/21 52.6 kg (116 lb)         Serum Creatinine Lab Results   Component Value Date/Time    Creatinine 1.69 (H) 05/02/2021 04:45 PM    Creatinine (POC) 1.4 (H) 08/08/2017 10:39 AM       Creatinine Clearance <20 based on last weight entered into [facility] of 53 kg   BUN Lab Results   Component Value Date/Time    BUN 20 05/02/2021 04:45 PM    BUN (POC) 15 08/08/2017 10:39 AM           Dosage changed to:  Zosyn 3.375 grams every 12 hours for CrCl<20      Pharmacy to continue to monitor patient daily. Will make dosage adjustments based upon changing renal function. Signed Mckenna Rubi.  Contact information:  [phone]

## 2021-05-03 NOTE — PROGRESS NOTES
Palliative Medicine      Code Status: DNR    Advance Care Planning:  Advance Care Planning 5/3/2021   Patient's Healthcare Decision Maker is: Verbal statement (Legal Next of Kin remains as decision maker)   Confirm Advance Directive Not on file   Patient Would Like to Complete Advance Directive Needs further assessment   Does the patient have other document types Durable Do Not Resuscitate       Patient / Family Encounter Documentation    Participants (names):  Pt, niece Griselda Cespedes by phone, Palliative Medicine (Dr. Vilma Matute, [facility])     Narrative:  Pt was up in chair, had just finished eating lunch, was assisted back into bed per pt request.  Pt was alert and pleasant, shared of various falls at home leading to current hospitalization. Pt lives alone but shared that her niece, Parviz Dooley, is planning to come down from [place] to stay with her, shared that niece Jeny Hiss lives next door. Pt's   in , pt does not have biological children but has several step-children in addition to nieces and nephews. 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 Pt was updated re: results of scans and concern for colon cancer, stated she was not surprised to hear this, shared that she was told in  that she would need to have portion of her intestine removed but opted not to have this done. Pt stated several times that she has lived a good, long life, traveled, survived breast cancer. Pt indicated she is not interested in further workup, would not want treatment for cancer at her age. Pt expressed a sense of peace with her mortality, expressed wish that her dying process if comfortable and tranquil. Hospice was discussed as an option to support pt and family; pt was receptive to learning more about hospice benefit. Spoke with niece Griselda Cespedes by phone shortly after.   Niece denied that Maia Morales would be coming to stay with pt, stated Maia Morales is 80 yrs old, stated she herself is 80. Derick Blunt shared that family members are willing to help out but reports pt is not always willing to pay for care, shared that pt's long term care insurance policy is likely going to  due to non-payment. Derick Blunt reports two of pt's step-dtrs assist but only on a very limited basis. Pt does not have AMD on file, does have DDNR in place. Derick Blunt uncertain if pt ever completed a Medical POA, reports pt has refused to assign a financial POA, still manages her own finances. Psychosocial Issues Identified/ Resilience Factors:  Caregiver stress, pt lives alone, has hx of falls. Pt is now facing possible terminal diagnosis/prognosis but received the news with diana and acceptance. No spiritual concerns identified. Goals of Care / Plan: Pt and niece both acknowledged pt's full life, both expressed wish that pt not suffer as she approaches the end of her journey. Pt confirmed when SW checked back in room that Maia Morales does indeed plan to stay with her, though concrete plans are not in place. Pt confirmed willingness to speak with a hospice representative. SW discussed with Care Manager. Will continue to follow. Thank you for including Palliative Medicine in the care of Ms. Bettina Del Angel.     Nickolas Ferrell LCSW, [facility]-  [phone])

## 2021-05-03 NOTE — PROGRESS NOTES
Bedside and Verbal shift change report given to [address] [place] [address] (oncoming nurse) by Kisha Becker (offgoing nurse).  Report included the following information SBAR, Kardex, ED Summary, Intake/Output, MAR, Accordion and Recent Results.

## 2021-05-03 NOTE — PROGRESS NOTES
Problem: Mobility Impaired (Adult and Pediatric)  Goal: *Acute Goals and Plan of Care (Insert Text)  Description: FUNCTIONAL STATUS PRIOR TO ADMISSION: Patient was modified independent using a walker vs cane for functional mobility however she reports multiple falls over the last month. HOME SUPPORT PRIOR TO ADMISSION: The patient lived alone but reports that she has caregivers assist during the day hours. Physical Therapy Goals  Initiated 5/3/2021  1. Patient will move from supine to sit and sit to supine  and scoot up and down in bed with independence within 7 day(s). 2.  Patient will transfer from bed to chair and chair to bed with supervision/set-up using the least restrictive device within 7 day(s). 3.  Patient will perform sit to stand with supervision/set-up within 7 day(s). 4.  Patient will ambulate with supervision/set-up for 80 feet with the least restrictive device within 7 day(s). 5.  Patient will ascend/descend 6 stairs with 1 handrail(s) with minimal assistance/contact guard assist within 7 day(s). Outcome: Progressing Towards Goal    PHYSICAL THERAPY EVALUATION  Patient: Jennifer Donahue (61 y.o. female)  Date: 5/3/2021  Primary Diagnosis: Debility [R53.81]        Precautions: Fall       ASSESSMENT  Based on the objective data described below, the patient presents with decreased activity tolerance, impaired static and dynamic standing balance, generalized weakness and presents at a high risk for falls. Per pt, prior to admission, she was Mod I/supervision level for all mobility with intermittent use of RW vs cane, however, she has experienced several falls over the last several weeks. She was receiving care from in home caregivers \"during the day every day\" but reports that she was ambulating and bathing alone most mornings. Currently, pt is requiring standby assist for bed mobility and min A steadying support for transfers with RW.  Today, pt ambulated 18ft x 2 reps with RW and required Min A and max cues to maintain ANIRUDH within RW as she had a tendency to translate RW too far anterior. Pt left sitting up in recliner chair with all needs in reach. At this time, pt required 24/7 supervision and assistance d/t high risk of falls. Should 24/7 not be available, rec SNF placement for continued therapy. Current Level of Function Impacting Discharge (mobility/balance): Min A for transfers and short distance gait with RW    Functional Outcome Measure: The patient scored 60/100 on the Barthel Index outcome measure which is indicative of 40% functional impairment. Other factors to consider for discharge: Pt lives alone. Will require 24/7 assist     Patient will benefit from skilled therapy intervention to address the above noted impairments. PLAN :  Recommendations and Planned Interventions: bed mobility training, transfer training, gait training, therapeutic exercises, patient and family training/education, and therapeutic activities      Frequency/Duration: Patient will be followed by physical therapy:  5 times a week to address goals. Recommendation for discharge: (in order for the patient to meet his/her long term goals)  Therapy up to 5 days/week in SNF setting or intensive home health therapy program with 24/7 supervision and assistance    This discharge recommendation:  Has been made in collaboration with the attending provider and/or case management    IF patient discharges home will need the following DME: patient owns DME required for discharge         SUBJECTIVE:   Patient stated I'm really wanting to get out of here.     OBJECTIVE DATA SUMMARY:   HISTORY:    Past Medical History:   Diagnosis Date    Abnormal urinalysis 5/15/2019    Anemia     Breast cancer ([facility] [address]     Cancer (HCC)     left breast    Cataract     Chronic renal failure     Colonic polyp     Dementia (HCC)     Diabetes (HCC)     Eczema     Headache     Hyperlipemia     Hypothyroidism     Ill-defined condition     high cholesterol, hypothyroid    Osteopenia     Posterior vitreous detachment, left     Type 2 diabetes mellitus ([address]     UTI (urinary tract infection)     Vitamin D deficiency      Past Surgical History:   Procedure Laterality Date    HX APPENDECTOMY      HX CATARACT REMOVAL      HX COLONOSCOPY  2009    HX GYN      hysterectomy    HX MASTECTOMY  1992    S/P Left breast ,edial segmental mastectomy- 1992;    HX ORTHOPAEDIC      back    DE BREAST SURGERY PROCEDURE UNLISTED      left breast lumpectomy       Personal factors and/or comorbidities impacting plan of care:     Home Situation  Home Environment: Private residence  # Steps to Enter: 6  Rails to Enter: Yes  Hand Rails : Right  One/Two Story Residence: One story  Living Alone: Yes  Support Systems: Family member(s)(caregivers daily )  Patient Expects to be Discharged to[de-identified] Private residence  Current DME Used/Available at Home: Wheelchair, Walker, rolling, Shower chair, Cane, straight, Grab bars  Tub or Shower Type: Shower    EXAMINATION/PRESENTATION/DECISION MAKING:   Critical Behavior:  Neurologic State: Alert           Hearing: Auditory  Auditory Impairment: Hard of hearing, bilateral  Skin:  Defer to RN notes  Edema: Defer to RN notes  Range Of Motion:  AROM: Generally decreased, functional           PROM: Within functional limits           Strength:    Strength: Generally decreased, functional                    Tone & Sensation:   Tone: Normal              Sensation: Intact               Coordination:  Coordination: Generally decreased, functional  Vision:      Functional Mobility:  Bed Mobility:  Rolling: Supervision  Supine to Sit: Supervision  Sit to Supine: Supervision  Scooting: Supervision  Transfers:  Sit to Stand: Minimum assistance  Stand to Sit: Minimum assistance                       Balance:   Sitting: Intact  Standing: Impaired  Standing - Static: Constant support; Fair  Standing - Dynamic : Fair;Constant support  Ambulation/Gait Training:  Distance (ft): 18 Feet (ft)(x 2 reps)  Assistive Device: Walker, rolling;Gait belt  Ambulation - Level of Assistance: Minimal assistance     Gait Description (WDL): Exceptions to WDL  Gait Abnormalities: Shuffling gait; Decreased step clearance(increased trunk flexion)              Speed/Marah: Slow;Shuffled  Step Length: Right shortened;Left shortened           Functional Measure:  Barthel Index:    Bathin  Bladder: 5  Bowels: 10  Groomin  Dressing: 10  Feeding: 10  Mobility: 5  Stairs: 0  Toilet Use: 5  Transfer (Bed to Chair and Back): 10  Total: 60/100       The Barthel ADL Index: Guidelines  1. The index should be used as a record of what a patient does, not as a record of what a patient could do. 2. The main aim is to establish degree of independence from any help, physical or verbal, however minor and for whatever reason. 3. The need for supervision renders the patient not independent. 4. A patient's performance should be established using the best available evidence. Asking the patient, friends/relatives and nurses are the usual sources, but direct observation and common sense are also important. However direct testing is not needed. 5. Usually the patient's performance over the preceding 24-48 hours is important, but occasionally longer periods will be relevant. 6. Middle categories imply that the patient supplies over 50 per cent of the effort. 7. Use of aids to be independent is allowed. Reema Young., Barthel, DBlayneW. (3065). Functional evaluation: the Barthel Index. [address] [facility] (14)2. Albertina Corral joi BOB Lopez, Jeanne Bueno., Ramirez Mcdaniel., [place], [address] [place] [address] (). Measuring the change indisability after inpatient rehabilitation; comparison of the responsiveness of the Barthel Index and Functional [place] Measure. Journal of Neurology, Neurosurgery, and Psychiatry, 66(4), 629-158.   SCOTT Chavez, BERNARDINO Real, Callie Mott, M.A. (2004.) Assessment of post-stroke quality of life in cost-effectiveness studies: The usefulness of the Barthel Index and the EuroQoL-5D. Quality of Life Research, 15, 955-40           Physical Therapy Evaluation Charge Determination   History Examination Presentation Decision-Making   MEDIUM  Complexity : 1-2 comorbidities / personal factors will impact the outcome/ POC  LOW Complexity : 1-2 Standardized tests and measures addressing body structure, function, activity limitation and / or participation in recreation  LOW Complexity : Stable, uncomplicated  LOW Complexity : FOTO score of       Based on the above components, the patient evaluation is determined to be of the following complexity level: LOW     Pain Ratin/10 R hip pain    Activity Tolerance:   Fair and requires rest breaks    After treatment patient left in no apparent distress:   Sitting in chair, Call bell within reach, and Bed / chair alarm activated    COMMUNICATION/EDUCATION:   The patients plan of care was discussed with: Occupational therapist and Registered nurse. Fall prevention education was provided and the patient/caregiver indicated understanding., Patient/family have participated as able in goal setting and plan of care. , and Patient/family agree to work toward stated goals and plan of care.     Thank you for this referral.  Isael Dunn PT, DPT   Time Calculation: 24 mins

## 2021-05-03 NOTE — PROGRESS NOTES
Pharmacy Dosing Services: 5/3/21    Consult for Enoxaparin by Dr. Bibi Crhistianson made for this 80 y.o. female, for prophylaxis of  VTE. Wt Readings from Last 1 Encounters:   05/03/21 52.4 kg (115 lb 8.3 oz)       Ht Readings from Last 1 Encounters:   01/15/21 165.1 cm (65\")             Previous Dose Enoxaparin 40mg daily     Creatinine Clearance CrCl cannot be calculated (Unknown ideal weight.). Creatinine Lab Results   Component Value Date/Time    Creatinine (POC) 1.4 (H) 08/08/2017 10:39 AM    Creatinine 1.45 (H) 05/03/2021 06:35 AM         Platelet Lab Results   Component Value Date/Time    PLATELET 257 80/57/5276 06:35 AM        H/H Lab Results   Component Value Date/Time    HGB 7.3 (L) 05/03/2021 06:35 AM          Pharmacist made change to enoxaparin therapy based on:  [ X  ] Renal function: dose changed to: enoxaparin 30mg daily for CrCl ~18mL/min, if renal function worsens recommend heparin   [ ] BMI: dose changed to:    Pharmacy to automatically make dose adjustment for renal dysfunction (creatinine clearance less than 30 mL/min)  Pharmacy to automatically make dose adjustment for obesity (BMI greater than 40)  Pharmacy to make dose rounding adjustments per [facility] dose adjustment scale. Pharmacy to monitor patients progress. Will make dose adjustment as needed per changing renal function. Will communicate further recommendations regarding patients anticoagulation therapy with prescriber. Signed Jenn Prado .  Contact information: [phone]

## 2021-05-04 PROBLEM — Z71.89 GOALS OF CARE, COUNSELING/DISCUSSION: Status: ACTIVE | Noted: 2021-05-04

## 2021-05-04 LAB
SARS-COV-2, XPLCVT: NOT DETECTED
SOURCE, COVRS: NORMAL

## 2021-05-04 PROCEDURE — 99232 SBSQ HOSP IP/OBS MODERATE 35: CPT | Performed by: FAMILY MEDICINE

## 2021-05-04 PROCEDURE — 74011250637 HC RX REV CODE- 250/637: Performed by: INTERNAL MEDICINE

## 2021-05-04 PROCEDURE — 94760 N-INVAS EAR/PLS OXIMETRY 1: CPT

## 2021-05-04 PROCEDURE — 74011250637 HC RX REV CODE- 250/637: Performed by: STUDENT IN AN ORGANIZED HEALTH CARE EDUCATION/TRAINING PROGRAM

## 2021-05-04 PROCEDURE — 65270000029 HC RM PRIVATE

## 2021-05-04 RX ADMIN — DOCUSATE SODIUM 100 MG: 100 CAPSULE, LIQUID FILLED ORAL at 16:44

## 2021-05-04 RX ADMIN — ACETAMINOPHEN 650 MG: 325 TABLET ORAL at 13:59

## 2021-05-04 RX ADMIN — LEVOTHYROXINE SODIUM 50 MCG: 0.05 TABLET ORAL at 07:33

## 2021-05-04 RX ADMIN — MIDODRINE HYDROCHLORIDE 2.5 MG: 5 TABLET ORAL at 07:33

## 2021-05-04 RX ADMIN — FERROUS SULFATE TAB 325 MG (65 MG ELEMENTAL FE) 325 MG: 325 (65 FE) TAB at 21:16

## 2021-05-04 RX ADMIN — PANTOPRAZOLE SODIUM 40 MG: 40 TABLET, DELAYED RELEASE ORAL at 07:33

## 2021-05-04 RX ADMIN — ACETAMINOPHEN 650 MG: 325 TABLET ORAL at 07:33

## 2021-05-04 RX ADMIN — ACETAMINOPHEN 650 MG: 325 TABLET ORAL at 21:16

## 2021-05-04 RX ADMIN — DOCUSATE SODIUM 100 MG: 100 CAPSULE, LIQUID FILLED ORAL at 07:33

## 2021-05-04 RX ADMIN — FERROUS SULFATE TAB 325 MG (65 MG ELEMENTAL FE) 325 MG: 325 (65 FE) TAB at 07:33

## 2021-05-04 NOTE — PROGRESS NOTES
5/4/2021   CARE MANAGEMENT NOTE:  CM reviewed EMR for clinical updates. Pt was admitted with debility; also DM, colon mass, and dementia. Reportedly, pt resides alone. Mainor Buenrostro ([phone])  Claudette Washburn ([phone]) is another primary decision maker. RUR 17%    Transition Plan of Care:  1. Palliative following for goals of care. 2.  [facility] they are unable to staff home hospice in pt's locality. A referrals was made to [facility] and await info session with pt and family    CM will continue to follow pt for hospice.   Yves

## 2021-05-04 NOTE — PROGRESS NOTES
Physical therapy    836 chart reviewed, spoke with RN. Pt transitioning to comfort measures. Will complete PT order at this time.     Kerry Chandra

## 2021-05-04 NOTE — PROGRESS NOTES
Bedside and Verbal shift change report given to Dorene Kocher (oncoming nurse) by Dani Herrera (offgoing nurse). Report included the following information SBAR, Kardex and MAR.

## 2021-05-04 NOTE — PROGRESS NOTES
Spiritual Care Assessment/Progress Note  [address]      NAME: Rob Blair      MRN: [id number]  AGE: 80 y.o. SEX: female  Anabaptism Affiliation: Muslim   Language: English     5/4/2021     Total Time (in minutes): 13     Spiritual Assessment begun in [facility]  MED SURG 2 through conversation with:         [x]Patient        [] Family    [] Friend(s)        Reason for Consult: Palliative Care, Initial/Spiritual Assessment     Spiritual beliefs: (Please include comment if needed)     [x] Identifies with a janice tradition:         [] Supported by a janice community:            [] Claims no spiritual orientation:           [] Seeking spiritual identity:                [] Adheres to an individual form of spirituality:           [] Not able to assess:                           Identified resources for coping:      [x] Prayer                               [] Music                  [] Guided Imagery     [x] Family/friends                 [] Pet visits     [] Devotional reading                         [] Unknown     [] Other:                                              Interventions offered during this visit: (See comments for more details)    Patient Interventions: Prayer (actual), End of life issues discussed, Catharsis/review of pertinent events in supportive environment           Plan of Care:     [] Support spiritual and/or cultural needs    [] Support AMD and/or advance care planning process      [] Support grieving process   [] Coordinate Rites and/or Rituals    [] Coordination with community clergy   [] No spiritual needs identified at this time   [] Detailed Plan of Care below (See Comments)  [] Make referral to Music Therapy  [] Make referral to Pet Therapy     [] Make referral to Addiction services  [] Make referral to [facility]  [] Make referral to Spiritual Care Partner  [] No future visits requested        [x] Follow up upon further referrals     Comments:  visited Mrs. Jakub Foy for a palliative care initial spiritual assessment on the [facility]. Mrs. Nicol Hutchins was awake, alert, and sitting up in her recliner when the  came into the room. She greeted the  warmly and requested  assist her in contact her nurse.  provided hospitality. Mrs. Nicol Hutchins briefly spoke about her end-of-life and requested  provide prayer for her to be received into God's kingdom, when that time came.  lifted her request in prayer and Mrs. Nicol Hutchins added her own prayers of thanksgiving as well. She thanked the  for stopping by and is aware of the 's availability. 's are available for further support upon referral  Janes Paez. Jemal Alvarado.      Paging Service: [phone])

## 2021-05-04 NOTE — PROGRESS NOTES
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 An Registered Dietitian  was consulted and evaluated this patient on 2021. BMI documented 19.22      If possible, please document in progress notes and discharge summary if you are evaluating and /or treating any of the following: The medical record reflects the following:  Risk Factors: Age, dementia, neoplasm of lungs and colon on CT. history of breast cancer  Clinical Indicators: Per RD assessment on --- documented 17% weight loss in the last 4.5 months. Significant fat and muscle wasting temples, hands, mild body fat loss. Treatment: RD consulted, and recommending for Ensure Enlive BID , weight, daily labs. Thank you  Farhat Forman[address]  [phone]  Options provided:  -- Protein calorie malnutrition mild  -- Protein calorie malnutrition moderate  -- Protein calorie malnutrition severe  -- Other - I will add my own diagnosis  -- Disagree - Not applicable / Not valid  -- Disagree - Clinically unable to determine / Unknown  -- Refer to Clinical Documentation Reviewer    PROVIDER RESPONSE TEXT:    This patient has moderate protein calorie malnutrition.     Query created by: Jenene Libman on 2021 10:57 AM      Electronically signed by:  Debbie El MD 2021 2:43 PM

## 2021-05-04 NOTE — PROGRESS NOTES
Chart reviewed and nurse consulted. patient transitioning to comfort care. Completing OT order.  Uziel Chambers, OTR/L

## 2021-05-04 NOTE — PROGRESS NOTES
129 [facility] FAMILY MEDICINE RESIDENCY PROGRAM  PROGRESS NOTE     5/4/2021  PCP: Goldy Triplett MD     Assessment/Plan:     Jones Gallagher is a 80 y.o. female with a PMHx of  breast cancer, DM2, L arm lymphedema, dementia, OA, hypothyroidism, GERD, orthostatic hypotension, iron deficiency, constipation, HFrEF, HLD who is admitted for progressive debility and dispo and incidentally found to have ascending colon lesion and RUE lesion concerning for malignancy. Debility: Transitioned to [address] Progressive. May be 2/2 malignancy found on imaging in right lung and colon. May be also be 2/2 R hip trauma sustained during GLF a week ago; though the malignancy and associated anemia may have caused the fall also.  - PT/OT/CM consulted for dispo  - Palliative consulted, transitioned to [address]  - Case Management Consulted for SNF  - Case Management Consulted for Hospice    Right shoulder pain: S/p trauma sustained during GLF 1 week ago. R shoulder xray with no acute process    Ascending colon mass: Found incidentally on CT pelvis. If neoplasm, it may be the source of the possible met in the lung as well. S/p Zosyn (5/3)  - BCx NGTD 17hrs  - Palliative consulted, appreciate recs    Anemia: HGB 7.6. (at baseline). Chronic 2/2 anemia of chronic disease likely 2/2 malignant process. Morning CBC stable  - Continue home iron 325 every day    Constipation: At home on colace 100 BID and miralax PRN  - Continue home meds    Dementia: Stable. No home meds    GERD: At home on prilosec 20 every day  - Substitute for protonix 40 per formulary    DM2: Patient not on any home meds. - Comfort Measures only    Hypothyroidism: Patient at home on levothyroxine 50 every day.  Last TSH was 2.45 (wnl) on 9/21/20  - Continue home levothyroxine 50    HLD: At home on lipitor 40  - Hold home lipitor 40     Orthostatic hypotension: Recently started on midodrine 2.5 every day  - Continue home midodrine 2.5 every day    L arm lymphedema: Chronic. 2/2 breast cancer s/p resection >10 years ago. Hyperkalemia: 5.2 POA. Possibly secondary to GI bleed    HFrEF: Last echo 2020 with EF 35-40%. Patient on bumex 1 PRN  - Caution with fluids  - Continue bumex 1 PRN    FEN/GI - Regular diet. Activity - Ambulate as tolerated  DVT prophylaxis - Comfort Measures only  GI prophylaxis - Protonix  Fall prophylaxis - Fall precautions ordered. Disposition - Plan to d/c to TBD. Consulting PT, OT and CM  Code Status - DNR. Discussed with Patient  Next of [facility] Name and Contact - Mario Sánchez,  Mainor - [phone]    Patient Jessie Pineda will be discussed with Dr. Ce Sauceda. Subjective:   Pt was seen and examined at bedside. Denies chest pain, SOB, nausea, vomiting, diarrhea, abdominal pain, dizziness, headache, dysuria. R shoulder about the same. Objective:     Patient Vitals for the past 24 hrs:   Temp Pulse Resp BP SpO2   21 1954 98.5 °F (36.9 °C) 75 16 (!) 149/56 99 %   21 1900 98.2 °F (36.8 °C) 65 18 (!) 160/72 98 %   21 1213 97.1 °F (36.2 °C) 95 20 117/62 98 %   21 0738 97.4 °F (36.3 °C) 66 18 (!) 131/45 100 %      Temp (24hrs), Av.8 °F (36.6 °C), Min:97.1 °F (36.2 °C), Max:98.5 °F (36.9 °C)         O2 Device: None (Room air)    Date 21 - 21 0621 - 21 0659   Shift 5773-9006 7586-7098 24 Hour Total 6087-2427 2469-6344 24 Hour Total   INTAKE   P.O. 240  240        P. O. 240  240      Shift Total(mL/kg) 240(4.6)  240(4.6)      OUTPUT   Urine(mL/kg/hr) 250(0.4) 450 700        Urine Voided 250  250        Urine Occurrence(s)  1 x 1 x        Urine Output (mL) (Urinary Catheter [address])  450 450      Shift Total(mL/kg) 250(4.8) 450(8.6) 700(13.4)      NET -10 -450 -460      Weight (kg) 52.4 52.4 52.4 52.4 52.4 52.4       Physical Exam  General: No acute distress. Alert. Cooperative. Head: Normocephalic. Atraumatic. Eyes:              Conjunctiva pink. Sclera white. EOMI.   Ears:              Hearing impaired bilaterally   Nose:             Septum midline. Mucosa pink. No drainage. Throat: Mucosa pink. Moist mucous membranes. No tonsillar exudates or erythema. Palate movement equal bilaterally. Neck: Supple. Normal ROM. No stiffness. Respiratory: CTAB. No w/r/r/c.   Cardiovascular: RRR. Normal S1,S2. No m/r/g.   GI: + bowel sounds. Nontender. No rebound tenderness or guarding. Nondistended. Extremities: No LE edema. Distal pulses intact. LUE nonpitting edema present. This is patients baseline. No swelling or erythema on right hip. Right hip TTP   Musculoskeletal: Full ROM in all extremities. Skin: Warm, dry. No rashes. Neuro: No focal deficits noted. No sensory deficits in RLE          Data Review:     CBC:  Recent Labs     05/03/21  0635 05/02/21  1645   WBC 8.5 9.1   HGB 7.3* 7.6*   HCT 24.5* 25.2*    774*     Metabolic Panel:  Recent Labs     05/03/21  0635 05/02/21  1645    145   K 4.6 5.2*   * 109*   CO2 27 26   BUN 19 20   CREA 1.45* 1.69*   GLU 94 95   CA 8.2* 8.9     Micro:  Lab Results   Component Value Date/Time    Culture result: NO GROWTH AFTER 17 HOURS 05/03/2021 06:35 AM    Culture result: NO GROWTH 2 DAYS 05/15/2019 04:15 PM     Imaging:  Xr Hip Rt W Or Wo Pelv 2-3 Vws    Result Date: 5/2/2021  EXAM: XR HIP RT W OR WO PELV 2-3 VWS INDICATION: fall with R hip pain. COMPARISON: None. FINDINGS: AP view of the pelvis and a frogleg lateral view of the right hip demonstrate no fracture, dislocation or other acute abnormality. Degenerative changes of the spine. No acute abnormality. Ct Head Wo Cont    Result Date: 5/2/2021  EXAM: CT HEAD WO CONT INDICATION: fall COMPARISON: August 6. CONTRAST: None. TECHNIQUE: Unenhanced CT of the head was performed using 5 mm images. Brain and bone windows were generated. Coronal and sagittal reformats.  CT dose reduction was achieved through use of a standardized protocol tailored for this examination and automatic exposure control for dose modulation. FINDINGS: The ventricles and sulci are enlarged. There are bilateral basal ganglia calcifications. There are vertebral artery calcifications There is no significant white matter disease. There is no intracranial hemorrhage, extra-axial collection, or mass effect. The basilar cisterns are open. No CT evidence of acute infarct. The bone windows demonstrate no abnormalities. The visualized portions of the paranasal sinuses and mastoid air cells are clear. No acute findings. Ct Spine Cerv Wo Cont    Result Date: 5/2/2021  EXAM:  CT CERVICAL SPINE WITHOUT CONTRAST INDICATION: fall w neck pain. COMPARISON: August 6, 2020 CONTRAST:  None. TECHNIQUE: Multislice helical CT of the cervical spine was performed without intravenous contrast administration. Sagittal and coronal reformats were generated. CT dose reduction was achieved through use of a standardized protocol tailored for this examination and automatic exposure control for dose modulation. FINDINGS: The alignment is within normal limits. There is no fracture or compression deformity. The odontoid process is intact. The craniocervical junction is within normal limits. Calcification of the atlantoaxial ligament. Spiculated 2.4 cm mass in the right upper lobe. The incidentally imaged soft tissues are within normal limits. C2-C3: Bilateral facet arthropathy. Disc space narrowing. Significant right neural foraminal narrowing. C3-C4: There is no spinal canal or neural foraminal stenosis. C4-C5: Bilateral facet arthropathy. C5-C6: Disc space narrowing. Uncinate process hypertrophy with left neural foraminal narrowing. C6-C7: There is no spinal canal or neural foraminal stenosis. C7-T1: There is no spinal canal or neural foraminal stenosis. 1. Multilevel degenerative changes without fracture. 2. Right upper lobe mass. CT of the thorax recommended.     Ct Pelv Wo Cont    Result Date: 5/2/2021  *PRELIMINARY REPORT* CT of the pelvis demonstrates no fracture. Preliminary report was provided by Dr. Dwaine Cooper, the on-call radiologist, at 4:46 PM Final report to follow. *END PRELIMINARY REPORT* *FINAL REPORT BELO* EXAM: CT PELV WO CONT INDICATION: Right hip pain after fall, difficulty walking. COMPARISON: CT pelvis on 8/23/9090 TECHNIQUE: Helical CT of the bony pelvis with coronal and sagittal reformats. Images reviewed in soft tissue and bone windows. CT dose reduction was achieved through the use of a standardized protocol tailored for this examination and automatic exposure control for dose modulation. CONTRAST: None. FINDINGS: Bones: Bones are osteopenic. No acute fracture. No aggressive bone lesion. Joint fluid: None. Articulations: Mild bilateral hip osteoarthritis. Lumbar spine degenerative disease is unchanged. Tendons: Bilateral hamstring origin calcific tendinosis. Muscles: No intramuscular hematoma. No focal atrophy. Soft tissue mass: None. Mural thickening of the proximal ascending colon is partially imaged. Edema is in the fat posterior to the ascending colon. 1. No fracture. 2. Ascending colitis versus neoplasm is partially imaged. Correlate with CBC. Recommend CT abdomen/pelvis with oral and IV contrast when patient can tolerate.       Medications reviewed  Current Facility-Administered Medications   Medication Dose Route Frequency    acetaminophen (TYLENOL) tablet 650 mg  650 mg Oral TID    promethazine (PHENERGAN) tablet 12.5 mg  12.5 mg Oral Q6H PRN    Or    ondansetron (ZOFRAN) injection 4 mg  4 mg IntraVENous Q6H PRN    bumetanide (BUMEX) tablet 1 mg  1 mg Oral PRN    docusate sodium (COLACE) capsule 100 mg  100 mg Oral BID    ferrous sulfate tablet 325 mg  325 mg Oral BID    levothyroxine (SYNTHROID) tablet 50 mcg  50 mcg Oral DAILY    midodrine (PROAMATINE) tablet 2.5 mg  2.5 mg Oral DAILY    pantoprazole (PROTONIX) tablet 40 mg  40 mg Oral ACB    polyethylene glycol (MIRALAX) packet 17 g  17 g Oral BID PRN         Signed:   Kathleen White MD   Resident, Family Medicine      Attending note: Attending note to follow. ..

## 2021-05-05 PROBLEM — R26.9 GAIT ABNORMALITY: Status: ACTIVE | Noted: 2021-05-05

## 2021-05-05 PROBLEM — S16.1XXA CERVICAL STRAIN: Status: ACTIVE | Noted: 2021-05-05

## 2021-05-05 PROCEDURE — 99232 SBSQ HOSP IP/OBS MODERATE 35: CPT | Performed by: FAMILY MEDICINE

## 2021-05-05 PROCEDURE — 74011250637 HC RX REV CODE- 250/637: Performed by: INTERNAL MEDICINE

## 2021-05-05 PROCEDURE — 94760 N-INVAS EAR/PLS OXIMETRY 1: CPT

## 2021-05-05 PROCEDURE — 74011000250 HC RX REV CODE- 250: Performed by: STUDENT IN AN ORGANIZED HEALTH CARE EDUCATION/TRAINING PROGRAM

## 2021-05-05 PROCEDURE — 65270000029 HC RM PRIVATE

## 2021-05-05 PROCEDURE — 74011250637 HC RX REV CODE- 250/637: Performed by: STUDENT IN AN ORGANIZED HEALTH CARE EDUCATION/TRAINING PROGRAM

## 2021-05-05 RX ORDER — LIDOCAINE 4 G/100G
1 PATCH TOPICAL EVERY 24 HOURS
Status: DISCONTINUED | OUTPATIENT
Start: 2021-05-05 | End: 2021-05-06

## 2021-05-05 RX ORDER — DICLOFENAC SODIUM 10 MG/G
2 GEL TOPICAL 4 TIMES DAILY
Status: DISCONTINUED | OUTPATIENT
Start: 2021-05-05 | End: 2021-05-05

## 2021-05-05 RX ADMIN — ACETAMINOPHEN 650 MG: 325 TABLET ORAL at 10:56

## 2021-05-05 RX ADMIN — FERROUS SULFATE TAB 325 MG (65 MG ELEMENTAL FE) 325 MG: 325 (65 FE) TAB at 20:34

## 2021-05-05 RX ADMIN — LEVOTHYROXINE SODIUM 50 MCG: 0.05 TABLET ORAL at 10:56

## 2021-05-05 RX ADMIN — DOCUSATE SODIUM 100 MG: 100 CAPSULE, LIQUID FILLED ORAL at 18:50

## 2021-05-05 RX ADMIN — FERROUS SULFATE TAB 325 MG (65 MG ELEMENTAL FE) 325 MG: 325 (65 FE) TAB at 10:56

## 2021-05-05 RX ADMIN — PANTOPRAZOLE SODIUM 40 MG: 40 TABLET, DELAYED RELEASE ORAL at 06:42

## 2021-05-05 RX ADMIN — ACETAMINOPHEN 650 MG: 325 TABLET ORAL at 18:50

## 2021-05-05 RX ADMIN — DOCUSATE SODIUM 100 MG: 100 CAPSULE, LIQUID FILLED ORAL at 10:56

## 2021-05-05 RX ADMIN — MIDODRINE HYDROCHLORIDE 2.5 MG: 5 TABLET ORAL at 10:55

## 2021-05-05 NOTE — PROGRESS NOTES
5/5/2021   CARE MANAGEMENT NOTE:  CM reviewed EMR for clinical updates. Pt was admitted with debility; also DM, colon mass, and dementia. Reportedly, pt resides alone. Mainor Lawrence ([phone]) Janet Palacios ([phone]) is another primary decision maker. Nathalie Law ([phone]) is also a contact. 
  
RUR 17%; LOS 2 days 
  
Transition Plan of Care: 1. Pt is comfort care 2. [facility] CAM liaison Elizabeth Xbizc - [phone]) was finally able to contact family today. Reportedly, pt's god-daughter Joy Brooks will come to the hospital today and meet with hospice. At this writing, the plan is for pt to discharge to a nursing home with admission to hospice. Also according to hospice liaison, pt has a long term care policy. 3.  [facility] will likely be the mode of transport upon discharge. CM will continue to follow pt for LTC with admission to [facility]

## 2021-05-05 NOTE — PROGRESS NOTES
[address] [facility] FAMILY MEDICINE RESIDENCY PROGRAM  PROGRESS NOTE     5/5/2021  PCP: Greg Thomson MD     Assessment/Plan:     Stefanie Joel is a 80 y.o. female with a PMHx of  breast cancer, DM2, L arm lymphedema, dementia, OA, hypothyroidism, GERD, orthostatic hypotension, iron deficiency, constipation, HFrEF, HLD who is admitted for progressive debility and dispo and incidentally found to have ascending colon lesion and RUE lesion concerning for malignancy. Debility: Transitioned to [address] Progressive. likely 2/2 malignancy of the right lung and colon detected on imaging this admission (see below). Possibly 2/2 R hip trauma sustained during GLF a week ago.  - PT/OT/CM consulted for dispo  - Palliative consulted, transitioned to [address]  - Case Management Consulted for hospice arrangements - denied from [address]. Referral made to [facility], awaiting response. Referrals also to be sent to SNFs    Right shoulder/hip pain: S/p trauma sustained during GLF 1 week ago. R shoulder and hip xrays with no acute abnormality.  - Tylenol TID   - Lidocaine patch R hip and shoulder    Ascending colon mass: Found incidentally on CT pelvis. If neoplasm, it may be the source of the possible met in the lung as well. S/p Zosyn (5/3)  - BCx NGTD  - Palliative consulted, appreciate recs    Anemia: POA Hgb 7.6 (BL 7-9). Chronic 2/2 anemia of chronic disease likely 2/2 malignant process. - Continue home iron 325 every day    Constipation: Home colace 100 BID and miralax PRN  - Continue home meds    Dementia: Stable. No home meds    GERD: At home on prilosec 20 every day  - Substitute for protonix 40 per formulary    DM2: Patient not on any home meds. - Comfort Measures only    Hypothyroidism: Patient at home on levothyroxine 50 every day.  Last TSH was 2.45 (wnl) on 9/21/20  - Continue home levothyroxine 50    HLD: At home on lipitor 40  - Hold home lipitor 40     Orthostatic hypotension: Recently started on midodrine 2.5 every day  - Continue home midodrine 2.5 every day    L arm lymphedema: Chronic. 2/2 breast cancer s/p resection >10 years ago. Hyperkalemia: 5.2 POA. Possibly secondary to GI bleed    HFrEF: Last echo 2020 with EF 35-40%. Patient on bumex 1 PRN  - Caution with fluids  - Continue bumex 1 PRN    FEN/GI - Regular diet. Activity - Ambulate as tolerated  DVT prophylaxis - Comfort Measures only  GI prophylaxis - Protonix  Fall prophylaxis - Fall precautions ordered. Disposition - Plan to d/c to TBD. Consulting PT, OT and CM  Code Status - DNR. Discussed with Patient  Next of [facility] Name and Contact - Kris Seip, Niece - [phone]    Patient Khushbu Wood will be discussed with Dr. Debra Brooks. Subjective:   Pt was seen and examined at bedside. Complaining of right shoulder and hip pain. Objective:     Patient Vitals for the past 24 hrs:   Temp Pulse Resp BP SpO2   21 0735 98.5 °F (36.9 °C) 64 16 (!) 129/55 96 %   21 0300 98 °F (36.7 °C) 64 17 (!) 135/59 99 %   21 2316 98.1 °F (36.7 °C) 63 18 (!) 142/60 100 %   21 0936 98.1 °F (36.7 °C) 62 18 (!) 148/68 100 %      Temp (24hrs), Av.2 °F (36.8 °C), Min:98 °F (36.7 °C), Max:98.5 °F (36.9 °C)         O2 Device: None (Room air)    Date 21 - 21 0621 - 21 0659   Shift 8802-6845 8921-1256 24 Hour Total 7968-7332 8640-3610 24 Hour Total   INTAKE   P.O. 480  480        P. O. 480  480      Shift Total(mL/kg) 480(9.2)  480(9.2)      OUTPUT   Urine(mL/kg/hr) 350(0.6) 700(1.1) 1050(0.8)        Urine Output (mL) (Urinary Catheter [address])       Emesis/NG output           Emesis Occurrence(s)  0 x 0 x      Stool           Stool Occurrence(s) 2 x 0 x 2 x      Shift Total(mL/kg) 350(6.7) 700(13.4) 1050(20)       -700 -570      Weight (kg) 52.4 52.4 52.4 52.4 52.4 52.4       Physical Exam  General: No acute distress. Alert. Cooperative. Head: Normocephalic. Atraumatic. Eyes:              Conjunctiva pink. Sclera white. Neck: Supple. Normal ROM. No stiffness. Respiratory: CTAB. No w/r/r/c.   Cardiovascular: RRR. Normal S1,S2. No m/r/g.   GI: + bowel sounds. Nontender. No rebound tenderness or guarding. Nondistended. Extremities: No LE edema. Distal pulses intact. LUE nonpitting edema present (baseline). Right hip and shoulder TTP, no swelling or erythema     Skin: Warm, dry. No rashes. Neuro: No focal deficits noted. Data Review:     CBC:  Recent Labs     05/03/21  0635 05/02/21  1645   WBC 8.5 9.1   HGB 7.3* 7.6*   HCT 24.5* 25.2*    951*     Metabolic Panel:  Recent Labs     05/03/21  0635 05/02/21  1645    145   K 4.6 5.2*   * 109*   CO2 27 26   BUN 19 20   CREA 1.45* 1.69*   GLU 94 95   CA 8.2* 8.9     Micro:  Lab Results   Component Value Date/Time    Culture result: NO GROWTH 2 DAYS 05/03/2021 06:35 AM    Culture result: NO GROWTH 2 DAYS 05/15/2019 04:15 PM     Imaging:  Xr Hip Rt W Or Wo Pelv 2-3 Vws    Result Date: 5/2/2021  EXAM: XR HIP RT W OR WO PELV 2-3 VWS INDICATION: fall with R hip pain. COMPARISON: None. FINDINGS: AP view of the pelvis and a frogleg lateral view of the right hip demonstrate no fracture, dislocation or other acute abnormality. Degenerative changes of the spine. No acute abnormality. Ct Head Wo Cont    Result Date: 5/2/2021  EXAM: CT HEAD WO CONT INDICATION: fall COMPARISON: August 6. CONTRAST: None. TECHNIQUE: Unenhanced CT of the head was performed using 5 mm images. Brain and bone windows were generated. Coronal and sagittal reformats. CT dose reduction was achieved through use of a standardized protocol tailored for this examination and automatic exposure control for dose modulation. FINDINGS: The ventricles and sulci are enlarged. There are bilateral basal ganglia calcifications.  There are vertebral artery calcifications There is no significant white matter disease. There is no intracranial hemorrhage, extra-axial collection, or mass effect. The basilar cisterns are open. No CT evidence of acute infarct. The bone windows demonstrate no abnormalities. The visualized portions of the paranasal sinuses and mastoid air cells are clear. No acute findings. Ct Spine Cerv Wo Cont    Result Date: 5/2/2021  EXAM:  CT CERVICAL SPINE WITHOUT CONTRAST INDICATION: fall w neck pain. COMPARISON: August 6, 2020 CONTRAST:  None. TECHNIQUE: Multislice helical CT of the cervical spine was performed without intravenous contrast administration. Sagittal and coronal reformats were generated. CT dose reduction was achieved through use of a standardized protocol tailored for this examination and automatic exposure control for dose modulation. FINDINGS: The alignment is within normal limits. There is no fracture or compression deformity. The odontoid process is intact. The craniocervical junction is within normal limits. Calcification of the atlantoaxial ligament. Spiculated 2.4 cm mass in the right upper lobe. The incidentally imaged soft tissues are within normal limits. C2-C3: Bilateral facet arthropathy. Disc space narrowing. Significant right neural foraminal narrowing. C3-C4: There is no spinal canal or neural foraminal stenosis. C4-C5: Bilateral facet arthropathy. C5-C6: Disc space narrowing. Uncinate process hypertrophy with left neural foraminal narrowing. C6-C7: There is no spinal canal or neural foraminal stenosis. C7-T1: There is no spinal canal or neural foraminal stenosis. 1. Multilevel degenerative changes without fracture. 2. Right upper lobe mass. CT of the thorax recommended. Ct Pelv Wo Cont    Result Date: 5/2/2021  *PRELIMINARY REPORT* CT of the pelvis demonstrates no fracture. Preliminary report was provided by Dr. Too Harry, the on-call radiologist, at 4:46 PM Final report to follow.  *END PRELIMINARY REPORT* *FINAL REPORT BELO* EXAM: CT PELV WO CONT INDICATION: Right hip pain after fall, difficulty walking. COMPARISON: CT pelvis on 4/39/3945 TECHNIQUE: Helical CT of the bony pelvis with coronal and sagittal reformats. Images reviewed in soft tissue and bone windows. CT dose reduction was achieved through the use of a standardized protocol tailored for this examination and automatic exposure control for dose modulation. CONTRAST: None. FINDINGS: Bones: Bones are osteopenic. No acute fracture. No aggressive bone lesion. Joint fluid: None. Articulations: Mild bilateral hip osteoarthritis. Lumbar spine degenerative disease is unchanged. Tendons: Bilateral hamstring origin calcific tendinosis. Muscles: No intramuscular hematoma. No focal atrophy. Soft tissue mass: None. Mural thickening of the proximal ascending colon is partially imaged. Edema is in the fat posterior to the ascending colon. 1. No fracture. 2. Ascending colitis versus neoplasm is partially imaged. Correlate with CBC. Recommend CT abdomen/pelvis with oral and IV contrast when patient can tolerate.       Medications reviewed  Current Facility-Administered Medications   Medication Dose Route Frequency    diclofenac (VOLTAREN) 1 % topical gel 2 g  2 g Topical QID    lidocaine 4 % patch 1 Patch  1 Patch TransDERmal Q24H    acetaminophen (TYLENOL) tablet 650 mg  650 mg Oral TID    promethazine (PHENERGAN) tablet 12.5 mg  12.5 mg Oral Q6H PRN    Or    ondansetron (ZOFRAN) injection 4 mg  4 mg IntraVENous Q6H PRN    bumetanide (BUMEX) tablet 1 mg  1 mg Oral PRN    docusate sodium (COLACE) capsule 100 mg  100 mg Oral BID    ferrous sulfate tablet 325 mg  325 mg Oral BID    levothyroxine (SYNTHROID) tablet 50 mcg  50 mcg Oral DAILY    midodrine (PROAMATINE) tablet 2.5 mg  2.5 mg Oral DAILY    pantoprazole (PROTONIX) tablet 40 mg  40 mg Oral ACB    polyethylene glycol (MIRALAX) packet 17 g  17 g Oral BID PRN         Signed:   Tiffanie Montes De Oca DO   Resident, Family Medicine      Attending note: Attending note to follow. ..

## 2021-05-05 NOTE — PROGRESS NOTES
Bedside shift change report given to Darian Maldonado RN (oncoming nurse) by BREONNA GAMEZ (offgoing nurse). Report included the following information SBAR, Kardex, Intake/Output, MAR, Accordion and Recent Results.

## 2021-05-05 NOTE — DISCHARGE SUMMARY
[address] [address] Office [phone] Fax [phone] Discharge / Transfer / Off-Service Note Name: Santino Cope MRN: [id number]  Sex: Female YOB: 1925  Age: 80 y.o. PCP: Iesha Yadav MD  
 
Date of admission: 5/2/2021 Date of discharge/transfer: 5/6/2021 Attending physician at admission: Yung Schmidt MD  
 
Attending physician at discharge/transfer: Dr. Tatum Bland Resident physician at discharge/transfer: Diana Martinez DO Consultants during hospitalization IP CONSULT TO PALLIATIVE CARE - PROVIDER Admission diagnoses Debility [R53.81] Recommended follow-up after discharge 1. PCP: Iesha Yadav MD 
 
MEDICATION CHANGES 
- STOP Lipitor daily 
- STOP iron daily - START lidocaine patch on right shoulder daily as needed - START tylenol 650mg three times a day HOSPITAL COURSE Santino Cope is a 80 y.o. female with a PMHx of  breast cancer, DM2, L arm lymphedema, dementia, OA, hypothyroidism, GERD, orthostatic hypotension, iron deficiency, constipation, HFrEF, HLD who is admitted for progressive debility and dispo and incidentally found to have ascending colon lesion and RUE lesion concerning for malignancy. Palliative consulted. Patient transitioned to [address] [place] [address]. Debility: Progressive. Likely 2/2 malignancy of the right lung and colon detected on imaging during recent admission. Possibly 2/2 R hip trauma sustained during GLF a week ago. - Comfort care 
  
Right shoulder/hip pain: S/p trauma sustained during GLF 1 week ago. R shoulder and hip xrays with no acute abnormality. 
- Tylenol 650mg TID  
- Lidocaine patch on R shoulder daily prn 
  
Ascending colon mass: Found incidentally on CT pelvis. If neoplasm, it may be the source of the possible met in the lung.  
- Comfort care, no further work up  
  
Anemia: BL 7-9. Chronic 2/2 anemia of chronic disease likely 2/2 malignant process.   
- Discontinue iron Janina Ames saw your patient, Lazaro Wan, and am forwarding a copy of the visit note. Thank you for your referring him to me.  Moshe Alvarez 325mg daily with comfort care  
  
Constipation:  
- Continue colace 100mg BID and miralax prn 
  
Dementia: Stable. No home meds 
  
HFrEF: Last echo 9/2020 with EF 35-40% 
- Continue Bumex 1mg as needed for SOB 
 
GERD 
- Continue prilosec 20mg daily 
  
DM2: Patient not on any home meds. - Comfort Measures only 
  
Hypothyroidism: Last TSH was 2.45 (wnl) on 9/21/20 
- Continue levothyroxine 50mg daily 
  
HLD 
- Discontinue lipitor 40mg daily with comfort care Orthostatic hypotension: Recently started on midodrine 2.5 every day 
- Continue home midodrine 2.5 every day 
  
L arm lymphedema: Chronic. 2/2 breast cancer s/p resection >10 years ago. 
  
Hyperkalemia: Resolved. Possibly secondary to GI bleed 
 
 Physical exam at discharge: 
 
Vitals Reviewed. Visit Vitals BP (!) 118/56 (BP 1 Location: Right arm, BP Patient Position: Sitting) Pulse 64 Temp 98.4 °F (36.9 °C) Resp 17 Ht 5' 5\" (1.651 m) Wt 115 lb 8.3 oz (52.4 kg) SpO2 99% BMI 19.22 kg/m² General: No acute distress. Alert. Cooperative. Head: Normocephalic. Atraumatic. Eyes:              Conjunctiva pink. Sclera white. Neck: Supple. Normal ROM. No stiffness. Respiratory: CTAB. No w/r/r/c.  
Cardiovascular: RRR. Normal S1,S2. No m/r/g. GI: + bowel sounds. Nontender. No rebound tenderness or guarding. Nondistended. Extremities: No LE edema. Distal pulses intact. LUE nonpitting edema present (baseline). Right shoulder TTP, no swelling or erythema Skin: Warm, dry. No rashes. Neuro: No focal deficits noted.   
  
 
 
Condition at discharge: Stable Labs No results for input(s): WBC, HGB, HCT, PLT, HGBEXT, HCTEXT, PLTEXT, HGBEXT, HCTEXT, PLTEXT in the last 72 hours. No results for input(s): NA, K, CL, CO2, BUN, CREA, GLU, CA, MG, PHOS, URICA in the last 72 hours. No results for input(s): ALT, AP, TBIL, TBILI, TP, ALB, GLOB, GGT, AML, LPSE in the last 72 hours.  
 
No lab exists for component: SGOT, GPT, AMYP, HLPSE Recent Labs 05/03/21 
1620 GLUCPOC 130* Microbiology Results Procedure Component Value Units Date/Time CULTURE, BLOOD, PAIRED [[id number]] Collected: 05/03/21 3061 Order Status: Completed Specimen: Blood Updated: 05/06/21 2867 Special Requests: NO SPECIAL REQUESTS Culture result: NO GROWTH 3 DAYS Procedures / Diagnostic Studies Echo Results  (Last 48 hours) None Imaging Xr Shoulder Rt Ap/lat Min 2 V Result Date: 5/3/2021 No acute abnormality. Xr Hip Rt W Or Wo Pelv 2-3 Vws Result Date: 5/2/2021 No acute abnormality. Ct Head Wo Cont Result Date: 5/2/2021 No acute findings. Ct Chest Wo Cont Result Date: 5/3/2021 1. Ascending colon mass, involving a 9 cm segment of colon, highly suspicious for colorectal malignancy. 2. Associated pericolonic lymphadenopathy in the right lower quadrant. 3. Mild abdominal and pelvic ascites. 4. Medial right upper lobe pulmonary nodule measuring 2.4 cm, which may represent pulmonary metastatic disease or an additional primary malignancy. 5. Cholelithiasis. Ct Spine Cerv Wo Cont Result Date: 5/2/2021 1. Multilevel degenerative changes without fracture. 2. Right upper lobe mass. CT of the thorax recommended. Ct Pelv Wo Cont Result Date: 5/2/2021 1. No fracture. 2. Ascending colitis versus neoplasm is partially imaged. Correlate with CBC. Recommend CT abdomen/pelvis with oral and IV contrast when patient can tolerate. Ct Abd Pelv Wo Cont Result Date: 5/3/2021 1. Ascending colon mass, involving a 9 cm segment of colon, highly suspicious for colorectal malignancy. 2. Associated pericolonic lymphadenopathy in the right lower quadrant. 3. Mild abdominal and pelvic ascites. 4. Medial right upper lobe pulmonary nodule measuring 2.4 cm, which may represent pulmonary metastatic disease or an additional primary malignancy. 5. Cholelithiasis. Chronic diagnoses Problem List as of 5/6/2021 Date Reviewed: 1/18/2021 Codes Class Noted - Resolved Gait abnormality ICD-10-CM: R26.9 ICD-9-CM: 781.2  5/5/2021 - Present Cervical strain ICD-10-CM: S16. Margy Mohs ICD-9-CM: 847.0  5/5/2021 - Present Goals of care, counseling/discussion ICD-10-CM: Z71.89 ICD-9-CM: V65.49  5/4/2021 - Present Mass of right lung ICD-10-CM: R91.8 ICD-9-CM: 786.6  5/3/2021 - Present Colonic mass ICD-10-CM: K63.89 ICD-9-CM: 569.89  5/3/2021 - Present Fall ICD-10-CM: W19. Aquiles Mckeon ICD-9-CM: E888.9  5/3/2021 - Present Right hip pain ICD-10-CM: M25.551 ICD-9-CM: 719.45  5/3/2021 - Present Debility ICD-10-CM: R53.81 ICD-9-CM: 799.3  5/2/2021 - Present Paroxysmal atrial fibrillation (HCC) ICD-10-CM: I48.0 ICD-9-CM: 427.31  9/23/2020 - Present Atrial fibrillation [facility]) ICD-10-CM: I48.91 
ICD-9-CM: 427.31  9/23/2020 - Present CHF (congestive heart failure) (HCC) ICD-10-CM: I50.9 ICD-9-CM: 428.0  9/22/2020 - Present GI bleed ICD-10-CM: K92.2 ICD-9-CM: 578.9  9/21/2020 - Present Symptomatic anemia ICD-10-CM: D64.9 ICD-9-CM: 285.9  9/21/2020 - Present History of transient ischemic attack (TIA) ICD-10-CM: J78.32 
ICD-9-CM: V12.54  5/15/2019 - Present Dysphasia ICD-10-CM: R47.02 
ICD-9-CM: 784.59  5/15/2019 - Present Hypokalemia ICD-10-CM: E87.6 ICD-9-CM: 276.8  5/15/2019 - Present Elevated serum creatinine ICD-10-CM: R79.89 ICD-9-CM: 790.99  5/15/2019 - Present Stage 3 chronic [facility] ([facility] ICD-10-CM: N18.30 ICD-9-CM: 585.3  5/15/2019 - Present Right facial numbness ICD-10-CM: R20.0 ICD-9-CM: 782.0  5/15/2019 - Present History of CVA (cerebrovascular accident) ICD-10-CM: Z86.73 
ICD-9-CM: V12.54  5/15/2019 - Present Orthostatic hypotension ICD-10-CM: I95.1 ICD-9-CM: 458.0  10/2/2018 - Present Acquired hypothyroidism ICD-10-CM: E03.9 ICD-9-CM: 244.9  9/28/2018 - Present Anemia ICD-10-CM: D64.9 ICD-9-CM: 285.9  9/28/2018 - Present Atopic dermatitis ICD-10-CM: L20.9 ICD-9-CM: 691.8  9/28/2018 - Present Callus ICD-10-CM: L84 
ICD-9-CM: 758  9/28/2018 - Present Dementia ([facility] ICD-10-CM: F03.90 ICD-9-CM: 294.20  9/28/2018 - Present Type II diabetes mellitus ([facility] ICD-10-CM: E11.9 ICD-9-CM: 250.00  9/28/2018 - Present Diabetic neuropathy with neurologic complication [facility]) GCA-04-EM: E11.40, E11.49 
ICD-9-CM: 250.60, 357.2  9/28/2018 - Present Essential hypertension ICD-10-CM: I10 
ICD-9-CM: 401.9  9/28/2018 - Present History of breast cancer ICD-10-CM: Z85.3 ICD-9-CM: V10.3  9/28/2018 - Present Hypercholesterolemia ICD-10-CM: E78.00 ICD-9-CM: 272.0  9/28/2018 - Present Lymphedema ICD-10-CM: I89.0 ICD-9-CM: 457.1  9/28/2018 - Present Memory loss ICD-10-CM: R41.3 ICD-9-CM: 780.93  9/28/2018 - Present Osteopenia ICD-10-CM: M85.80 ICD-9-CM: 733.90  9/28/2018 - Present RESOLVED: Headache ICD-10-CM: R51.9 ICD-9-CM: 784.0  5/15/2019 - 9/21/2020 RESOLVED: Abnormal urinalysis ICD-10-CM: R82.90 ICD-9-CM: 791.9  5/15/2019 - 9/21/2020 RESOLVED: UTI (urinary tract infection) ICD-10-CM: N39.0 ICD-9-CM: 599.0  5/15/2019 - 9/21/2020 Discharge/Transfer Medications Current Discharge Medication List  
  
START taking these medications Details  
lidocaine 4 % patch 1 Patch by TransDERmal route daily as needed for Pain. Apply to right shoulder 
Qty: 15 Patch, Refills: 0 CONTINUE these medications which have CHANGED Details  
acetaminophen (TYLENOL) 325 mg tablet Take 2 Tabs by mouth three (3) times daily for 30 days. Qty: 180 Tab, Refills: 0 CONTINUE these medications which have NOT CHANGED Details  
midodrine (PROAMATINE) 2.5 mg tablet Take 1 Tab by mouth daily for 30 days. Qty: 90 Tab, Refills: 1 Associated Diagnoses: Orthostatic hypotension docusate sodium (COLACE) 100 mg capsule Take 1 Cap by mouth two (2) times a day. Qty: 180 Cap, Refills: 1  
  
omeprazole (PRILOSEC) 20 mg capsule TAKE 1 CAPSULE BY MOUTH EVERY DAY Qty: 90 Cap, Refills: 1 Associated Diagnoses: Gastroesophageal reflux disease without esophagitis  
  
levothyroxine (SYNTHROID) 50 mcg tablet Take 1 tablet by mouth once daily 
Qty: 90 Tab, Refills: 1 Associated Diagnoses: Acquired hypothyroidism  
  
bumetanide (BUMEX) 1 mg tablet Take 1 Tab by mouth as needed (swelling or shortness of breath). Qty: 30 Tab, Refills: 2 Associated Diagnoses: Chronic systolic congestive heart failure (HCC)  
  
polyethylene glycol (MIRALAX) 17 gram packet Take 1 Packet by mouth daily. Qty: 30 Packet, Refills: 2 Associated Diagnoses: Constipation, unspecified constipation type STOP taking these medications  
  
 atorvastatin (LIPITOR) 40 mg tablet Comments:  
Reason for Stopping:   
   
 ferrous sulfate 325 mg (65 mg iron) tablet Comments:  
Reason for Stopping:   
   
  
 
  
Diet:  Per Hospice. Activity:  As tolerated Disposition: SNF for 20 days and then home hospice Discharge instructions to patient/family Please seek medical attention for any new or worsening symptoms particularly fever, chest pain, shortness of breath, abdominal pain, nausea, vomiting Follow up plans/appointments Follow-up Information Follow up With Specialties Details Why Contact Info Mayank Gregory MD Family Medicine In 2 days  [address] [facility] [address] [place] 
[phone] [facility] EMERGENCY DEPT Emergency Medicine  As needed, If symptoms worsen [address] [facility] RESIDENTIAL TREATMENT [address] 
[phone] [address] [address] 
[phone] Shahla Curiel DO Family Medicine Resident For Billing Chief Complaint Patient presents with  Leg Pain  
 
 
Hospital Problems  Date Reviewed: 1/18/2021 Codes Class Noted POA Gait abnormality ICD-10-CM: R26.9 ICD-9-CM: 781.2  5/5/2021 Unknown Cervical strain ICD-10-CM: S16. Dania Rivera ICD-9-CM: 847.0  5/5/2021 Unknown Goals of care, counseling/discussion ICD-10-CM: Z71.89 ICD-9-CM: V65.49  5/4/2021 Unknown Mass of right lung ICD-10-CM: R91.8 ICD-9-CM: 786.6  5/3/2021 Unknown Colonic mass ICD-10-CM: K63.89 ICD-9-CM: 569.89  5/3/2021 Unknown Fall ICD-10-CM: W19. Tia Preston ICD-9-CM: O475.6  5/3/2021 Unknown Right hip pain ICD-10-CM: M25.551 ICD-9-CM: 719.45  5/3/2021 Unknown Debility ICD-10-CM: R53.81 ICD-9-CM: 799.3  5/2/2021 Unknown

## 2021-05-06 VITALS
SYSTOLIC BLOOD PRESSURE: 168 MMHG | WEIGHT: 115.52 LBS | HEIGHT: 65 IN | TEMPERATURE: 98 F | BODY MASS INDEX: 19.25 KG/M2 | DIASTOLIC BLOOD PRESSURE: 71 MMHG | OXYGEN SATURATION: 100 % | RESPIRATION RATE: 16 BRPM | HEART RATE: 76 BPM

## 2021-05-06 DIAGNOSIS — D64.9 ANEMIA, UNSPECIFIED TYPE: ICD-10-CM

## 2021-05-06 DIAGNOSIS — I10 ESSENTIAL HYPERTENSION: ICD-10-CM

## 2021-05-06 DIAGNOSIS — I50.22 CHRONIC SYSTOLIC CONGESTIVE HEART FAILURE (HCC): ICD-10-CM

## 2021-05-06 DIAGNOSIS — Z86.73 HISTORY OF CVA (CEREBROVASCULAR ACCIDENT): ICD-10-CM

## 2021-05-06 DIAGNOSIS — R53.81 DEBILITY: ICD-10-CM

## 2021-05-06 DIAGNOSIS — K63.89 COLONIC MASS: ICD-10-CM

## 2021-05-06 DIAGNOSIS — N18.30 STAGE 3 CHRONIC KIDNEY DISEASE, UNSPECIFIED WHETHER STAGE 3A OR 3B CKD (HCC): ICD-10-CM

## 2021-05-06 DIAGNOSIS — I48.91 ATRIAL FIBRILLATION, UNSPECIFIED TYPE (HCC): ICD-10-CM

## 2021-05-06 DIAGNOSIS — E03.9 ACQUIRED HYPOTHYROIDISM: ICD-10-CM

## 2021-05-06 DIAGNOSIS — R91.8 MASS OF RIGHT LUNG: ICD-10-CM

## 2021-05-06 DIAGNOSIS — E11.9 TYPE 2 DIABETES MELLITUS WITHOUT COMPLICATION, WITHOUT LONG-TERM CURRENT USE OF INSULIN (HCC): ICD-10-CM

## 2021-05-06 DIAGNOSIS — F03.90 DEMENTIA WITHOUT BEHAVIORAL DISTURBANCE, UNSPECIFIED DEMENTIA TYPE: ICD-10-CM

## 2021-05-06 PROCEDURE — 94760 N-INVAS EAR/PLS OXIMETRY 1: CPT

## 2021-05-06 PROCEDURE — 99238 HOSP IP/OBS DSCHRG MGMT 30/<: CPT | Performed by: FAMILY MEDICINE

## 2021-05-06 PROCEDURE — 97530 THERAPEUTIC ACTIVITIES: CPT

## 2021-05-06 PROCEDURE — 97164 PT RE-EVAL EST PLAN CARE: CPT

## 2021-05-06 PROCEDURE — 74011250637 HC RX REV CODE- 250/637: Performed by: STUDENT IN AN ORGANIZED HEALTH CARE EDUCATION/TRAINING PROGRAM

## 2021-05-06 PROCEDURE — 97168 OT RE-EVAL EST PLAN CARE: CPT

## 2021-05-06 PROCEDURE — 97116 GAIT TRAINING THERAPY: CPT

## 2021-05-06 PROCEDURE — 97535 SELF CARE MNGMENT TRAINING: CPT

## 2021-05-06 PROCEDURE — 74011250637 HC RX REV CODE- 250/637: Performed by: INTERNAL MEDICINE

## 2021-05-06 RX ORDER — LIDOCAINE 4 G/100G
1 PATCH TOPICAL DAILY PRN
Status: DISCONTINUED | OUTPATIENT
Start: 2021-05-06 | End: 2021-05-06

## 2021-05-06 RX ORDER — LIDOCAINE 4 G/100G
1 PATCH TOPICAL DAILY PRN
Status: DISCONTINUED | OUTPATIENT
Start: 2021-05-06 | End: 2021-05-06 | Stop reason: HOSPADM

## 2021-05-06 RX ORDER — ACETAMINOPHEN 325 MG/1
650 TABLET ORAL 3 TIMES DAILY
Qty: 180 TAB | Refills: 0 | Status: SHIPPED
Start: 2021-05-06 | End: 2021-06-05

## 2021-05-06 RX ORDER — LIDOCAINE 4 G/100G
1 PATCH TOPICAL
Qty: 15 PATCH | Refills: 0 | Status: SHIPPED
Start: 2021-05-06

## 2021-05-06 RX ORDER — LIDOCAINE 4 G/100G
1 PATCH TOPICAL
Qty: 15 PATCH | Refills: 0 | Status: SHIPPED | OUTPATIENT
Start: 2021-05-06 | End: 2021-05-06

## 2021-05-06 RX ADMIN — LEVOTHYROXINE SODIUM 50 MCG: 0.05 TABLET ORAL at 13:40

## 2021-05-06 RX ADMIN — PANTOPRAZOLE SODIUM 40 MG: 40 TABLET, DELAYED RELEASE ORAL at 06:30

## 2021-05-06 RX ADMIN — FERROUS SULFATE TAB 325 MG (65 MG ELEMENTAL FE) 325 MG: 325 (65 FE) TAB at 13:41

## 2021-05-06 RX ADMIN — ACETAMINOPHEN 650 MG: 325 TABLET ORAL at 13:41

## 2021-05-06 RX ADMIN — MIDODRINE HYDROCHLORIDE 2.5 MG: 5 TABLET ORAL at 13:41

## 2021-05-06 RX ADMIN — DOCUSATE SODIUM 100 MG: 100 CAPSULE, LIQUID FILLED ORAL at 13:41

## 2021-05-06 NOTE — PROGRESS NOTES
Problem: Mobility Impaired (Adult and Pediatric) Goal: *Acute Goals and Plan of Care (Insert Text) Description: FUNCTIONAL STATUS PRIOR TO ADMISSION: Patient was modified independent using a walker vs cane for functional mobility however she reports multiple falls over the last month. HOME SUPPORT PRIOR TO ADMISSION: The patient lived alone but reports that she has caregivers assist during the day hours. Physical Therapy Goals Initiated 5/3/2021; was completed transitioned to hospice however hospice cancelled and re eval today continue same goals as of 5/6/2021 1. Patient will move from supine to sit and sit to supine  and scoot up and down in bed with independence within 7 day(s). 2.  Patient will transfer from bed to chair and chair to bed with supervision/set-up using the least restrictive device within 7 day(s). 3.  Patient will perform sit to stand with supervision/set-up within 7 day(s). 4.  Patient will ambulate with supervision/set-up for 80 feet with the least restrictive device within 7 day(s). 5.  Patient will ascend/descend 6 stairs with 1 handrail(s) with minimal assistance/contact guard assist within 7 day(s). Outcome: Progressing Towards Goal 
 PHYSICAL THERAPY REEVALUATION Patient: Santino Cope (66 y.o. female) Date: 5/6/2021 Primary Diagnosis: Debility [R53.81] Precautions: fall  Fall ASSESSMENT Based on the objective data described below, the patient presents with difficulty with ambulation and generalized weakness. Communicate with nurse and  cleared for therapy. Patient was eval 5/3/21 then transition to hospice so order was completed. But now patient off hospice. Patient already up on the chair when received. Sit to stand min assist x 2, ambulate with rolling walker in the room. Assisted to and from the bathroom min assist x 2. Slow pace gait no loss of balance.  Need verbal and tactile cues to advance rolling walker and to stay inside the rolling walker. Offered to ambulate more however patient declined and just want to go back to the chair feels tired. OOB to chair as tolerated performed some active range of motion exercise on both LE all planes. Educate and instructed patient to continue active range of motion exercise on both legs while up on chair or on bed multiple times. Recommend patient to be up on the chair at least 3 times  day every meal times as tolerated. Activated chair alarm notified nurse who agreed to monitor patient. Current Level of Function Impacting Discharge (mobility/balance): min assist x 2 with transfers and ambulation using a rolling walker. Functional Outcome Measure: The patient scored 55/100 on the  barthel index outcome measure. Other factors to consider for discharge: fall Patient will benefit from skilled therapy intervention to address the above noted impairments. PLAN : 
Recommendations and Planned Interventions: bed mobility training, transfer training, gait training, therapeutic exercises, neuromuscular re-education, orthotic/prosthetic training, patient and family training/education and therapeutic activities Frequency/Duration: Patient will be followed by physical therapy:  5 times a week to address goals. Recommendation for discharge: (in order for the patient to meet his/her long term goals) Therapy up to 5 days/week in SNF setting This discharge recommendation: 
Has been made in collaboration with the attending provider and/or case management Equipment recommendations for successful discharge (if) home: patient owns DME required for discharge SUBJECTIVE:  
Patient stated ok.  OBJECTIVE DATA SUMMARY:  
HISTORY:   
Past Medical History:  
Diagnosis Date  Abnormal urinalysis 5/15/2019  Anemia  [facility] cancer ([facility]  Cancer [facility])   
 left breast  
 Cataract  Chronic renal failure  Colonic polyp  Dementia ([facility]  Diabetes ([facility]  Eczema  Headache  Hyperlipemia  Hypothyroidism  Ill-defined condition   
 high cholesterol, hypothyroid  Osteopenia  Posterior vitreous detachment, left  Type 2 diabetes mellitus ([facility]  UTI (urinary tract infection)  Vitamin D deficiency Past Surgical History:  
Procedure Laterality Date  HX APPENDECTOMY  HX CATARACT REMOVAL    
 HX COLONOSCOPY  2009  HX GYN    
 hysterectomy [address] Left breast ,edial segmental mastectomy- 1992;  
 HX ORTHOPAEDIC    
 back  AR BREAST SURGERY PROCEDURE UNLISTED    
 left breast lumpectomy Hospital course since last seen and reason for reevaluation: off hospice cleared for therapy Personal factors and/or comorbidities impacting plan of care:  
 
Home Situation Home Environment: Private residence # Steps to Enter: 6 Rails to Enter: Yes Hand Rails : Right One/Two Story Residence: One story Living Alone: Yes Support Systems: Family member(s)(caregivers) Patient Expects to be Discharged to[de-identified] Private residence Current DME Used/Available at Home: Wheelchair, Yolis Led, rolling, Shower chair, Grab bars, [employer], straight Tub or Shower Type: Shower EXAMINATION/PRESENTATION/DECISION MAKING:  
Critical Behavior: 
Neurologic State: Alert Orientation Level: Oriented to person, Oriented to place Cognition: Follows commands, Decreased attention/concentration Safety/Judgement: Fall prevention, Decreased insight into deficits Hearing: Auditory Auditory Impairment: Hard of hearing, bilateral 
 
Range Of Motion: 
AROM: Generally decreased, functional 
  
  
  
PROM: Within functional limits Strength:   
Strength: Generally decreased, functional 
  
  
  
  
  
  
Tone & Sensation:  
Tone: Normal 
  
  
  
  
Sensation: Intact Coordination: 
Coordination: Generally decreased, functional 
Vision:  
Acuity: Within Defined Limits Functional Mobility: 
Bed Mobility: 
Rolling: (already up on the chair) Transfers: 
Sit to Stand: Minimum assistance;Assist x2 Stand to Sit: Minimum assistance;Assist x2 Stand Pivot Transfers: Minimum assistance;Assist x2 Bed to Chair: Minimum assistance;Assist x2 Balance:  
Sitting: Intact; High guard Standing: Impaired;Pull to stand; With support Standing - Static: Fair Standing - Dynamic : Fair Ambulation/Gait Training: 
Distance (ft): 20 Feet (ft) Assistive Device: Walker, rolling;Gait belt Ambulation - Level of Assistance: Minimal assistance;Assist x2 Gait Description (WDL): Exceptions to [facility] Gait Abnormalities: Path deviations; Step to gait Base of Support: Widened Speed/Marah: Slow;Pace decreased (<100 feet/min) Step Length: Right shortened;Left shortened Therapeutic Exercises:  
Educate and instructed patient to continue active range of motion exercise on both legs while up on chair or on bed multiple times. Recommend patient to be up on the chair at least 3 times  day every meal times as tolerated. Functional Measure: 
Barthel Index: 
 
Bathin Bladder: 10 Bowels: 10 
Groomin Dressin Feeding: 10 Mobility: 0 Stairs: 0 Toilet Use: 5 Transfer (Bed to Chair and Back): 10 Total: 55/100 The Barthel ADL Index: Guidelines 1. The index should be used as a record of what a patient does, not as a record of what a patient could do. 2. The main aim is to establish degree of independence from any help, physical or verbal, however minor and for whatever reason. 3. The need for supervision renders the patient not independent. 4. A patient's performance should be established using the best available evidence. Asking the patient, friends/relatives and nurses are the usual sources, but direct observation and common sense are also important. However direct testing is not needed.  
5. Usually the patient's performance over the preceding 24-48 hours is important, but occasionally longer periods will be relevant. 6. Middle categories imply that the patient supplies over 50 per cent of the effort. 7. Use of aids to be independent is allowed. Albertina Carter., Barthel, D.W. (1827). Functional evaluation: the Barthel Index. [address] [facility] (14)2. Leonette Castleman [place], J.J.M.F, Meme Louis.Lady., [place], [address] Measuring the change indisability after inpatient rehabilitation; comparison of the responsiveness of the Barthel Index and Functional [place] Measure. Journal of Neurology, Neurosurgery, and Psychiatry, 66(4), 572-875. Lorenzo Joaquin, N.J.A, BERNARDINO Real, & Huma Tenorio M.A. (2004.) Assessment of post-stroke quality of life in cost-effectiveness studies: The usefulness of the Barthel Index and the EuroQoL-5D. [facility], 13, 942-17 Pain Ratin/10 Activity Tolerance:  
Good After treatment patient left in no apparent distress:  
Sitting in chair, Heels elevated for pressure relief, Call bell within reach and Bed / chair alarm activated COMMUNICATION/EDUCATION:  
The patients plan of care was discussed with: Occupational therapist, Registered nurse and Case management. Fall prevention education was provided and the patient/caregiver indicated understanding. Thank you for this referral. 
Alyson Pena, PT,WCC.

## 2021-05-06 NOTE — PROGRESS NOTES
[address] [facility] PROGRAM 
PROGRESS NOTE  
 
5/6/2021 PCP: Naun Saldana MD  
 
Assessment/Plan:  
 
Britt Hernandez is a 80 y.o. female with a PMHx of  breast cancer, DM2, L arm lymphedema, dementia, OA, hypothyroidism, GERD, orthostatic hypotension, iron deficiency, constipation, HFrEF, HLD who is admitted for progressive debility and dispo and incidentally found to have ascending colon lesion and RUE lesion concerning for malignancy. Debility: Transitioned to [address] Progressive. likely 2/2 malignancy of the right lung and colon detected on imaging this admission (see below). Possibly 2/2 R hip trauma sustained during GLF a week ago. 
- PT/OT/CM consulted for dispo - Palliative consulted, transitioned to [address] - Case Management Consulted - current plan is for pt to go to SNF for 20 days and then home with hospice Right shoulder/hip pain: S/p trauma sustained during GLF 1 week ago. R shoulder and hip xrays with no acute abnormality. 
- Tylenol TID  
- Lidocaine patch daily prn Ascending colon mass: Found incidentally on CT pelvis. If neoplasm, it may be the source of the possible met in the lung. S/p Zosyn (5/3) - BCx NGTD 
- Palliative following, pt comfort care Anemia: POA Hgb 7.6 (BL 7-9). Chronic 2/2 anemia of chronic disease likely 2/2 malignant process. - Continue home iron 325 every day Constipation: Home colace 100 BID and miralax PRN 
- Continue home meds Dementia: Stable. No home meds GERD: At home on prilosec 20 every day - Substitute for protonix 40 per formulary DM2: Patient not on any home meds. - Comfort Measures only Hypothyroidism: Patient at home on levothyroxine 50 every day. Last TSH was 2.45 (wnl) on 9/21/20 
- Continue home levothyroxine 50 HLD: At home on lipitor 40 
- Hold home lipitor 40 Orthostatic hypotension: Recently started on midodrine 2.5 every day 
- Continue home midodrine 2.5 every day L arm lymphedema: Chronic. 2/2 breast cancer s/p resection >10 years ago. Hyperkalemia: 5.2 POA. Possibly secondary to GI bleed HFrEF: Last echo 2020 with EF 35-40%. Patient on bumex 1 PRN 
- Caution with fluids - Continue bumex 1 PRN 
 
FEN/GI - Regular diet. Activity - Ambulate as tolerated DVT prophylaxis - Comfort Measures only GI prophylaxis - Protonix Fall prophylaxis - Fall precautions ordered. Disposition - Plan to d/c to TBD. Consulting PT, OT and CM Code Status - DNR. [facility] Name and Contact - Vianney Mendoza,  [place] - [phone] Patient Maggie Romo will be discussed with Dr. Esther Drummond. Subjective: Pt was seen and examined at bedside. R hip pain improved, still complaining of R shoulder pain. Objective:  
 
Patient Vitals for the past 24 hrs: 
 Temp Pulse Resp BP SpO2  
21 0509 98.1 °F (36.7 °C) 72 17 (!) 153/65 99 % 21 2352 98.1 °F (36.7 °C) 64 17 (!) 162/69 100 % 21 2049 97.5 °F (36.4 °C) 73 17 (!) 170/59 99 % 21 1232 98.5 °F (36.9 °C) 67 17 (!) 113/53 99 % 21 0735 98.5 °F (36.9 °C) 64 16 (!) 129/55 96 % Temp (24hrs), Av.1 °F (36.7 °C), Min:97.5 °F (36.4 °C), Max:98.5 °F (36.9 °C) O2 Device: None (Room air) Date 21 - 21 7862 21 - 21 5878 Shift [id number] 24 Hour Total 3919-4211 7231-3717 24 Hour Total  
INTAKE  
P.O. 350 200 550     
  P. O. 350 200 550 Shift Total(mL/kg) 350(6.7) 200(3.8) 550(10.5) OUTPUT Urine(mL/kg/hr) 400(0.6)  400(0.3) Urine Voided 400  400 Urine Occurrence(s)  2 x 2 x Emesis/NG output Emesis Occurrence(s)  0 x 0 x Stool Stool Occurrence(s)  0 x 0 x Shift Total(mL/kg) 400(7.6)  400(7.6) NET -50 200 150 Weight (kg) 52.4 52.4 52.4 52.4 52.4 52.4 Physical Exam 
General: No acute distress. Alert. Cooperative. Head: Normocephalic. Atraumatic. Eyes:              Conjunctiva pink. Sclera white. Neck: Supple. Normal ROM. No stiffness. Respiratory: CTAB. No w/r/r/c.  
Cardiovascular: RRR. Normal S1,S2. No m/r/g. GI: + bowel sounds. Nontender. No rebound tenderness or guarding. Nondistended. Extremities: No LE edema. Distal pulses intact. LUE nonpitting edema present (baseline). Right shoulder TTP, no swelling or erythema Skin: Warm, dry. No rashes. Neuro: No focal deficits noted. Data Review: CBC: 
No results for input(s): WBC, HGB, HCT, PLT, HGBEXT, HCTEXT, PLTEXT, HGBEXT, HCTEXT, PLTEXT in the last 72 hours. Metabolic Panel: No results for input(s): NA, K, CL, CO2, BUN, CREA, GLU, CA, MG, PHOS, ALB, TBIL, TBILI, ALT, INR, INREXT, INREXT in the last 72 hours. No lab exists for component: SGOT Micro: 
Lab Results Component Value Date/Time Culture result: NO GROWTH 2 DAYS 05/03/2021 06:35 AM  
 Culture result: NO GROWTH 2 DAYS 05/15/2019 04:15 PM  
 
Imaging: Xr Hip Rt W Or Wo Pelv 2-3 Vws Result Date: 5/2/2021 EXAM: XR HIP RT W OR WO PELV 2-3 VWS INDICATION: fall with R hip pain. COMPARISON: None. FINDINGS: AP view of the pelvis and a frogleg lateral view of the right hip demonstrate no fracture, dislocation or other acute abnormality. Degenerative changes of the spine. No acute abnormality. Ct Head Wo Cont Result Date: 5/2/2021 EXAM: CT HEAD WO CONT INDICATION: fall COMPARISON: August 6. CONTRAST: None. TECHNIQUE: Unenhanced CT of the head was performed using 5 mm images. Brain and bone windows were generated. Coronal and sagittal reformats. CT dose reduction was achieved through use of a standardized protocol tailored for this examination and automatic exposure control for dose modulation. FINDINGS: The ventricles and sulci are enlarged. There are bilateral basal ganglia calcifications. There are vertebral artery calcifications There is no significant white matter disease.  There is no intracranial hemorrhage, extra-axial collection, or mass effect. The basilar cisterns are open. No CT evidence of acute infarct. The bone windows demonstrate no abnormalities. The visualized portions of the paranasal sinuses and mastoid air cells are clear. No acute findings. Ct Spine Cerv Wo Cont Result Date: 5/2/2021 EXAM:  CT CERVICAL SPINE WITHOUT CONTRAST INDICATION: fall w neck pain. COMPARISON: August 6, 2020 CONTRAST:  None. TECHNIQUE: Multislice helical CT of the cervical spine was performed without intravenous contrast administration. Sagittal and coronal reformats were generated. CT dose reduction was achieved through use of a standardized protocol tailored for this examination and automatic exposure control for dose modulation. FINDINGS: The alignment is within normal limits. There is no fracture or compression deformity. The odontoid process is intact. The craniocervical junction is within normal limits. Calcification of the atlantoaxial ligament. Spiculated 2.4 cm mass in the right upper lobe. The incidentally imaged soft tissues are within normal limits. C2-C3: Bilateral facet arthropathy. Disc space narrowing. Significant right neural foraminal narrowing. C3-C4: There is no spinal canal or neural foraminal stenosis. C4-C5: Bilateral facet arthropathy. C5-C6: Disc space narrowing. Uncinate process hypertrophy with left neural foraminal narrowing. C6-C7: There is no spinal canal or neural foraminal stenosis. C7-T1: There is no spinal canal or neural foraminal stenosis. 1. Multilevel degenerative changes without fracture. 2. Right upper lobe mass. CT of the thorax recommended. Ct Pelv Wo Cont Result Date: 5/2/2021 *PRELIMINARY REPORT* CT of the pelvis demonstrates no fracture. Preliminary report was provided by Dr. Durene Gilford, the on-call radiologist, at 4:46 PM Final report to follow.  *END PRELIMINARY REPORT* *FINAL REPORT BELO* EXAM: CT PELV WO CONT INDICATION: Right hip pain after fall, difficulty walking. COMPARISON: CT pelvis on 4/23/4136 TECHNIQUE: Helical CT of the bony pelvis with coronal and sagittal reformats. Images reviewed in soft tissue and bone windows. CT dose reduction was achieved through the use of a standardized protocol tailored for this examination and automatic exposure control for dose modulation. CONTRAST: None. FINDINGS: Bones: Bones are osteopenic. No acute fracture. No aggressive bone lesion. Joint fluid: None. Articulations: Mild bilateral hip osteoarthritis. Lumbar spine degenerative disease is unchanged. Tendons: Bilateral hamstring origin calcific tendinosis. Muscles: No intramuscular hematoma. No focal atrophy. Soft tissue mass: None. Mural thickening of the proximal ascending colon is partially imaged. Edema is in the fat posterior to the ascending colon. 1. No fracture. 2. Ascending colitis versus neoplasm is partially imaged. Correlate with CBC. Recommend CT abdomen/pelvis with oral and IV contrast when patient can tolerate. Medications reviewed Current Facility-Administered Medications Medication Dose Route Frequency  lidocaine 4 % patch 1 Patch  1 Patch TransDERmal DAILY PRN  
 lidocaine 4 % patch 1 Patch  1 Patch TransDERmal DAILY PRN  
 acetaminophen (TYLENOL) tablet 650 mg  650 mg Oral TID  promethazine (PHENERGAN) tablet 12.5 mg  12.5 mg Oral Q6H PRN Or  
 ondansetron (ZOFRAN) injection 4 mg  4 mg IntraVENous Q6H PRN  
 bumetanide (BUMEX) tablet 1 mg  1 mg Oral PRN  
 docusate sodium (COLACE) capsule 100 mg  100 mg Oral BID  ferrous sulfate tablet 325 mg  325 mg Oral BID  levothyroxine (SYNTHROID) tablet 50 mcg  50 mcg Oral DAILY  midodrine (PROAMATINE) tablet 2.5 mg  2.5 mg Oral DAILY  pantoprazole (PROTONIX) tablet 40 mg  40 mg Oral ACB  polyethylene glycol (MIRALAX) packet 17 g  17 g Oral BID PRN Signed: 
 Fausto King DO Resident, Family Medicine Attending note: Attending note to follow. ..

## 2021-05-06 NOTE — PROGRESS NOTES
Physician Progress Note Javier Walker 
CSN #:                  [id number] :                       [id number] ADMIT DATE:       2021 11:49 AM 
DISCH DATE: 
RESPONDING 
PROVIDER #:        Naila MCDUFFIE MD 
 
 
 
QUERY TEXT: 
 
Stage of Chronic Kidney Disease: Please provide further specificity, if known. Clinical indicators include: chronic renal failure, bun, creatinine, bun/creatinine, gfr 
Options provided: 
-- Chronic kidney disease stage 1 
-- Chronic kidney disease stage 2 
-- Chronic kidney disease stage 3 
-- Chronic kidney disease stage 3a 
-- Chronic kidney disease stage 3b 
-- Chronic kidney disease stage 4 
-- Chronic kidney disease stage 5 
-- Chronic kidney disease stage 5, requiring dialysis -- End stage renal disease 
-- Other - I will add my own diagnosis -- Disagree - Not applicable / Not valid -- Disagree - Clinically Unable to determine / Unknown PROVIDER RESPONSE TEXT: 
 
The patient has chronic kidney disease stage 3b.  
 
 
Electronically signed by:  Naila Bradshaw MD 2021 10:11 PM

## 2021-05-06 NOTE — PROGRESS NOTES
Spiritual Care Assessment/Progress Note [address] [place] [address] Dr 
 
 
NAME: Maggie Romo      MRN: [id number] AGE: 80 y.o. SEX: female Methodist Affiliation: [facility]  
Language: [place] 5/6/2021     Total Time (in minutes): 16 Spiritual Assessment begun in [facility]  MED SURG 2 through conversation with: 
  
    [x]Patient        [] Family    [] Friend(s) Reason for Consult: Request by staff Spiritual beliefs: (Please include comment if needed) [x] Identifies with a janice tradition: Erica Calhoun    
   [] Supported by a janice community:        
   [] Claims no spiritual orientation:       
   [] Seeking spiritual identity:            
   [] Adheres to an individual form of spirituality:       
   [] Not able to assess:                   
 
    
Identified resources for coping:  
   [x] Prayer                           
   [] Music                  [] Guided Imagery [x] Family/friends                 [] Pet visits [] Devotional reading                         [] Unknown 
   [] Other:                                         
 
 
Interventions offered during this visit: (See comments for more details) Patient Interventions: Catharsis/review of pertinent events in supportive environment, Iconic (affirming the presence of God/Higher Power), Initial/Spiritual assessment, patient floor, Prayer (actual), Normalization of emotional/spiritual concerns, Affirmation of emotions/emotional suffering, Coping skills reviewed/reinforced, End of life issues discussed, Methodist beliefs/image of God discussed Plan of Care: 
 
 [] Support spiritual and/or cultural needs  
 [] Support AMD and/or advance care planning process    
 [] Support grieving process 
 [] Coordinate Rites and/or Rituals  
 [] Coordination with community clergy  [] No spiritual needs identified at this time 
 [] Detailed Plan of Care below (See Comments)  [] Make referral to Music Therapy 
[] Make referral to Pet Therapy [] Make referral to Addiction services 
[] Make referral to [facility] 
[] Make referral to Spiritual Care Partner 
[] No future visits requested       
[x] Follow up upon further referrals Comments:  's visit was in 5 Med Surg unit, in response to staff request, indicating pt requested to see a . Miss Raúl Dominguez welcomed  warmly and engaged in conversation and story telling. 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 Yolanda is her major coping source.  provided active listening in a supportive environment, affirmed thoughts and emotions and facilitated spiritual reflection. Pt's requested prayer was  offrerd at the end of the visit, for which she expressed appreciation Visited by: Yann Madrigal.  Paging Service: [phone])

## 2021-05-06 NOTE — PROGRESS NOTES
5/6/2021  CARE MANAGEMENT NOTE:   Pt was accepted to the [address] of [facility] and a skilled is available there today. RN, please call report to [facility] at [phone]. Pt will go to room 146. [facility] has been arranged for 4:15 p.m. Transition of Care Plan to SNF/Rehab 
 
SNF/Rehab Transition: 
Patient has been accepted to [facility] and meets criteria for admission. Patient will transported by [facility] and expected to leave at 4:15 p.m. Communication to Patient/Family: Met with patient and god-dtr (identified care giver) are agreeable to the transition plan. Communication to SNF/Rehab: 
Bedside RN, Gwen Thompson, has been notified to update the transition plan to the facility and call report (phone number [phone]). Discharge information has been updated on the AVS. Discharge instructions to be fax'd to facility at [facility] # [phone]). Nursing Please include all hard scripts for controlled substances, med rec and dc summary, and AVS in packet. Reviewed and confirmed with [facility], can manage the patient care needs for the following: SNF/Rehab Transition: 
Patient to follow-up with Home Health: [facility], other ,none) PCP/Specialist: Dr. Rand Eckert Reviewed and confirmed with [facility], [facility] they can manage the patient care needs for the following:  
 
Criss Valle with (X) only those applicable: 
 
Medication: 
[]  Medications will be available at the facility []  IV Antibiotics []  Controlled Substance - hard copy to be sent with patient  
[]  Weekly Labs Documents: 
[] Hard RX [x] MAR 
[] Kardex [x] AVS 
[x]Transfer Summary []Discharge Equipment: 
[]  CPAP/BiPAP []  Wound Vacuum 
[]  [place] or Urinary Device 
[]  PICC/Central Line 
[]  Nebulizer 
[]  Ventilator Treatment: 
[]Isolation (for MRSA, VRE, etc.) []Surgical Drain Management []Tracheostomy Care 
[]Dressing Changes []Dialysis with transportation and chair time N/A 
[]PEG Care []Oxygen []Daily Weights for Heart Failure Dietary: 
[]Any diet limitations []Tube Feedings []Total Parenteral Management (TPN) Eligible for Medicaid Long Term Services and Supports Yes: 
[] Eligible for medical assistance or will become eligible within 180 days and UAI completed. [] Provider/Patient and/or support system has requested screening. [] UAI copy provided to patient or responsible party, N/A 
[] UAI unavailable at discharge will send once processed to SNF provider. [] UAI unavailable at discharged mailed to patient No:  
[] Private pay and is not financially eligible for Medicaid within the next 180 days. [] Reside out-of-state. [] A residents of a state owned/operated facility that is licensed 
by [address] [facility] or [facility] 
[] Enrollment in [facility] hospice services 
[]  Medical [address] 
[] Patient /Family declines to have screening completed or provide financial information for screening Financial Resources: 
Medicaid   
[] Initiated and application pending  
[] Full coverage Advanced Care Plan: 
[]Surrogate Decision Maker of Care 
[]POA []Communicated Code Status DNR (DDNR\", \"Full\") Other Financial Resources: 
Medicaid   
[] Initiated and application pending  
[] Full coverage Advanced Care Plan: 
[]Surrogate Decision Maker of Care 
[]POA []Communicated Code Status DNR (DDNR\", \"Full\") Other COVID 19 test for placement was negative on 5/3 D. Bro Saldana

## 2021-05-06 NOTE — PROGRESS NOTES
5/6/2021   CARE MANAGEMENT NOTE:  CM reviewed EMR for clinical updates.  Pt was admitted with debility; also DM, colon mass, and dementia. Reportedly, pt resides alone. Niece Lyn Kussmaul ([phone]) [facility], Norman Costa ([phone]) is another primary decision maker. Michellenatalie Willingham ([phone]) is also a contact. 
  
RUR 13%; LOS 3 days 
  
Transition Plan of Care: 1. CM received call from DR RENNY SOLIS [facility]) this a.m. She met with pt and brina-dtgogo Costa in pt's room last evening. Revised plan is now for SNF level of rehab. 2.  Following completion of SNF rehab (20 days) pt will return to her own home with hired caregivers and probable admission to [facility] at that time 3. [facility] will likely be the mode of transport upon discharge. 
  
CM will continue to follow pt for SNF rehab. Yves

## 2021-05-06 NOTE — PROGRESS NOTES
Bedside shift change report given to Chris Swanson RN (oncoming nurse) by BREONNA STAFFORD (offgoing nurse). Report included the following information SBAR, Kardex, Intake/Output, MAR, Accordion and Recent Results.

## 2021-05-06 NOTE — PROGRESS NOTES
Nutrition Note 5/6: Follow up. Noted that pt is now comfort care with plans to d/c with hospice. No aggressive nutrition interventions appropriate at this time. Please consult if needed. Electronically signed by Case Pedersen RDN on 5/6/2021 at 8:15 AM 
 
Contact: [phone]

## 2021-05-07 ENCOUNTER — PATIENT OUTREACH (OUTPATIENT)
Dept: CASE MANAGEMENT | Age: 86
End: 2021-05-07

## 2021-05-08 LAB
BACTERIA SPEC CULT: NORMAL
SERVICE CMNT-IMP: NORMAL

## 2021-05-21 ENCOUNTER — PATIENT OUTREACH (OUTPATIENT)
Dept: CASE MANAGEMENT | Age: 86
End: 2021-05-21

## 2021-06-04 ENCOUNTER — PATIENT OUTREACH (OUTPATIENT)
Dept: CASE MANAGEMENT | Age: 86
End: 2021-06-04

## 2022-04-21 NOTE — PROGRESS NOTES
Identified pt with two pt identifiers(name and ). Reviewed record in preparation for visit and have obtained necessary documentation. Chief Complaint Patient presents with  
 Other  
  swelling on hands and feet Health Maintenance Due Topic  Foot Exam Q1   
 Eye Exam Retinal or Dilated  Bone Densitometry (Dexa) Screening  MICROALBUMIN Q1   
 Shingrix Vaccine Age 50> (2 of 2)  Medicare Yearly Exam   
 Lipid Screen  Flu Vaccine (1) Visit Vitals Blood Pressure (Abnormal) 160/69 (BP 1 Location: Left arm, BP Patient Position: Sitting) Pulse 69 Temperature 98.1 °F (36.7 °C) (Oral) Respiration 16 Height 5' 5\" (1.651 m) Weight 138 lb (62.6 kg) Oxygen Saturation 99% Body Mass Index 22.96 kg/m² Coordination of Care Questionnaire: 
:  
1) Have you been to an emergency room, urgent care, or hospitalized since your last visit? NO 
 
 
2. Have seen or consulted any other health care provider since your last visit? NO Patient is accompanied by  I have received verbal consent from Kierra Becerra to discuss any/all medical information while they are present in the room. Urine culture was positive.  Please prescribe Bactrim DS bid for 7 days

## 2023-01-23 NOTE — PROGRESS NOTES
Henna Haas is a 80 y.o. female      Chief Complaint   Patient presents with    Medication Evaluation     Amloipine / Midodrine          1. Have you been to the ER, urgent care clinic since your last visit? Hospitalized since your last visit? no      2. Have you seen or consulted any other health care providers outside of the [address] [place] [address] since your last visit? Include any pap smears or colon screening.   No Yes

## 2023-09-18 NOTE — ED NOTES
Pt was discharged and given instructions by Dr Glen Hammer Pt and neiceverbalized good understanding of all discharge instructions, and F/U care. All questions answered. Pt in stable condition on discharge. Resulted

## 2024-05-17 NOTE — TELEPHONE ENCOUNTER
Pt is confused about her medication and would like to speak to [facility]. She thinks her atorvastatin is a new medication she has never taken before for her thyroid and only wants to discuss with [facility]. VTE Assessment already completed for this visit

## 2024-12-12 NOTE — TELEPHONE ENCOUNTER
Called patient and informed her that we currently don't have a schedule for Dr. Ana Coughlin in January and asked if she would be willing to see another doctor or if she would like to wait to see him. She stated that she would like to wait to see Dr. Ana Coughlin. I informed her to call back on Monday and hopefully we will have his schedule by then and she stated she will do that.
Xiomara Harris Ms.  Joyce Valadez is requesting an appt with Dr. Prieto Richardson for BP & diabetic f/u  Her contact P [phone]  Thx,  cbg
alone